# Patient Record
Sex: FEMALE | Race: WHITE | NOT HISPANIC OR LATINO | Employment: OTHER | URBAN - METROPOLITAN AREA
[De-identification: names, ages, dates, MRNs, and addresses within clinical notes are randomized per-mention and may not be internally consistent; named-entity substitution may affect disease eponyms.]

---

## 2018-06-13 ENCOUNTER — OFFICE VISIT CONVERTED (OUTPATIENT)
Dept: CARDIOLOGY | Facility: CLINIC | Age: 78
End: 2018-06-13
Attending: INTERNAL MEDICINE

## 2018-12-06 ENCOUNTER — OFFICE VISIT CONVERTED (OUTPATIENT)
Dept: CARDIOLOGY | Facility: CLINIC | Age: 78
End: 2018-12-06
Attending: INTERNAL MEDICINE

## 2018-12-06 ENCOUNTER — CONVERSION ENCOUNTER (OUTPATIENT)
Dept: CARDIOLOGY | Facility: CLINIC | Age: 78
End: 2018-12-06

## 2019-01-07 ENCOUNTER — OFFICE VISIT CONVERTED (OUTPATIENT)
Dept: PODIATRY | Facility: CLINIC | Age: 79
End: 2019-01-07
Attending: PODIATRIST

## 2019-01-07 ENCOUNTER — CONVERSION ENCOUNTER (OUTPATIENT)
Dept: PODIATRY | Facility: CLINIC | Age: 79
End: 2019-01-07

## 2019-02-12 ENCOUNTER — HOSPITAL ENCOUNTER (OUTPATIENT)
Dept: LAB | Facility: HOSPITAL | Age: 79
Discharge: HOME | End: 2019-02-12
Attending: INTERNAL MEDICINE

## 2019-02-12 LAB
25(OH)D3 SERPL-MCNC: 36.8 NG/ML (ref 30–100)
ALBUMIN SERPL-MCNC: 4.1 G/DL (ref 3.5–5)
ANION GAP SERPL CALC-SCNC: 18 MMOL/L (ref 8–19)
ANION GAP SERPL CALC-SCNC: 20 MMOL/L (ref 8–19)
APPEARANCE UR: CLEAR
BASOPHILS # BLD AUTO: 0.06 10*3/UL (ref 0–0.2)
BASOPHILS NFR BLD AUTO: 0.85 % (ref 0–3)
BILIRUB UR QL: NEGATIVE
BUN SERPL-MCNC: 47 MG/DL (ref 5–25)
BUN SERPL-MCNC: 47 MG/DL (ref 5–25)
BUN/CREAT SERPL: 20 {RATIO} (ref 6–20)
BUN/CREAT SERPL: 21 {RATIO} (ref 6–20)
CALCIUM SERPL-MCNC: 8.8 MG/DL (ref 8.7–10.4)
CALCIUM SERPL-MCNC: 8.8 MG/DL (ref 8.7–10.4)
CHLORIDE SERPL-SCNC: 102 MMOL/L (ref 99–111)
CHLORIDE SERPL-SCNC: 103 MMOL/L (ref 99–111)
COLOR UR: YELLOW
CONV BACTERIA: ABNORMAL
CONV CO2: 22 MMOL/L (ref 22–32)
CONV CO2: 22 MMOL/L (ref 22–32)
CONV COLLECTION SOURCE (UA): ABNORMAL
CONV CREATININE URINE, RANDOM: 99 MG/DL (ref 10–300)
CONV HYALINE CASTS IN URINE MICRO: ABNORMAL /[LPF]
CONV UROBILINOGEN IN URINE BY AUTOMATED TEST STRIP: 0.2 {EHRLICHU}/DL (ref 0.1–1)
CREAT UR-MCNC: 2.28 MG/DL (ref 0.5–0.9)
CREAT UR-MCNC: 2.36 MG/DL (ref 0.5–0.9)
EOSINOPHIL # BLD AUTO: 0.35 10*3/UL (ref 0–0.7)
EOSINOPHIL # BLD AUTO: 4.65 % (ref 0–7)
ERYTHROCYTE [DISTWIDTH] IN BLOOD BY AUTOMATED COUNT: 12.6 % (ref 11.5–14.5)
GFR SERPLBLD BASED ON 1.73 SQ M-ARVRAT: 19 ML/MIN/{1.73_M2}
GFR SERPLBLD BASED ON 1.73 SQ M-ARVRAT: 20 ML/MIN/{1.73_M2}
GLUCOSE SERPL-MCNC: 197 MG/DL (ref 65–99)
GLUCOSE SERPL-MCNC: 203 MG/DL (ref 65–99)
GLUCOSE UR QL: NEGATIVE MG/DL
HBA1C MFR BLD: 13.6 G/DL (ref 12–16)
HCT VFR BLD AUTO: 39.3 % (ref 37–47)
HGB UR QL STRIP: NEGATIVE
KETONES UR QL STRIP: NEGATIVE MG/DL
LEUKOCYTE ESTERASE UR QL STRIP: ABNORMAL
LYMPHOCYTES # BLD AUTO: 2.67 10*3/UL (ref 1–5)
MCH RBC QN AUTO: 31.8 PG (ref 27–31)
MCHC RBC AUTO-ENTMCNC: 34.6 G/DL (ref 33–37)
MCV RBC AUTO: 91.8 FL (ref 81–99)
MONOCYTES # BLD AUTO: 0.56 10*3/UL (ref 0.2–1.2)
MONOCYTES NFR BLD AUTO: 7.44 % (ref 3–10)
NEUTROPHILS # BLD AUTO: 3.91 10*3/UL (ref 2–8)
NEUTROPHILS NFR BLD AUTO: 51.7 % (ref 30–85)
NITRITE UR QL STRIP: POSITIVE
NRBC BLD AUTO-RTO: 0 % (ref 0–0.01)
OSMOLALITY SERPL CALC.SUM OF ELEC: 304 MOSM/KG (ref 273–304)
PH UR STRIP.AUTO: 5 [PH] (ref 5–8)
PHOSPHATE SERPL-MCNC: 4 MG/DL (ref 2.4–4.5)
PLATELET # BLD AUTO: 199 10*3/UL (ref 130–400)
PMV BLD AUTO: 7.5 FL (ref 7.4–10.4)
POTASSIUM SERPL-SCNC: 4.6 MMOL/L (ref 3.5–5.3)
POTASSIUM SERPL-SCNC: 4.6 MMOL/L (ref 3.5–5.3)
PROT UR QL: NEGATIVE MG/DL
PROT UR-MCNC: 10.5 MG/DL
PTH-INTACT SERPL-MCNC: 181.3 PG/ML (ref 11.1–79.5)
RBC # BLD AUTO: 4.28 10*6/UL (ref 4.2–5.4)
RBC #/AREA URNS HPF: ABNORMAL /[HPF]
SODIUM SERPL-SCNC: 138 MMOL/L (ref 135–147)
SODIUM SERPL-SCNC: 139 MMOL/L (ref 135–147)
SP GR UR: 1.02 (ref 1–1.03)
VARIANT LYMPHS NFR BLD MANUAL: 35.4 % (ref 20–45)
WBC # BLD AUTO: 7.56 10*3/UL (ref 4.8–10.8)
WBC #/AREA URNS HPF: ABNORMAL /[HPF]

## 2019-03-11 ENCOUNTER — HOSPITAL ENCOUNTER (OUTPATIENT)
Dept: OTHER | Facility: HOSPITAL | Age: 79
Discharge: HOME | End: 2019-03-11
Attending: INTERNAL MEDICINE

## 2019-03-11 LAB
ANION GAP SERPL CALC-SCNC: 16 MMOL/L (ref 8–19)
BUN SERPL-MCNC: 48 MG/DL (ref 5–25)
BUN/CREAT SERPL: 25 {RATIO} (ref 6–20)
CALCIUM SERPL-MCNC: 9.3 MG/DL (ref 8.7–10.4)
CHLORIDE SERPL-SCNC: 109 MMOL/L (ref 99–111)
CHOLEST SERPL-MCNC: 155 MG/DL (ref 107–200)
CHOLEST/HDLC SERPL: 4.6 {RATIO} (ref 3–6)
CONV CO2: 23 MMOL/L (ref 22–32)
CREAT UR-MCNC: 1.91 MG/DL (ref 0.5–0.9)
GFR SERPLBLD BASED ON 1.73 SQ M-ARVRAT: 25 ML/MIN/{1.73_M2}
GLUCOSE SERPL-MCNC: 118 MG/DL (ref 65–99)
HDLC SERPL-MCNC: 34 MG/DL (ref 40–60)
LDLC SERPL CALC-MCNC: 76 MG/DL (ref 70–100)
OSMOLALITY SERPL CALC.SUM OF ELEC: 310 MOSM/KG (ref 273–304)
POTASSIUM SERPL-SCNC: 4.7 MMOL/L (ref 3.5–5.3)
SODIUM SERPL-SCNC: 143 MMOL/L (ref 135–147)
TRIGL SERPL-MCNC: 225 MG/DL (ref 40–150)
VLDLC SERPL-MCNC: 45 MG/DL (ref 5–37)

## 2019-05-21 ENCOUNTER — HOSPITAL ENCOUNTER (OUTPATIENT)
Dept: LAB | Facility: HOSPITAL | Age: 79
Discharge: HOME | End: 2019-05-21
Attending: INTERNAL MEDICINE

## 2019-05-21 LAB
ALBUMIN SERPL-MCNC: 4 G/DL (ref 3.5–5)
ANION GAP SERPL CALC-SCNC: 20 MMOL/L (ref 8–19)
APPEARANCE UR: CLEAR
BASOPHILS # BLD AUTO: 0.09 10*3/UL (ref 0–0.2)
BASOPHILS NFR BLD AUTO: 1.1 % (ref 0–3)
BILIRUB UR QL: NEGATIVE
BUN SERPL-MCNC: 59 MG/DL (ref 5–25)
BUN/CREAT SERPL: 25 {RATIO} (ref 6–20)
CALCIUM SERPL-MCNC: 8.9 MG/DL (ref 8.7–10.4)
CHLORIDE SERPL-SCNC: 105 MMOL/L (ref 99–111)
COLOR UR: YELLOW
CONV ABS IMM GRAN: 0.03 10*3/UL (ref 0–0.2)
CONV BACTERIA: ABNORMAL
CONV CO2: 22 MMOL/L (ref 22–32)
CONV COLLECTION SOURCE (UA): ABNORMAL
CONV CREATININE URINE, RANDOM: 184.8 MG/DL (ref 10–300)
CONV HYALINE CASTS IN URINE MICRO: ABNORMAL /[LPF]
CONV IMMATURE GRAN: 0.4 % (ref 0–1.8)
CONV PROTEIN TO CREATININE RATIO (RANDOM URINE): 0.13 {RATIO} (ref 0–0.1)
CONV UROBILINOGEN IN URINE BY AUTOMATED TEST STRIP: 0.2 {EHRLICHU}/DL (ref 0.1–1)
CREAT UR-MCNC: 2.37 MG/DL (ref 0.5–0.9)
DEPRECATED RDW RBC AUTO: 47.8 FL (ref 36.4–46.3)
EOSINOPHIL # BLD AUTO: 0.5 10*3/UL (ref 0–0.7)
EOSINOPHIL # BLD AUTO: 5.9 % (ref 0–7)
ERYTHROCYTE [DISTWIDTH] IN BLOOD BY AUTOMATED COUNT: 13.4 % (ref 11.7–14.4)
GFR SERPLBLD BASED ON 1.73 SQ M-ARVRAT: 19 ML/MIN/{1.73_M2}
GLUCOSE SERPL-MCNC: 137 MG/DL (ref 65–99)
GLUCOSE UR QL: NEGATIVE MG/DL
HBA1C MFR BLD: 12.2 G/DL (ref 12–16)
HCT VFR BLD AUTO: 37.7 % (ref 37–47)
HGB UR QL STRIP: NEGATIVE
KETONES UR QL STRIP: NEGATIVE MG/DL
LEUKOCYTE ESTERASE UR QL STRIP: ABNORMAL
LYMPHOCYTES # BLD AUTO: 2.81 10*3/UL (ref 1–5)
MCH RBC QN AUTO: 31.1 PG (ref 27–31)
MCHC RBC AUTO-ENTMCNC: 32.4 G/DL (ref 33–37)
MCV RBC AUTO: 96.2 FL (ref 81–99)
MONOCYTES # BLD AUTO: 0.77 10*3/UL (ref 0.2–1.2)
MONOCYTES NFR BLD AUTO: 9.1 % (ref 3–10)
NEUTROPHILS # BLD AUTO: 4.3 10*3/UL (ref 2–8)
NEUTROPHILS NFR BLD AUTO: 50.4 % (ref 30–85)
NITRITE UR QL STRIP: NEGATIVE
NRBC CBCN: 0 % (ref 0–0.7)
PH UR STRIP.AUTO: 5 [PH] (ref 5–8)
PHOSPHATE SERPL-MCNC: 4.4 MG/DL (ref 2.4–4.5)
PLATELET # BLD AUTO: 198 10*3/UL (ref 130–400)
PMV BLD AUTO: 10.6 FL (ref 9.4–12.3)
POTASSIUM SERPL-SCNC: 4.9 MMOL/L (ref 3.5–5.3)
PROT UR QL: NEGATIVE MG/DL
PROT UR-MCNC: 24.5 MG/DL
PTH-INTACT SERPL-MCNC: 163.5 PG/ML (ref 11.1–79.5)
RBC # BLD AUTO: 3.92 10*6/UL (ref 4.2–5.4)
RBC #/AREA URNS HPF: ABNORMAL /[HPF]
SODIUM SERPL-SCNC: 142 MMOL/L (ref 135–147)
SP GR UR: 1.02 (ref 1–1.03)
VARIANT LYMPHS NFR BLD MANUAL: 33.1 % (ref 20–45)
WBC # BLD AUTO: 8.5 10*3/UL (ref 4.8–10.8)
WBC #/AREA URNS HPF: ABNORMAL /[HPF]

## 2019-06-07 ENCOUNTER — HOSPITAL ENCOUNTER (OUTPATIENT)
Dept: OTHER | Facility: HOSPITAL | Age: 79
Discharge: HOME | End: 2019-06-07
Attending: INTERNAL MEDICINE

## 2019-06-07 LAB
ALBUMIN SERPL-MCNC: 3.9 G/DL (ref 3.5–5)
ALBUMIN/GLOB SERPL: 1.1 {RATIO} (ref 1.4–2.6)
ALP SERPL-CCNC: 53 U/L (ref 43–160)
ALT SERPL-CCNC: 13 U/L (ref 10–40)
ANION GAP SERPL CALC-SCNC: 19 MMOL/L (ref 8–19)
AST SERPL-CCNC: 16 U/L (ref 15–50)
BILIRUB SERPL-MCNC: 0.32 MG/DL (ref 0.2–1.3)
BUN SERPL-MCNC: 51 MG/DL (ref 5–25)
BUN/CREAT SERPL: 22 {RATIO} (ref 6–20)
CALCIUM SERPL-MCNC: 8.8 MG/DL (ref 8.7–10.4)
CHLORIDE SERPL-SCNC: 107 MMOL/L (ref 99–111)
CHOLEST SERPL-MCNC: 134 MG/DL (ref 107–200)
CHOLEST/HDLC SERPL: 4.1 {RATIO} (ref 3–6)
CONV CO2: 23 MMOL/L (ref 22–32)
CONV TOTAL PROTEIN: 7.3 G/DL (ref 6.3–8.2)
CREAT UR-MCNC: 2.28 MG/DL (ref 0.5–0.9)
GFR SERPLBLD BASED ON 1.73 SQ M-ARVRAT: 20 ML/MIN/{1.73_M2}
GLOBULIN UR ELPH-MCNC: 3.4 G/DL (ref 2–3.5)
GLUCOSE SERPL-MCNC: 111 MG/DL (ref 65–99)
HDLC SERPL-MCNC: 33 MG/DL (ref 40–60)
LDLC SERPL CALC-MCNC: 51 MG/DL (ref 70–100)
OSMOLALITY SERPL CALC.SUM OF ELEC: 312 MOSM/KG (ref 273–304)
POTASSIUM SERPL-SCNC: 4.9 MMOL/L (ref 3.5–5.3)
SODIUM SERPL-SCNC: 144 MMOL/L (ref 135–147)
TRIGL SERPL-MCNC: 250 MG/DL (ref 40–150)
VLDLC SERPL-MCNC: 50 MG/DL (ref 5–37)

## 2019-06-12 ENCOUNTER — OFFICE VISIT CONVERTED (OUTPATIENT)
Dept: CARDIOLOGY | Facility: CLINIC | Age: 79
End: 2019-06-12
Attending: INTERNAL MEDICINE

## 2019-06-12 ENCOUNTER — CONVERSION ENCOUNTER (OUTPATIENT)
Dept: CARDIOLOGY | Facility: CLINIC | Age: 79
End: 2019-06-12

## 2019-08-09 ENCOUNTER — HOSPITAL ENCOUNTER (OUTPATIENT)
Dept: LAB | Facility: HOSPITAL | Age: 79
Discharge: HOME | End: 2019-08-09
Attending: INTERNAL MEDICINE

## 2019-08-09 LAB
ALBUMIN SERPL-MCNC: 3.9 G/DL (ref 3.5–5)
ANION GAP SERPL CALC-SCNC: 18 MMOL/L (ref 8–19)
APPEARANCE UR: ABNORMAL
BASOPHILS # BLD AUTO: 0.08 10*3/UL (ref 0–0.2)
BASOPHILS NFR BLD AUTO: 1 % (ref 0–3)
BILIRUB UR QL: NEGATIVE
BUN SERPL-MCNC: 71 MG/DL (ref 5–25)
BUN/CREAT SERPL: 34 {RATIO} (ref 6–20)
CALCIUM SERPL-MCNC: 9.3 MG/DL (ref 8.7–10.4)
CHLORIDE SERPL-SCNC: 106 MMOL/L (ref 99–111)
COLOR UR: YELLOW
CONV ABS IMM GRAN: 0.03 10*3/UL (ref 0–0.2)
CONV BACTERIA: ABNORMAL
CONV CO2: 21 MMOL/L (ref 22–32)
CONV COLLECTION SOURCE (UA): ABNORMAL
CONV CREATININE URINE, RANDOM: 131.1 MG/DL (ref 10–300)
CONV HYALINE CASTS IN URINE MICRO: ABNORMAL /[LPF]
CONV IMMATURE GRAN: 0.4 % (ref 0–1.8)
CONV PROTEIN TO CREATININE RATIO (RANDOM URINE): 0.13 {RATIO} (ref 0–0.1)
CONV UROBILINOGEN IN URINE BY AUTOMATED TEST STRIP: 1 {EHRLICHU}/DL (ref 0.1–1)
CREAT UR-MCNC: 2.08 MG/DL (ref 0.5–0.9)
DEPRECATED RDW RBC AUTO: 44.1 FL (ref 36.4–46.3)
EOSINOPHIL # BLD AUTO: 0.51 10*3/UL (ref 0–0.7)
EOSINOPHIL # BLD AUTO: 6.3 % (ref 0–7)
ERYTHROCYTE [DISTWIDTH] IN BLOOD BY AUTOMATED COUNT: 12.5 % (ref 11.7–14.4)
GFR SERPLBLD BASED ON 1.73 SQ M-ARVRAT: 22 ML/MIN/{1.73_M2}
GLUCOSE SERPL-MCNC: 150 MG/DL (ref 65–99)
GLUCOSE UR QL: NEGATIVE MG/DL
HCT VFR BLD AUTO: 37.6 % (ref 37–47)
HGB BLD-MCNC: 12.3 G/DL (ref 12–16)
HGB UR QL STRIP: NEGATIVE
KETONES UR QL STRIP: NEGATIVE MG/DL
LEUKOCYTE ESTERASE UR QL STRIP: ABNORMAL
LYMPHOCYTES # BLD AUTO: 2.88 10*3/UL (ref 1–5)
LYMPHOCYTES NFR BLD AUTO: 35.5 % (ref 20–45)
MCH RBC QN AUTO: 31.3 PG (ref 27–31)
MCHC RBC AUTO-ENTMCNC: 32.7 G/DL (ref 33–37)
MCV RBC AUTO: 95.7 FL (ref 81–99)
MONOCYTES # BLD AUTO: 0.68 10*3/UL (ref 0.2–1.2)
MONOCYTES NFR BLD AUTO: 8.4 % (ref 3–10)
NEUTROPHILS # BLD AUTO: 3.93 10*3/UL (ref 2–8)
NEUTROPHILS NFR BLD AUTO: 48.4 % (ref 30–85)
NITRITE UR QL STRIP: NEGATIVE
NRBC CBCN: 0 % (ref 0–0.7)
PH UR STRIP.AUTO: 5 [PH] (ref 5–8)
PHOSPHATE SERPL-MCNC: 4.5 MG/DL (ref 2.4–4.5)
PLATELET # BLD AUTO: 186 10*3/UL (ref 130–400)
PMV BLD AUTO: 10.5 FL (ref 9.4–12.3)
POTASSIUM SERPL-SCNC: 4.9 MMOL/L (ref 3.5–5.3)
PROT UR QL: NEGATIVE MG/DL
PROT UR-MCNC: 16.6 MG/DL
RBC # BLD AUTO: 3.93 10*6/UL (ref 4.2–5.4)
RBC #/AREA URNS HPF: ABNORMAL /[HPF]
SODIUM SERPL-SCNC: 140 MMOL/L (ref 135–147)
SP GR UR: 1.02 (ref 1–1.03)
WBC # BLD AUTO: 8.11 10*3/UL (ref 4.8–10.8)
WBC #/AREA URNS HPF: ABNORMAL /[HPF]

## 2019-09-16 ENCOUNTER — HOSPITAL ENCOUNTER (OUTPATIENT)
Dept: GENERAL RADIOLOGY | Facility: HOSPITAL | Age: 79
Discharge: HOME | End: 2019-09-16
Attending: FAMILY MEDICINE

## 2019-11-15 ENCOUNTER — HOSPITAL ENCOUNTER (OUTPATIENT)
Dept: LAB | Facility: HOSPITAL | Age: 79
Discharge: HOME | End: 2019-11-15
Attending: INTERNAL MEDICINE

## 2019-11-15 ENCOUNTER — HOSPITAL ENCOUNTER (OUTPATIENT)
Dept: MAMMOGRAPHY | Facility: HOSPITAL | Age: 79
Discharge: HOME | End: 2019-11-15
Attending: NURSE PRACTITIONER

## 2019-11-15 LAB
25(OH)D3 SERPL-MCNC: 63.6 NG/ML (ref 30–100)
ALBUMIN SERPL-MCNC: 4.3 G/DL (ref 3.5–5)
ANION GAP SERPL CALC-SCNC: 19 MMOL/L (ref 8–19)
APPEARANCE UR: ABNORMAL
BASOPHILS # BLD AUTO: 0.09 10*3/UL (ref 0–0.2)
BASOPHILS NFR BLD AUTO: 0.9 % (ref 0–3)
BILIRUB UR QL: NEGATIVE
BUN SERPL-MCNC: 57 MG/DL (ref 5–25)
BUN/CREAT SERPL: 23 {RATIO} (ref 6–20)
CALCIUM SERPL-MCNC: 9.2 MG/DL (ref 8.7–10.4)
CHLORIDE SERPL-SCNC: 101 MMOL/L (ref 99–111)
COLOR UR: YELLOW
CONV ABS IMM GRAN: 0.04 10*3/UL (ref 0–0.2)
CONV BACTERIA: ABNORMAL
CONV CO2: 24 MMOL/L (ref 22–32)
CONV COLLECTION SOURCE (UA): ABNORMAL
CONV CREATININE URINE, RANDOM: 173.2 MG/DL (ref 10–300)
CONV HYALINE CASTS IN URINE MICRO: ABNORMAL /[LPF]
CONV IMMATURE GRAN: 0.4 % (ref 0–1.8)
CONV PROTEIN TO CREATININE RATIO (RANDOM URINE): 0.08 {RATIO} (ref 0–0.1)
CONV UROBILINOGEN IN URINE BY AUTOMATED TEST STRIP: 0.2 {EHRLICHU}/DL (ref 0.1–1)
CREAT UR-MCNC: 2.51 MG/DL (ref 0.5–0.9)
DEPRECATED RDW RBC AUTO: 50.4 FL (ref 36.4–46.3)
EOSINOPHIL # BLD AUTO: 0.62 10*3/UL (ref 0–0.7)
EOSINOPHIL # BLD AUTO: 6.3 % (ref 0–7)
ERYTHROCYTE [DISTWIDTH] IN BLOOD BY AUTOMATED COUNT: 14.1 % (ref 11.7–14.4)
GFR SERPLBLD BASED ON 1.73 SQ M-ARVRAT: 18 ML/MIN/{1.73_M2}
GLUCOSE SERPL-MCNC: 155 MG/DL (ref 65–99)
GLUCOSE UR QL: NEGATIVE MG/DL
HCT VFR BLD AUTO: 38.8 % (ref 37–47)
HGB BLD-MCNC: 11.9 G/DL (ref 12–16)
HGB UR QL STRIP: NEGATIVE
KETONES UR QL STRIP: NEGATIVE MG/DL
LEUKOCYTE ESTERASE UR QL STRIP: ABNORMAL
LYMPHOCYTES # BLD AUTO: 3.19 10*3/UL (ref 1–5)
LYMPHOCYTES NFR BLD AUTO: 32.7 % (ref 20–45)
MCH RBC QN AUTO: 29.9 PG (ref 27–31)
MCHC RBC AUTO-ENTMCNC: 30.7 G/DL (ref 33–37)
MCV RBC AUTO: 97.5 FL (ref 81–99)
MONOCYTES # BLD AUTO: 0.86 10*3/UL (ref 0.2–1.2)
MONOCYTES NFR BLD AUTO: 8.8 % (ref 3–10)
NEUTROPHILS # BLD AUTO: 4.97 10*3/UL (ref 2–8)
NEUTROPHILS NFR BLD AUTO: 50.9 % (ref 30–85)
NITRITE UR QL STRIP: NEGATIVE
NRBC CBCN: 0 % (ref 0–0.7)
PH UR STRIP.AUTO: 5 [PH] (ref 5–8)
PHOSPHATE SERPL-MCNC: 4.4 MG/DL (ref 2.4–4.5)
PLATELET # BLD AUTO: 212 10*3/UL (ref 130–400)
PMV BLD AUTO: 10.5 FL (ref 9.4–12.3)
POTASSIUM SERPL-SCNC: 4.6 MMOL/L (ref 3.5–5.3)
PROT UR QL: NEGATIVE MG/DL
PROT UR-MCNC: 13.5 MG/DL
PTH-INTACT SERPL-MCNC: 92.1 PG/ML (ref 11.1–79.5)
RBC # BLD AUTO: 3.98 10*6/UL (ref 4.2–5.4)
RBC #/AREA URNS HPF: ABNORMAL /[HPF]
SODIUM SERPL-SCNC: 139 MMOL/L (ref 135–147)
SP GR UR: 1.02 (ref 1–1.03)
WBC # BLD AUTO: 9.77 10*3/UL (ref 4.8–10.8)
WBC #/AREA URNS HPF: ABNORMAL /[HPF]

## 2019-12-02 ENCOUNTER — HOSPITAL ENCOUNTER (OUTPATIENT)
Dept: LAB | Facility: HOSPITAL | Age: 79
Discharge: HOME | End: 2019-12-02
Attending: NURSE PRACTITIONER

## 2019-12-02 LAB
ALBUMIN SERPL-MCNC: 4.2 G/DL (ref 3.5–5)
ALBUMIN/GLOB SERPL: 1.2 {RATIO} (ref 1.4–2.6)
ALP SERPL-CCNC: 49 U/L (ref 43–160)
ALT SERPL-CCNC: 12 U/L (ref 10–40)
ANION GAP SERPL CALC-SCNC: 19 MMOL/L (ref 8–19)
AST SERPL-CCNC: 16 U/L (ref 15–50)
BASOPHILS # BLD AUTO: 0.09 10*3/UL (ref 0–0.2)
BASOPHILS NFR BLD AUTO: 1 % (ref 0–3)
BILIRUB SERPL-MCNC: 0.39 MG/DL (ref 0.2–1.3)
BUN SERPL-MCNC: 50 MG/DL (ref 5–25)
BUN/CREAT SERPL: 24 {RATIO} (ref 6–20)
CALCIUM SERPL-MCNC: 9.4 MG/DL (ref 8.7–10.4)
CHLORIDE SERPL-SCNC: 102 MMOL/L (ref 99–111)
CHOLEST SERPL-MCNC: 153 MG/DL (ref 107–200)
CHOLEST/HDLC SERPL: 4.6 {RATIO} (ref 3–6)
CONV ABS IMM GRAN: 0.03 10*3/UL (ref 0–0.2)
CONV CO2: 23 MMOL/L (ref 22–32)
CONV IMMATURE GRAN: 0.3 % (ref 0–1.8)
CONV TOTAL PROTEIN: 7.7 G/DL (ref 6.3–8.2)
CREAT UR-MCNC: 2.12 MG/DL (ref 0.5–0.9)
DEPRECATED RDW RBC AUTO: 46.3 FL (ref 36.4–46.3)
EOSINOPHIL # BLD AUTO: 0.56 10*3/UL (ref 0–0.7)
EOSINOPHIL # BLD AUTO: 6 % (ref 0–7)
ERYTHROCYTE [DISTWIDTH] IN BLOOD BY AUTOMATED COUNT: 13.4 % (ref 11.7–14.4)
GFR SERPLBLD BASED ON 1.73 SQ M-ARVRAT: 22 ML/MIN/{1.73_M2}
GLOBULIN UR ELPH-MCNC: 3.5 G/DL (ref 2–3.5)
GLUCOSE SERPL-MCNC: 160 MG/DL (ref 65–99)
HCT VFR BLD AUTO: 38.8 % (ref 37–47)
HDLC SERPL-MCNC: 33 MG/DL (ref 40–60)
HGB BLD-MCNC: 12.4 G/DL (ref 12–16)
LDLC SERPL CALC-MCNC: 73 MG/DL (ref 70–100)
LYMPHOCYTES # BLD AUTO: 2.94 10*3/UL (ref 1–5)
LYMPHOCYTES NFR BLD AUTO: 31.7 % (ref 20–45)
MCH RBC QN AUTO: 30.2 PG (ref 27–31)
MCHC RBC AUTO-ENTMCNC: 32 G/DL (ref 33–37)
MCV RBC AUTO: 94.6 FL (ref 81–99)
MONOCYTES # BLD AUTO: 0.71 10*3/UL (ref 0.2–1.2)
MONOCYTES NFR BLD AUTO: 7.7 % (ref 3–10)
NEUTROPHILS # BLD AUTO: 4.95 10*3/UL (ref 2–8)
NEUTROPHILS NFR BLD AUTO: 53.3 % (ref 30–85)
NRBC CBCN: 0 % (ref 0–0.7)
OSMOLALITY SERPL CALC.SUM OF ELEC: 305 MOSM/KG (ref 273–304)
PLATELET # BLD AUTO: 220 10*3/UL (ref 130–400)
PMV BLD AUTO: 10.5 FL (ref 9.4–12.3)
POTASSIUM SERPL-SCNC: 4.7 MMOL/L (ref 3.5–5.3)
RBC # BLD AUTO: 4.1 10*6/UL (ref 4.2–5.4)
SODIUM SERPL-SCNC: 139 MMOL/L (ref 135–147)
TRIGL SERPL-MCNC: 234 MG/DL (ref 40–150)
VLDLC SERPL-MCNC: 47 MG/DL (ref 5–37)
WBC # BLD AUTO: 9.28 10*3/UL (ref 4.8–10.8)

## 2019-12-18 ENCOUNTER — CONVERSION ENCOUNTER (OUTPATIENT)
Dept: CARDIOLOGY | Facility: CLINIC | Age: 79
End: 2019-12-18
Attending: INTERNAL MEDICINE

## 2019-12-18 ENCOUNTER — OFFICE VISIT CONVERTED (OUTPATIENT)
Dept: CARDIOLOGY | Facility: CLINIC | Age: 79
End: 2019-12-18
Attending: INTERNAL MEDICINE

## 2020-04-23 ENCOUNTER — HOSPITAL ENCOUNTER (OUTPATIENT)
Dept: LAB | Facility: HOSPITAL | Age: 80
Discharge: HOME | End: 2020-04-23
Attending: NURSE PRACTITIONER

## 2020-04-23 LAB
25(OH)D3 SERPL-MCNC: 52.1 NG/ML (ref 30–100)
ALBUMIN SERPL-MCNC: 4 G/DL (ref 3.5–5)
ANION GAP SERPL CALC-SCNC: 18 MMOL/L (ref 8–19)
APPEARANCE UR: ABNORMAL
BASOPHILS # BLD AUTO: 0.08 10*3/UL (ref 0–0.2)
BASOPHILS NFR BLD AUTO: 0.9 % (ref 0–3)
BILIRUB UR QL: NEGATIVE
BUN SERPL-MCNC: 57 MG/DL (ref 5–25)
BUN/CREAT SERPL: 22 {RATIO} (ref 6–20)
CALCIUM SERPL-MCNC: 8.6 MG/DL (ref 8.7–10.4)
CHLORIDE SERPL-SCNC: 102 MMOL/L (ref 99–111)
COLOR UR: YELLOW
CONV ABS IMM GRAN: 0.05 10*3/UL (ref 0–0.2)
CONV BACTERIA: NEGATIVE
CONV CO2: 22 MMOL/L (ref 22–32)
CONV COLLECTION SOURCE (UA): ABNORMAL
CONV CREATININE URINE, RANDOM: 157.2 MG/DL (ref 10–300)
CONV HYALINE CASTS IN URINE MICRO: ABNORMAL /[LPF]
CONV IMMATURE GRAN: 0.5 % (ref 0–1.8)
CONV PROTEIN TO CREATININE RATIO (RANDOM URINE): 0.12 {RATIO} (ref 0–0.1)
CONV UROBILINOGEN IN URINE BY AUTOMATED TEST STRIP: 0.2 {EHRLICHU}/DL (ref 0.1–1)
CREAT UR-MCNC: 2.54 MG/DL (ref 0.5–0.9)
DEPRECATED RDW RBC AUTO: 44.3 FL (ref 36.4–46.3)
EOSINOPHIL # BLD AUTO: 0.41 10*3/UL (ref 0–0.7)
EOSINOPHIL # BLD AUTO: 4.5 % (ref 0–7)
ERYTHROCYTE [DISTWIDTH] IN BLOOD BY AUTOMATED COUNT: 12.7 % (ref 11.7–14.4)
GFR SERPLBLD BASED ON 1.73 SQ M-ARVRAT: 17 ML/MIN/{1.73_M2}
GLUCOSE SERPL-MCNC: 128 MG/DL (ref 65–99)
GLUCOSE UR QL: NEGATIVE MG/DL
HCT VFR BLD AUTO: 36.4 % (ref 37–47)
HGB BLD-MCNC: 11.7 G/DL (ref 12–16)
HGB UR QL STRIP: NEGATIVE
KETONES UR QL STRIP: NEGATIVE MG/DL
LEUKOCYTE ESTERASE UR QL STRIP: ABNORMAL
LYMPHOCYTES # BLD AUTO: 3.46 10*3/UL (ref 1–5)
LYMPHOCYTES NFR BLD AUTO: 37.9 % (ref 20–45)
MCH RBC QN AUTO: 30.7 PG (ref 27–31)
MCHC RBC AUTO-ENTMCNC: 32.1 G/DL (ref 33–37)
MCV RBC AUTO: 95.5 FL (ref 81–99)
MONOCYTES # BLD AUTO: 0.77 10*3/UL (ref 0.2–1.2)
MONOCYTES NFR BLD AUTO: 8.4 % (ref 3–10)
NEUTROPHILS # BLD AUTO: 4.35 10*3/UL (ref 2–8)
NEUTROPHILS NFR BLD AUTO: 47.8 % (ref 30–85)
NITRITE UR QL STRIP: NEGATIVE
NRBC CBCN: 0 % (ref 0–0.7)
PH UR STRIP.AUTO: 5 [PH] (ref 5–8)
PHOSPHATE SERPL-MCNC: 4.3 MG/DL (ref 2.4–4.5)
PLATELET # BLD AUTO: 204 10*3/UL (ref 130–400)
PMV BLD AUTO: 10.6 FL (ref 9.4–12.3)
POTASSIUM SERPL-SCNC: 4.4 MMOL/L (ref 3.5–5.3)
PROT UR QL: ABNORMAL MG/DL
PROT UR-MCNC: 18.5 MG/DL
PTH-INTACT SERPL-MCNC: 110.5 PG/ML (ref 11.1–79.5)
RBC # BLD AUTO: 3.81 10*6/UL (ref 4.2–5.4)
RBC #/AREA URNS HPF: ABNORMAL /[HPF]
SODIUM SERPL-SCNC: 138 MMOL/L (ref 135–147)
SP GR UR: 1.02 (ref 1–1.03)
WBC # BLD AUTO: 9.12 10*3/UL (ref 4.8–10.8)
WBC #/AREA URNS HPF: ABNORMAL /[HPF]

## 2020-06-08 ENCOUNTER — HOSPITAL ENCOUNTER (OUTPATIENT)
Dept: CARDIOLOGY | Facility: HOSPITAL | Age: 80
Discharge: HOME | End: 2020-06-08
Attending: INTERNAL MEDICINE

## 2020-06-10 ENCOUNTER — HOSPITAL ENCOUNTER (OUTPATIENT)
Dept: CARDIOLOGY | Facility: HOSPITAL | Age: 80
Discharge: HOME | End: 2020-06-10

## 2020-07-24 ENCOUNTER — HOSPITAL ENCOUNTER (OUTPATIENT)
Dept: LAB | Facility: HOSPITAL | Age: 80
Discharge: HOME | End: 2020-07-24
Attending: INTERNAL MEDICINE

## 2020-07-24 LAB
ALBUMIN SERPL-MCNC: 3.9 G/DL (ref 3.5–5)
ANION GAP SERPL CALC-SCNC: 19 MMOL/L (ref 8–19)
APPEARANCE UR: CLEAR
BASOPHILS # BLD AUTO: 0.08 10*3/UL (ref 0–0.2)
BASOPHILS NFR BLD AUTO: 0.9 % (ref 0–3)
BILIRUB UR QL: NEGATIVE
BUN SERPL-MCNC: 43 MG/DL (ref 5–25)
BUN/CREAT SERPL: 18 {RATIO} (ref 6–20)
CALCIUM SERPL-MCNC: 8.5 MG/DL (ref 8.7–10.4)
CHLORIDE SERPL-SCNC: 102 MMOL/L (ref 99–111)
COLOR UR: YELLOW
CONV ABS IMM GRAN: 0.04 10*3/UL (ref 0–0.2)
CONV BACTERIA: NEGATIVE
CONV CO2: 22 MMOL/L (ref 22–32)
CONV COLLECTION SOURCE (UA): ABNORMAL
CONV CREATININE URINE, RANDOM: 123.2 MG/DL (ref 10–300)
CONV HYALINE CASTS IN URINE MICRO: ABNORMAL /[LPF]
CONV IMMATURE GRAN: 0.5 % (ref 0–1.8)
CONV PROTEIN TO CREATININE RATIO (RANDOM URINE): 0.08 {RATIO} (ref 0–0.1)
CONV UROBILINOGEN IN URINE BY AUTOMATED TEST STRIP: 0.2 {EHRLICHU}/DL (ref 0.1–1)
CREAT UR-MCNC: 2.35 MG/DL (ref 0.5–0.9)
DEPRECATED RDW RBC AUTO: 44.7 FL (ref 36.4–46.3)
EOSINOPHIL # BLD AUTO: 0.46 10*3/UL (ref 0–0.7)
EOSINOPHIL # BLD AUTO: 5.4 % (ref 0–7)
ERYTHROCYTE [DISTWIDTH] IN BLOOD BY AUTOMATED COUNT: 12.7 % (ref 11.7–14.4)
GFR SERPLBLD BASED ON 1.73 SQ M-ARVRAT: 19 ML/MIN/{1.73_M2}
GLUCOSE SERPL-MCNC: 109 MG/DL (ref 65–99)
GLUCOSE UR QL: NEGATIVE MG/DL
HCT VFR BLD AUTO: 36.5 % (ref 37–47)
HGB BLD-MCNC: 11.7 G/DL (ref 12–16)
HGB UR QL STRIP: NEGATIVE
KETONES UR QL STRIP: NEGATIVE MG/DL
LEUKOCYTE ESTERASE UR QL STRIP: ABNORMAL
LYMPHOCYTES # BLD AUTO: 2.91 10*3/UL (ref 1–5)
LYMPHOCYTES NFR BLD AUTO: 34 % (ref 20–45)
MCH RBC QN AUTO: 30.8 PG (ref 27–31)
MCHC RBC AUTO-ENTMCNC: 32.1 G/DL (ref 33–37)
MCV RBC AUTO: 96.1 FL (ref 81–99)
MONOCYTES # BLD AUTO: 0.8 10*3/UL (ref 0.2–1.2)
MONOCYTES NFR BLD AUTO: 9.4 % (ref 3–10)
NEUTROPHILS # BLD AUTO: 4.26 10*3/UL (ref 2–8)
NEUTROPHILS NFR BLD AUTO: 49.8 % (ref 30–85)
NITRITE UR QL STRIP: NEGATIVE
NRBC CBCN: 0 % (ref 0–0.7)
PH UR STRIP.AUTO: 5 [PH] (ref 5–8)
PHOSPHATE SERPL-MCNC: 3.8 MG/DL (ref 2.4–4.5)
PLATELET # BLD AUTO: 212 10*3/UL (ref 130–400)
PMV BLD AUTO: 10.2 FL (ref 9.4–12.3)
POTASSIUM SERPL-SCNC: 5.6 MMOL/L (ref 3.5–5.3)
PROT UR QL: NEGATIVE MG/DL
PROT UR-MCNC: 10.3 MG/DL
RBC # BLD AUTO: 3.8 10*6/UL (ref 4.2–5.4)
RBC #/AREA URNS HPF: ABNORMAL /[HPF]
SODIUM SERPL-SCNC: 137 MMOL/L (ref 135–147)
SP GR UR: 1.02 (ref 1–1.03)
WBC # BLD AUTO: 8.55 10*3/UL (ref 4.8–10.8)
WBC #/AREA URNS HPF: ABNORMAL /[HPF]

## 2020-08-13 ENCOUNTER — HOSPITAL ENCOUNTER (OUTPATIENT)
Dept: LAB | Facility: HOSPITAL | Age: 80
Discharge: HOME | End: 2020-08-13
Attending: INTERNAL MEDICINE

## 2020-08-13 LAB
ANION GAP SERPL CALC-SCNC: 21 MMOL/L (ref 8–19)
BUN SERPL-MCNC: 59 MG/DL (ref 5–25)
BUN/CREAT SERPL: 23 {RATIO} (ref 6–20)
CALCIUM SERPL-MCNC: 10 MG/DL (ref 8.7–10.4)
CHLORIDE SERPL-SCNC: 98 MMOL/L (ref 99–111)
CONV CO2: 22 MMOL/L (ref 22–32)
CREAT UR-MCNC: 2.58 MG/DL (ref 0.5–0.9)
GFR SERPLBLD BASED ON 1.73 SQ M-ARVRAT: 17 ML/MIN/{1.73_M2}
GLUCOSE SERPL-MCNC: 165 MG/DL (ref 65–99)
OSMOLALITY SERPL CALC.SUM OF ELEC: 302 MOSM/KG (ref 273–304)
POTASSIUM SERPL-SCNC: 4.8 MMOL/L (ref 3.5–5.3)
SODIUM SERPL-SCNC: 136 MMOL/L (ref 135–147)

## 2020-09-29 ENCOUNTER — HOSPITAL ENCOUNTER (OUTPATIENT)
Dept: GENERAL RADIOLOGY | Facility: HOSPITAL | Age: 80
Discharge: HOME | End: 2020-09-29
Attending: FAMILY MEDICINE

## 2020-09-29 ENCOUNTER — HOSPITAL ENCOUNTER (OUTPATIENT)
Dept: LAB | Facility: HOSPITAL | Age: 80
Discharge: HOME | End: 2020-09-29
Attending: INTERNAL MEDICINE

## 2020-09-29 LAB
ALBUMIN SERPL-MCNC: 3.9 G/DL (ref 3.5–5)
ALBUMIN/GLOB SERPL: 1.2 {RATIO} (ref 1.4–2.6)
ALP SERPL-CCNC: 55 U/L (ref 43–160)
ALT SERPL-CCNC: 8 U/L (ref 10–40)
ANION GAP SERPL CALC-SCNC: 19 MMOL/L (ref 8–19)
AST SERPL-CCNC: 13 U/L (ref 15–50)
BILIRUB SERPL-MCNC: 0.25 MG/DL (ref 0.2–1.3)
BUN SERPL-MCNC: 55 MG/DL (ref 5–25)
BUN/CREAT SERPL: 21 {RATIO} (ref 6–20)
CALCIUM SERPL-MCNC: 8.7 MG/DL (ref 8.7–10.4)
CHLORIDE SERPL-SCNC: 104 MMOL/L (ref 99–111)
CHOLEST SERPL-MCNC: 157 MG/DL (ref 107–200)
CHOLEST/HDLC SERPL: 4.1 {RATIO} (ref 3–6)
CONV CO2: 19 MMOL/L (ref 22–32)
CONV TOTAL PROTEIN: 7.2 G/DL (ref 6.3–8.2)
CREAT UR-MCNC: 2.63 MG/DL (ref 0.5–0.9)
GFR SERPLBLD BASED ON 1.73 SQ M-ARVRAT: 17 ML/MIN/{1.73_M2}
GLOBULIN UR ELPH-MCNC: 3.3 G/DL (ref 2–3.5)
GLUCOSE SERPL-MCNC: 121 MG/DL (ref 65–99)
HDLC SERPL-MCNC: 38 MG/DL (ref 40–60)
LDLC SERPL CALC-MCNC: 84 MG/DL (ref 70–100)
OSMOLALITY SERPL CALC.SUM OF ELEC: 302 MOSM/KG (ref 273–304)
POTASSIUM SERPL-SCNC: 4.3 MMOL/L (ref 3.5–5.3)
SODIUM SERPL-SCNC: 138 MMOL/L (ref 135–147)
TRIGL SERPL-MCNC: 175 MG/DL (ref 40–150)
VLDLC SERPL-MCNC: 35 MG/DL (ref 5–37)

## 2020-10-21 ENCOUNTER — HOSPITAL ENCOUNTER (OUTPATIENT)
Dept: LAB | Facility: HOSPITAL | Age: 80
Discharge: HOME | End: 2020-10-21
Attending: INTERNAL MEDICINE

## 2020-10-21 LAB
ALBUMIN SERPL-MCNC: 4 G/DL (ref 3.5–5)
ANION GAP SERPL CALC-SCNC: 18 MMOL/L (ref 8–19)
BASOPHILS # BLD AUTO: 0.11 10*3/UL (ref 0–0.2)
BASOPHILS NFR BLD AUTO: 1.1 % (ref 0–3)
BUN SERPL-MCNC: 57 MG/DL (ref 5–25)
BUN/CREAT SERPL: 23 {RATIO} (ref 6–20)
CALCIUM SERPL-MCNC: 9.3 MG/DL (ref 8.7–10.4)
CHLORIDE SERPL-SCNC: 102 MMOL/L (ref 99–111)
CONV ABS IMM GRAN: 0.05 10*3/UL (ref 0–0.2)
CONV CO2: 23 MMOL/L (ref 22–32)
CONV IMMATURE GRAN: 0.5 % (ref 0–1.8)
CREAT UR-MCNC: 2.51 MG/DL (ref 0.5–0.9)
DEPRECATED RDW RBC AUTO: 46.4 FL (ref 36.4–46.3)
EOSINOPHIL # BLD AUTO: 0.85 10*3/UL (ref 0–0.7)
EOSINOPHIL # BLD AUTO: 8.5 % (ref 0–7)
ERYTHROCYTE [DISTWIDTH] IN BLOOD BY AUTOMATED COUNT: 13.2 % (ref 11.7–14.4)
GFR SERPLBLD BASED ON 1.73 SQ M-ARVRAT: 18 ML/MIN/{1.73_M2}
GLUCOSE SERPL-MCNC: 168 MG/DL (ref 65–99)
HCT VFR BLD AUTO: 36.8 % (ref 37–47)
HGB BLD-MCNC: 11.7 G/DL (ref 12–16)
LYMPHOCYTES # BLD AUTO: 3.25 10*3/UL (ref 1–5)
LYMPHOCYTES NFR BLD AUTO: 32.7 % (ref 20–45)
MCH RBC QN AUTO: 30.3 PG (ref 27–31)
MCHC RBC AUTO-ENTMCNC: 31.8 G/DL (ref 33–37)
MCV RBC AUTO: 95.3 FL (ref 81–99)
MONOCYTES # BLD AUTO: 0.88 10*3/UL (ref 0.2–1.2)
MONOCYTES NFR BLD AUTO: 8.8 % (ref 3–10)
NEUTROPHILS # BLD AUTO: 4.81 10*3/UL (ref 2–8)
NEUTROPHILS NFR BLD AUTO: 48.4 % (ref 30–85)
NRBC CBCN: 0 % (ref 0–0.7)
OSMOLALITY SERPL CALC.SUM OF ELEC: 306 MOSM/KG (ref 273–304)
PHOSPHATE SERPL-MCNC: 4 MG/DL (ref 2.4–4.5)
PLATELET # BLD AUTO: 232 10*3/UL (ref 130–400)
PMV BLD AUTO: 10.7 FL (ref 9.4–12.3)
POTASSIUM SERPL-SCNC: 4.8 MMOL/L (ref 3.5–5.3)
RBC # BLD AUTO: 3.86 10*6/UL (ref 4.2–5.4)
SODIUM SERPL-SCNC: 138 MMOL/L (ref 135–147)
WBC # BLD AUTO: 9.95 10*3/UL (ref 4.8–10.8)

## 2020-10-22 LAB
25(OH)D3 SERPL-MCNC: 61.9 NG/ML (ref 30–100)
PTH-INTACT SERPL-MCNC: 93.3 PG/ML (ref 11.1–79.5)

## 2021-01-13 ENCOUNTER — HOSPITAL ENCOUNTER (OUTPATIENT)
Dept: LAB | Facility: HOSPITAL | Age: 81
Discharge: HOME | End: 2021-01-13
Attending: INTERNAL MEDICINE

## 2021-01-13 LAB
ALBUMIN SERPL-MCNC: 3.8 G/DL (ref 3.5–5)
ALBUMIN SERPL-MCNC: 4 G/DL (ref 3.5–5)
ALBUMIN/GLOB SERPL: 1.1 {RATIO} (ref 1.4–2.6)
ALP SERPL-CCNC: 55 U/L (ref 43–160)
ALT SERPL-CCNC: 11 U/L (ref 10–40)
ANION GAP SERPL CALC-SCNC: 18 MMOL/L (ref 8–19)
ANION GAP SERPL CALC-SCNC: 20 MMOL/L (ref 8–19)
APPEARANCE UR: ABNORMAL
AST SERPL-CCNC: 16 U/L (ref 15–50)
BASOPHILS # BLD AUTO: 0.08 10*3/UL (ref 0–0.2)
BASOPHILS NFR BLD AUTO: 0.7 % (ref 0–3)
BILIRUB SERPL-MCNC: 0.32 MG/DL (ref 0.2–1.3)
BILIRUB UR QL: NEGATIVE
BUN SERPL-MCNC: 57 MG/DL (ref 5–25)
BUN SERPL-MCNC: 58 MG/DL (ref 5–25)
BUN/CREAT SERPL: 24 {RATIO} (ref 6–20)
BUN/CREAT SERPL: 24 {RATIO} (ref 6–20)
CALCIUM SERPL-MCNC: 8.9 MG/DL (ref 8.7–10.4)
CALCIUM SERPL-MCNC: 8.9 MG/DL (ref 8.7–10.4)
CASTS URNS QL MICRO: ABNORMAL /[LPF]
CHLORIDE SERPL-SCNC: 101 MMOL/L (ref 99–111)
CHLORIDE SERPL-SCNC: 102 MMOL/L (ref 99–111)
CHOLEST SERPL-MCNC: 152 MG/DL (ref 107–200)
CHOLEST/HDLC SERPL: 4.1 {RATIO} (ref 3–6)
COLOR UR: YELLOW
CONV ABS IMM GRAN: 0.03 10*3/UL (ref 0–0.2)
CONV BACTERIA: ABNORMAL
CONV CO2: 23 MMOL/L (ref 22–32)
CONV CO2: 23 MMOL/L (ref 22–32)
CONV COLLECTION SOURCE (UA): ABNORMAL
CONV CREATININE URINE, RANDOM: 173.1 MG/DL (ref 10–300)
CONV HYALINE CASTS IN URINE MICRO: ABNORMAL /[LPF]
CONV IMMATURE GRAN: 0.3 % (ref 0–1.8)
CONV PROTEIN TO CREATININE RATIO (RANDOM URINE): 0.22 {RATIO} (ref 0–0.1)
CONV TOTAL PROTEIN: 7.2 G/DL (ref 6.3–8.2)
CONV UROBILINOGEN IN URINE BY AUTOMATED TEST STRIP: 0.2 {EHRLICHU}/DL (ref 0.1–1)
CREAT UR-MCNC: 2.41 MG/DL (ref 0.5–0.9)
CREAT UR-MCNC: 2.46 MG/DL (ref 0.5–0.9)
DEPRECATED RDW RBC AUTO: 44.2 FL (ref 36.4–46.3)
EOSINOPHIL # BLD AUTO: 0.7 10*3/UL (ref 0–0.7)
EOSINOPHIL # BLD AUTO: 6.4 % (ref 0–7)
ERYTHROCYTE [DISTWIDTH] IN BLOOD BY AUTOMATED COUNT: 12.9 % (ref 11.7–14.4)
GFR SERPLBLD BASED ON 1.73 SQ M-ARVRAT: 18 ML/MIN/{1.73_M2}
GFR SERPLBLD BASED ON 1.73 SQ M-ARVRAT: 19 ML/MIN/{1.73_M2}
GLOBULIN UR ELPH-MCNC: 3.4 G/DL (ref 2–3.5)
GLUCOSE SERPL-MCNC: 105 MG/DL (ref 65–99)
GLUCOSE SERPL-MCNC: 107 MG/DL (ref 65–99)
GLUCOSE UR QL: NEGATIVE MG/DL
HCT VFR BLD AUTO: 38 % (ref 37–47)
HDLC SERPL-MCNC: 37 MG/DL (ref 40–60)
HGB BLD-MCNC: 12.1 G/DL (ref 12–16)
HGB UR QL STRIP: ABNORMAL
KETONES UR QL STRIP: NEGATIVE MG/DL
LDLC SERPL CALC-MCNC: 72 MG/DL (ref 70–100)
LEUKOCYTE ESTERASE UR QL STRIP: ABNORMAL
LYMPHOCYTES # BLD AUTO: 4.04 10*3/UL (ref 1–5)
LYMPHOCYTES NFR BLD AUTO: 36.9 % (ref 20–45)
MCH RBC QN AUTO: 30 PG (ref 27–31)
MCHC RBC AUTO-ENTMCNC: 31.8 G/DL (ref 33–37)
MCV RBC AUTO: 94.1 FL (ref 81–99)
MONOCYTES # BLD AUTO: 0.77 10*3/UL (ref 0.2–1.2)
MONOCYTES NFR BLD AUTO: 7 % (ref 3–10)
NEUTROPHILS # BLD AUTO: 5.33 10*3/UL (ref 2–8)
NEUTROPHILS NFR BLD AUTO: 48.7 % (ref 30–85)
NITRITE UR QL STRIP: POSITIVE
NRBC CBCN: 0 % (ref 0–0.7)
OSMOLALITY SERPL CALC.SUM OF ELEC: 305 MOSM/KG (ref 273–304)
PH UR STRIP.AUTO: 5 [PH] (ref 5–8)
PHOSPHATE SERPL-MCNC: 3.6 MG/DL (ref 2.4–4.5)
PLATELET # BLD AUTO: 235 10*3/UL (ref 130–400)
PMV BLD AUTO: 10.3 FL (ref 9.4–12.3)
POTASSIUM SERPL-SCNC: 4.4 MMOL/L (ref 3.5–5.3)
POTASSIUM SERPL-SCNC: 4.4 MMOL/L (ref 3.5–5.3)
PROT UR QL: 30 MG/DL
PROT UR-MCNC: 38.7 MG/DL
RBC # BLD AUTO: 4.04 10*6/UL (ref 4.2–5.4)
RBC #/AREA URNS HPF: ABNORMAL /[HPF]
SODIUM SERPL-SCNC: 139 MMOL/L (ref 135–147)
SODIUM SERPL-SCNC: 140 MMOL/L (ref 135–147)
SP GR UR: 1.02 (ref 1–1.03)
SQUAMOUS SPT QL MICRO: ABNORMAL /[HPF]
TRIGL SERPL-MCNC: 213 MG/DL (ref 40–150)
VLDLC SERPL-MCNC: 43 MG/DL (ref 5–37)
WBC # BLD AUTO: 10.95 10*3/UL (ref 4.8–10.8)
WBC #/AREA URNS HPF: ABNORMAL /[HPF]

## 2021-01-26 ENCOUNTER — HOSPITAL ENCOUNTER (OUTPATIENT)
Dept: OTHER | Facility: HOSPITAL | Age: 81
Discharge: HOME | End: 2021-01-26
Attending: INTERNAL MEDICINE

## 2021-02-11 ENCOUNTER — TELEPHONE CONVERTED (OUTPATIENT)
Dept: CARDIOLOGY | Facility: CLINIC | Age: 81
End: 2021-02-11
Attending: NURSE PRACTITIONER

## 2021-02-19 ENCOUNTER — HOSPITAL ENCOUNTER (OUTPATIENT)
Dept: VACCINE CLINIC | Facility: HOSPITAL | Age: 81
Discharge: HOME | End: 2021-02-19
Attending: INTERNAL MEDICINE

## 2021-02-26 ENCOUNTER — CONVERSION ENCOUNTER (OUTPATIENT)
Dept: CARDIOLOGY | Facility: CLINIC | Age: 81
End: 2021-02-26
Attending: INTERNAL MEDICINE

## 2021-03-22 ENCOUNTER — OFFICE VISIT CONVERTED (OUTPATIENT)
Dept: CARDIOLOGY | Facility: CLINIC | Age: 81
End: 2021-03-22
Attending: INTERNAL MEDICINE

## 2021-03-23 ENCOUNTER — HOSPITAL ENCOUNTER (OUTPATIENT)
Dept: GENERAL RADIOLOGY | Facility: HOSPITAL | Age: 81
Discharge: HOME | End: 2021-03-23
Attending: INTERNAL MEDICINE

## 2021-05-10 NOTE — PROCEDURES
"   Procedure Note      Patient Name: Mercedes Felipe   Patient ID: [id number]   Sex: Female   YOB: 1940    Primary Care Provider: Rito Barahona MD   Referring Provider: Rito Barahona MD    Visit Date: February 26, 2021    Provider: Tramaine Ramsey MD   [facility] [facility]   Location Address: [address] [place] [address], [address]   [place]  [id number]   Location Phone: [phone]          FINAL REPORT   TRANSTHORACIC ECHOCARDIOGRAM REPORT    Diagnosis: Shortness of breath   Height: 5'7\" Weight: 201 B/P: 130/74 BSA: 2.1   Tech: PiczoTW   MEASUREMENTS:  RVID (Diastole) : RVID. (NORMAL: 0.7 to 2.4 cm max)   LVID (Systole): 2.8 cm (Diastole): 4.0 cm . (NORMAL: 3.7 - 5.4 cm)   Posterior Wall Thickness (Diastole): 1.3 cm. (NORMAL: 0.8 - 1.1 cm)   Septal Thickness (Diastole): 1.3 cm. (NORMAL: 0.7 - 1.2 cm)   LAID (Systole): 3.0 cm. (NORMAL: 1.9 - 3.8 cm)   Aortic Root Diameter (Diastole): 3.5 cm. (NORMAL: 2.0 - 3.7 cm)   COMMENTS:  The patient underwent 2-D, M-Mode, and Doppler examination, including pulse-wave, continuous-wave, and color-flow analysis; the study is technically adequate.   FINDINGS:  MITRAL VALVE: Mild fibrocalcific changes noted.   AORTIC VALVE: Mild fibrocalcific changes noted of a trileaflet aortic valve with adequate opening of the aortic valve leaflets.   TRICUSPID VALVE: Normal.   PULMONIC VALVE: Normal with mild pulmonic regurgitation present with estimated pulmonary artery diastolic pressure of 13-18 mmHg.   LEFT ATRIUM: Normal; no masses seen. LA volume is 26 mL/m2.   AORTIC ROOT: Normal in size and motion.   LEFT VENTRICLE: The left ventricular chamber size is normal. The left ventricular wall thickness is increased with mild left ventricular hypertrophy present. The overall left ventricular systolic function is normal with an estimated EF of 65%. No significant regional wall motion abnormalities are identified.   RIGHT ATRIUM: Normal.   RIGHT VENTRICLE: Normal size and function. "   PERICARDIUM: No effusion.   INFERIOR VENA CAVA: Diameter is 2.6 cm with less than 50% reduction with inspiration.   DOPPLER: Pulse-wave, continuous wave, and color-flow Doppler evaluation was performed. E/A ratio is 0.7. DT= 218 msec. IVRT is 77 msec. E/E' is 19.   Faxed: 03/03/2021      CONCLUSION:  1.   Left ventricular chamber size is normal. The left ventricular wall thickness is increased with mild left        ventricular hypertrophy present. The overall left ventricular systolic function is normal with an estimated EF        of 65%. No significant regional wall motion abnormalities are identified.   2.  Left ventricular diastolic dysfunction.  3.  Pulmonic regurgitation with estimated pulmonary artery diastolic pressure of 13-18  mmHg.          Tramaine Ramsey MD, FACC  PM/pap                   Electronically Signed by: Tramaine Ramsey MD -Author on March 12, 2021 10:03:51 PM

## 2021-05-14 VITALS
DIASTOLIC BLOOD PRESSURE: 48 MMHG | WEIGHT: 200 LBS | HEIGHT: 67 IN | SYSTOLIC BLOOD PRESSURE: 122 MMHG | HEART RATE: 83 BPM | BODY MASS INDEX: 31.39 KG/M2

## 2021-05-14 NOTE — PROGRESS NOTES
Progress Note      Patient Name: Mercedes Felipe   Patient ID: [id number]   Sex: Female   YOB: 1940    Primary Care Provider: Rito Barahona MD   Referring Provider: Rito Barahona MD    Visit Date: February 11, 2021    Provider: CAS Clayton   Location: [facility]   Location Address: [address] [place] [address], [facility]   [place]  [id number]   Location Phone: [phone]          Chief Complaint     Chest pain.  Shortness of breath.       History Of Present Illness  TELEHEALTH TELEPHONE VISIT  Mercedes Felipe is a 80 year old /White female who states about 3 or 4 weeks ago she started having chest pain again. It is described as being in the midsternal area and going through to her back with a sharp chest pain. It could last for hours. She noticed it with fried foods and then with spicy foods and it usually relieved with Tylenol and Tums. She states she has noticed it some with walking but not as bad, and she admits she has been changing her diet the last few weeks, so she feels like it is better without the fat and spice in her diet. She complains of increasing shortness of breath whenever she does any activities. She denies any palpitations, swelling, dizziness, syncope, PND, or orthopnea. She admits she does not walk much, because she gets short of breath with walking. Her blood pressure is 111/70 with a heart rate of 81 this morning; weight was 200. She is having issues with constipation. Evaluation via telehealth telephone visit. Verbal consent obtained before beginning visit.   Provider spent 20 minutes with the patient during the telehealth visit.   The following staff were present during this visit: Provider only.      PAST MEDICAL HISTORY:  Chronic kidney disease; Coronary artery disease with 100% occluded right coronary artery with collaterals demonstrated on cardiac catheterization in 2007; Diabetes mellitus; Hyperlipidemia; Hypertension; Hypothyroidism.      CURRENT  "MEDICATIONS:  Atorvastatin 80 mg q. h.s.; Trulicity daily; vitamin D3 5,000 units daily; Zyrtec 10 mg daily; losartan 50 mg daily; carvedilol 12.5 mg b.i.d.; Synthroid 0.0125 mg daily; Lasix 40 mg daily and Lasix 20 mg three times a week; sublingual nitroglycerin 0.4 mg p.r.n.; Colace p.r.n.; Lantus daily; folic acid 400 mcg; vitamin B12 1,000 mcg; Trelegy; ProAir.      ALLERGIES:  PENICILLIN; SULFA (SULFONAMIDES); sulfamethoxazole.       Vitals     Per patient, at-home vitals:  Blood pressure 111/70.  Heart rate 81.  Weight 200.  Height 5'7\".       Physical Examination  · Labs  o Labs  o : Blood sugar 105, BUN 58, creatinine 2.46. She has an appointment with Dr. Cotto in the next week. Total cholesterol 152, triglycerides 213, HDL 37, LDL 72 which is a little bit better but not quite to goal.           Assessment     1.  Chest pain.  2.  Shortness of breath.  3.  Coronary artery disease with occluded right coronary artery with collaterals.  4.  Chronic kidney disease, stage 4, stable.  5.  Hypertension, controlled.  6.  Hyperlipidemia, needs tighter control.  7.  GI and constipation issues, possibly related to atorvastatin.       Plan     1.  We will give her NitroPatch 0.2 mg/hour to help with her chest pain.    2.  Stop atorvastatin.   3.  Start rosuvastatin 40 mg once a day.  4.  She will call us in 2 weeks to let us know how she feels with the chest discomfort, and at that time, we will        decide about a stress test.    5.  We will do an echocardiogram to evaluate her shortness of breath.  6.  Check a Lipid and CMP in 3 months.  7.  She will continue her losartan, carvedilol, and Lasix for hypertension and coronary artery disease.  8.  Follow up in 6 weeks or earlier if needed.        CAS Arreola  JF:madina             Electronically Signed by: Gayle Ruth-, Other -Author on February 12, 2021 08:43:40 PM  Electronically Co-signed by: CAS Clayton -Reviewer on February " 17, 2021 01:15:13 PM

## 2021-05-14 NOTE — PROGRESS NOTES
Progress Note      Patient Name: Mercedes Felipe   Patient ID: [id number]   Sex: Female   YOB: 1940    Primary Care Provider: Rito Barahona MD   Referring Provider: Rito Barahona MD    Visit Date: March 22, 2021    Provider: Tramaine Ramsey MD   Location: [facility]   Location Address: [address] [place] [address], [address]   [place]  [id number]   Location Phone: [phone]          Chief Complaint     Followup of shortness of breath, angina pectoris, and chronic kidney disease.       History Of Present Illness  REFERRING PROVIDER: Rito Barahona MD   Mercedes Felipe is a 80 year old /White female with coronary artery disease, hypertension, chronic lung disease, and chronic kidney disease who states that she is feeling better since being put on the NitroPatch and the changes in her medications. She denies any chest pain. Her shortness of breath is stable. Intermittently, she will get an epigastric and lower chest discomfort with spaghetti or fatty goods. She has learned to avoid them. She had a bad spells two days ago after eating spaghetti. She denies dizziness or syncope.   PAST MEDICAL HISTORY: Chronic kidney disease; Coronary artery disease with 100% occluded right coronary artery with collaterals demonstrated on cardiac catheterization in 2007; Diabetes mellitus; Hyperlipidemia; Hypertension; Hypothyroidism.   PSYCHOSOCIAL HISTORY: Previously smoked, but quit. Rarely consumes alcohol.   CURRENT MEDICATIONS: Rosuvastatin 40 mg daily; NitroPatch 0.2 mg/hour daily discontinue q. h.s.; Trulicity daily; vitamin D3 5,000 units daily; Zyrtec 10 mg daily; losartan 50 mg daily; carvedilol 12.5 mg b.i.d.; Synthroid 0.0125 mg daily; Lasix 40 mg daily and Lasix 20 mg three times a week; sublingual nitroglycerin 0.4 mg p.r.n.; Colace p.r.n.; Lantus daily; folic acid 400 mcg; vitamin B12 1,000 mcg; Trelegy; ProAir.      ALLERGIES: PENICILLIN; SULFA (SULFONAMIDES); sulfamethoxazole.       Review of  "Systems  · Cardiovascular  o Admits  o : swelling (feet, ankles, hands), shortness of breath while walking or lying flat, chest pain or angina pectoris   o Denies  o : palpitations (fast, fluttering, or skipping beats)  · Respiratory  o Admits  o : chronic or frequent cough      Vitals  Date Time BP Position Site L\R Cuff Size HR RR TEMP (F) WT  HT  BMI kg/m2 BSA m2 O2 Sat FR L/min FiO2 HC       03/22/2021 01:55 /48 Sitting    83 - R   200lbs 0oz 5'  7\" 31.32 2.07       03/22/2021 01:55 /45 Sitting    81 - R                   Physical Examination  · Constitutional  o Appearance  o : Awake, alert, in no acute distress.  · Respiratory  o Respiratory  o : Increased AP diameter with diminished breath sounds. Prolonged expiration. Negative rales or rhonchi.   · Cardiovascular  o Heart  o : PMI not well felt. Heart sounds are distant. S1, S2 normal. No S3. No S4. Negative systolic/diastolic murmur.   o Peripheral Vascular System  o :   § Extremities  § : Trace edema. Good femoral and pedal pulses.   · Gastrointestinal  o Abdominal Examination  o : Soft. No masses or tenderness felt. No hepatosplenomegaly. Abdominal aorta is not palpable.  · EKG  o EKG  o : Performed in the office today.  o Indications  o : Coronary artery disease.   o Results  o : Sinus rhythm. Borderline low voltage, extremity leads. Mean heart rate of 77 beats per minute.  o Comparison  o : No significant change compared to previous EKG.           Assessment     1.  Coronary artery disease with stable angina pectoris.  2.  Epigastric and lower abdominal discomfort, most likely GI related.    3.  Hypertension, controlled.  4.  Hyperlipidemia.  5.  Chronic kidney disease, stage 4, stable.       Plan     1.  We will obtain an ultrasound of her abdomen to check for causes of her abdominal discomfort, including        possibly gallbladder disease.    2.  She will try to exercise as much as possible and eat better to lose weight.  3.  Follow up in " 6-7 months.        Tramaine Ramsey MD, [facility]  PM:vm             Electronically Signed by: Gayle Ruth-, Other -Author on April 1, 2021 02:16:55 PM  Electronically Co-signed by: Tramaine Ramsey MD -Reviewer on April 3, 2021 01:55:12 PM

## 2021-05-15 VITALS
HEIGHT: 67 IN | BODY MASS INDEX: 31.55 KG/M2 | DIASTOLIC BLOOD PRESSURE: 74 MMHG | SYSTOLIC BLOOD PRESSURE: 130 MMHG | HEART RATE: 94 BPM | WEIGHT: 201 LBS

## 2021-05-15 VITALS
DIASTOLIC BLOOD PRESSURE: 76 MMHG | SYSTOLIC BLOOD PRESSURE: 112 MMHG | HEIGHT: 67 IN | BODY MASS INDEX: 31.86 KG/M2 | WEIGHT: 203 LBS | HEART RATE: 84 BPM

## 2021-05-16 VITALS
WEIGHT: 207 LBS | HEART RATE: 87 BPM | HEIGHT: 67 IN | OXYGEN SATURATION: 89 % | BODY MASS INDEX: 32.49 KG/M2 | DIASTOLIC BLOOD PRESSURE: 52 MMHG | SYSTOLIC BLOOD PRESSURE: 127 MMHG

## 2021-05-16 VITALS
WEIGHT: 199 LBS | HEART RATE: 72 BPM | DIASTOLIC BLOOD PRESSURE: 76 MMHG | BODY MASS INDEX: 31.23 KG/M2 | HEIGHT: 67 IN | SYSTOLIC BLOOD PRESSURE: 120 MMHG

## 2021-05-16 VITALS
SYSTOLIC BLOOD PRESSURE: 124 MMHG | BODY MASS INDEX: 32.02 KG/M2 | HEART RATE: 80 BPM | HEIGHT: 67 IN | DIASTOLIC BLOOD PRESSURE: 74 MMHG | WEIGHT: 204 LBS

## 2021-05-26 ENCOUNTER — HOSPITAL ENCOUNTER (OUTPATIENT)
Dept: LAB | Facility: HOSPITAL | Age: 81
Discharge: HOME | End: 2021-05-26
Attending: INTERNAL MEDICINE

## 2021-05-26 LAB
25(OH)D3 SERPL-MCNC: 51.9 NG/ML (ref 30–100)
ALBUMIN SERPL-MCNC: 3.8 G/DL (ref 3.5–5)
ALBUMIN SERPL-MCNC: 4.1 G/DL (ref 3.5–5)
ALBUMIN/GLOB SERPL: 1.1 {RATIO} (ref 1.4–2.6)
ALP SERPL-CCNC: 52 U/L (ref 43–160)
ALT SERPL-CCNC: 9 U/L (ref 10–40)
ANION GAP SERPL CALC-SCNC: 21 MMOL/L (ref 8–19)
ANION GAP SERPL CALC-SCNC: 22 MMOL/L (ref 8–19)
APPEARANCE UR: ABNORMAL
AST SERPL-CCNC: 11 U/L (ref 15–50)
BASOPHILS # BLD AUTO: 0.08 10*3/UL (ref 0–0.2)
BASOPHILS NFR BLD AUTO: 0.7 % (ref 0–3)
BILIRUB SERPL-MCNC: 0.25 MG/DL (ref 0.2–1.3)
BILIRUB UR QL: NEGATIVE
BUN SERPL-MCNC: 80 MG/DL (ref 5–25)
BUN SERPL-MCNC: 81 MG/DL (ref 5–25)
BUN/CREAT SERPL: 17 {RATIO} (ref 6–20)
BUN/CREAT SERPL: 18 {RATIO} (ref 6–20)
CALCIUM SERPL-MCNC: 8.5 MG/DL (ref 8.7–10.4)
CALCIUM SERPL-MCNC: 8.7 MG/DL (ref 8.7–10.4)
CASTS URNS QL MICRO: ABNORMAL /[LPF]
CHLORIDE SERPL-SCNC: 104 MMOL/L (ref 99–111)
CHLORIDE SERPL-SCNC: 105 MMOL/L (ref 99–111)
CHOLEST SERPL-MCNC: 126 MG/DL (ref 107–200)
CHOLEST/HDLC SERPL: 3.6 {RATIO} (ref 3–6)
COLOR UR: YELLOW
CONV ABS IMM GRAN: 0.05 10*3/UL (ref 0–0.2)
CONV BACTERIA: ABNORMAL
CONV CO2: 17 MMOL/L (ref 22–32)
CONV CO2: 18 MMOL/L (ref 22–32)
CONV COLLECTION SOURCE (UA): ABNORMAL
CONV CREATININE URINE, RANDOM: 131.1 MG/DL (ref 10–300)
CONV HYALINE CASTS IN URINE MICRO: ABNORMAL /[LPF]
CONV IMMATURE GRAN: 0.4 % (ref 0–1.8)
CONV PROTEIN TO CREATININE RATIO (RANDOM URINE): 1.1 {RATIO} (ref 0–0.1)
CONV TOTAL PROTEIN: 7.9 G/DL (ref 6.3–8.2)
CONV UROBILINOGEN IN URINE BY AUTOMATED TEST STRIP: 0.2 {EHRLICHU}/DL (ref 0.1–1)
CREAT UR-MCNC: 4.56 MG/DL (ref 0.5–0.9)
CREAT UR-MCNC: 4.86 MG/DL (ref 0.5–0.9)
DEPRECATED RDW RBC AUTO: 46 FL (ref 36.4–46.3)
EOSINOPHIL # BLD AUTO: 0.83 10*3/UL (ref 0–0.7)
EOSINOPHIL # BLD AUTO: 7 % (ref 0–7)
ERYTHROCYTE [DISTWIDTH] IN BLOOD BY AUTOMATED COUNT: 13.5 % (ref 11.7–14.4)
GFR SERPLBLD BASED ON 1.73 SQ M-ARVRAT: 8 ML/MIN/{1.73_M2}
GFR SERPLBLD BASED ON 1.73 SQ M-ARVRAT: 9 ML/MIN/{1.73_M2}
GLOBULIN UR ELPH-MCNC: 3.8 G/DL (ref 2–3.5)
GLUCOSE SERPL-MCNC: 104 MG/DL (ref 65–99)
GLUCOSE SERPL-MCNC: 99 MG/DL (ref 65–99)
GLUCOSE UR QL: NEGATIVE MG/DL
HCT VFR BLD AUTO: 33.5 % (ref 37–47)
HDLC SERPL-MCNC: 35 MG/DL (ref 40–60)
HGB BLD-MCNC: 10.8 G/DL (ref 12–16)
HGB UR QL STRIP: ABNORMAL
KETONES UR QL STRIP: NEGATIVE MG/DL
LDLC SERPL CALC-MCNC: 56 MG/DL (ref 70–100)
LEUKOCYTE ESTERASE UR QL STRIP: ABNORMAL
LYMPHOCYTES # BLD AUTO: 3.21 10*3/UL (ref 1–5)
LYMPHOCYTES NFR BLD AUTO: 27.2 % (ref 20–45)
MCH RBC QN AUTO: 30.1 PG (ref 27–31)
MCHC RBC AUTO-ENTMCNC: 32.2 G/DL (ref 33–37)
MCV RBC AUTO: 93.3 FL (ref 81–99)
MONOCYTES # BLD AUTO: 0.87 10*3/UL (ref 0.2–1.2)
MONOCYTES NFR BLD AUTO: 7.4 % (ref 3–10)
NEUTROPHILS # BLD AUTO: 6.75 10*3/UL (ref 2–8)
NEUTROPHILS NFR BLD AUTO: 57.3 % (ref 30–85)
NITRITE UR QL STRIP: NEGATIVE
NRBC CBCN: 0 % (ref 0–0.7)
OSMOLALITY SERPL CALC.SUM OF ELEC: 313 MOSM/KG (ref 273–304)
PH UR STRIP.AUTO: 5 [PH] (ref 5–8)
PHOSPHATE SERPL-MCNC: 7.1 MG/DL (ref 2.4–4.5)
PLATELET # BLD AUTO: 225 10*3/UL (ref 130–400)
PMV BLD AUTO: 10.7 FL (ref 9.4–12.3)
POTASSIUM SERPL-SCNC: 4.5 MMOL/L (ref 3.5–5.3)
POTASSIUM SERPL-SCNC: 4.6 MMOL/L (ref 3.5–5.3)
PROT UR QL: 100 MG/DL
PROT UR-MCNC: 144.1 MG/DL
PTH-INTACT SERPL-MCNC: 149.1 PG/ML (ref 11.1–79.5)
RBC # BLD AUTO: 3.59 10*6/UL (ref 4.2–5.4)
RBC #/AREA URNS HPF: ABNORMAL /[HPF]
SODIUM SERPL-SCNC: 138 MMOL/L (ref 135–147)
SODIUM SERPL-SCNC: 139 MMOL/L (ref 135–147)
SP GR UR: 1.02 (ref 1–1.03)
SQUAMOUS SPT QL MICRO: ABNORMAL /[HPF]
TRIGL SERPL-MCNC: 175 MG/DL (ref 40–150)
VLDLC SERPL-MCNC: 35 MG/DL (ref 5–37)
WBC # BLD AUTO: 11.79 10*3/UL (ref 4.8–10.8)
WBC #/AREA URNS HPF: ABNORMAL /[HPF]

## 2021-06-21 ENCOUNTER — TRANSCRIBE ORDERS (OUTPATIENT)
Dept: LAB | Facility: HOSPITAL | Age: 81
End: 2021-06-21

## 2021-06-21 ENCOUNTER — LAB (OUTPATIENT)
Dept: LAB | Facility: HOSPITAL | Age: 81
End: 2021-06-21

## 2021-06-21 DIAGNOSIS — D64.9 ANEMIA, UNSPECIFIED TYPE: ICD-10-CM

## 2021-06-21 DIAGNOSIS — N18.4 CHRONIC KIDNEY DISEASE, STAGE IV (SEVERE) (HCC): ICD-10-CM

## 2021-06-21 DIAGNOSIS — N18.4 CHRONIC KIDNEY DISEASE, STAGE IV (SEVERE) (HCC): Primary | ICD-10-CM

## 2021-06-21 LAB
ALBUMIN SERPL-MCNC: 4 G/DL (ref 3.5–5.2)
ANION GAP SERPL CALCULATED.3IONS-SCNC: 10.9 MMOL/L (ref 5–15)
BASOPHILS # BLD AUTO: 0.07 10*3/MM3 (ref 0–0.2)
BASOPHILS NFR BLD AUTO: 0.6 % (ref 0–1.5)
BUN SERPL-MCNC: 61 MG/DL (ref 8–23)
BUN/CREAT SERPL: 21.9 (ref 7–25)
CALCIUM SPEC-SCNC: 9.6 MG/DL (ref 8.6–10.5)
CHLORIDE SERPL-SCNC: 101 MMOL/L (ref 98–107)
CO2 SERPL-SCNC: 22.1 MMOL/L (ref 22–29)
CREAT SERPL-MCNC: 2.78 MG/DL (ref 0.57–1)
DEPRECATED RDW RBC AUTO: 46.5 FL (ref 37–54)
EOSINOPHIL # BLD AUTO: 0.63 10*3/MM3 (ref 0–0.4)
EOSINOPHIL NFR BLD AUTO: 5.2 % (ref 0.3–6.2)
ERYTHROCYTE [DISTWIDTH] IN BLOOD BY AUTOMATED COUNT: 13.1 % (ref 12.3–15.4)
GFR SERPL CREATININE-BSD FRML MDRD: 16 ML/MIN/1.73
GLUCOSE SERPL-MCNC: 249 MG/DL (ref 65–99)
HCT VFR BLD AUTO: 33.6 % (ref 34–46.6)
HGB BLD-MCNC: 11.1 G/DL (ref 12–15.9)
IMM GRANULOCYTES # BLD AUTO: 0.06 10*3/MM3 (ref 0–0.05)
IMM GRANULOCYTES NFR BLD AUTO: 0.5 % (ref 0–0.5)
IRON 24H UR-MRATE: 75 MCG/DL (ref 37–145)
IRON SATN MFR SERPL: 22 % (ref 20–50)
LYMPHOCYTES # BLD AUTO: 2.66 10*3/MM3 (ref 0.7–3.1)
LYMPHOCYTES NFR BLD AUTO: 21.9 % (ref 19.6–45.3)
MCH RBC QN AUTO: 31.4 PG (ref 26.6–33)
MCHC RBC AUTO-ENTMCNC: 33 G/DL (ref 31.5–35.7)
MCV RBC AUTO: 94.9 FL (ref 79–97)
MONOCYTES # BLD AUTO: 0.8 10*3/MM3 (ref 0.1–0.9)
MONOCYTES NFR BLD AUTO: 6.6 % (ref 5–12)
NEUTROPHILS NFR BLD AUTO: 65.2 % (ref 42.7–76)
NEUTROPHILS NFR BLD AUTO: 7.95 10*3/MM3 (ref 1.7–7)
NRBC BLD AUTO-RTO: 0 /100 WBC (ref 0–0.2)
PHOSPHATE SERPL-MCNC: 4.6 MG/DL (ref 2.5–4.5)
PLATELET # BLD AUTO: 261 10*3/MM3 (ref 140–450)
PMV BLD AUTO: 10.4 FL (ref 6–12)
POTASSIUM SERPL-SCNC: 5.8 MMOL/L (ref 3.5–5.2)
RBC # BLD AUTO: 3.54 10*6/MM3 (ref 3.77–5.28)
SODIUM SERPL-SCNC: 134 MMOL/L (ref 136–145)
TIBC SERPL-MCNC: 337 MCG/DL (ref 298–536)
TRANSFERRIN SERPL-MCNC: 226 MG/DL (ref 200–360)
WBC # BLD AUTO: 12.17 10*3/MM3 (ref 3.4–10.8)

## 2021-06-21 PROCEDURE — 83540 ASSAY OF IRON: CPT

## 2021-06-21 PROCEDURE — 85025 COMPLETE CBC W/AUTO DIFF WBC: CPT

## 2021-06-21 PROCEDURE — 80069 RENAL FUNCTION PANEL: CPT

## 2021-06-21 PROCEDURE — 36415 COLL VENOUS BLD VENIPUNCTURE: CPT

## 2021-06-21 PROCEDURE — 82728 ASSAY OF FERRITIN: CPT

## 2021-06-21 PROCEDURE — 84466 ASSAY OF TRANSFERRIN: CPT

## 2021-06-22 LAB — FERRITIN SERPL-MCNC: 229 NG/ML (ref 13–150)

## 2021-07-26 ENCOUNTER — TRANSCRIBE ORDERS (OUTPATIENT)
Dept: LAB | Facility: HOSPITAL | Age: 81
End: 2021-07-26

## 2021-07-26 ENCOUNTER — LAB (OUTPATIENT)
Dept: LAB | Facility: HOSPITAL | Age: 81
End: 2021-07-26

## 2021-07-26 DIAGNOSIS — E83.42 HYPOMAGNESEMIA: ICD-10-CM

## 2021-07-26 DIAGNOSIS — N18.4 CHRONIC KIDNEY DISEASE, STAGE IV (SEVERE) (HCC): ICD-10-CM

## 2021-07-26 DIAGNOSIS — N18.4 CHRONIC KIDNEY DISEASE, STAGE IV (SEVERE) (HCC): Primary | ICD-10-CM

## 2021-07-26 LAB
25(OH)D3 SERPL-MCNC: 56.1 NG/ML
ALBUMIN SERPL-MCNC: 4 G/DL (ref 3.5–5.2)
ANION GAP SERPL CALCULATED.3IONS-SCNC: 17.6 MMOL/L (ref 5–15)
BACTERIA UR QL AUTO: ABNORMAL /HPF
BASOPHILS # BLD AUTO: 0.1 10*3/MM3 (ref 0–0.2)
BASOPHILS NFR BLD AUTO: 0.8 % (ref 0–1.5)
BILIRUB UR QL STRIP: NEGATIVE
BUN SERPL-MCNC: 33 MG/DL (ref 8–23)
BUN/CREAT SERPL: 13.9 (ref 7–25)
CALCIUM SPEC-SCNC: 8.8 MG/DL (ref 8.6–10.5)
CHLORIDE SERPL-SCNC: 96 MMOL/L (ref 98–107)
CLARITY UR: ABNORMAL
CO2 SERPL-SCNC: 21.4 MMOL/L (ref 22–29)
COLLECT DURATION TIME UR: 24 HRS
COLOR UR: YELLOW
CREAT CL 24H UR+SERPL-VRATE: 21.2 ML/MIN (ref 88–128)
CREAT CL 24H UR+SERPL-VRATE: 30.6 L/24 HR (ref 126.7–184.3)
CREAT SERPL-MCNC: 2.38 MG/DL (ref 0.57–1)
CREAT SERPL-MCNC: 2.38 MG/DL (ref 0.57–1)
CREAT UR-MCNC: 238.3 MG/DL
CREAT UR-MCNC: 50 MG/DL
CREATINE 24H UR-MRATE: 0.85 G/24 HR (ref 0.7–1.6)
DEPRECATED RDW RBC AUTO: 41.5 FL (ref 37–54)
EOSINOPHIL # BLD AUTO: 0.4 10*3/MM3 (ref 0–0.4)
EOSINOPHIL NFR BLD AUTO: 3 % (ref 0.3–6.2)
ERYTHROCYTE [DISTWIDTH] IN BLOOD BY AUTOMATED COUNT: 12.7 % (ref 12.3–15.4)
GFR SERPL CREATININE-BSD FRML MDRD: 20 ML/MIN/1.73
GFR SERPL CREATININE-BSD FRML MDRD: 20 ML/MIN/1.73
GLUCOSE SERPL-MCNC: 222 MG/DL (ref 65–99)
GLUCOSE UR STRIP-MCNC: NEGATIVE MG/DL
GRAN CASTS URNS QL MICRO: ABNORMAL /LPF
HCT VFR BLD AUTO: 31.8 % (ref 34–46.6)
HGB BLD-MCNC: 10.6 G/DL (ref 12–15.9)
HGB UR QL STRIP.AUTO: NEGATIVE
HYALINE CASTS UR QL AUTO: ABNORMAL /LPF
IMM GRANULOCYTES # BLD AUTO: 0.08 10*3/MM3 (ref 0–0.05)
IMM GRANULOCYTES NFR BLD AUTO: 0.6 % (ref 0–0.5)
KETONES UR QL STRIP: NEGATIVE
LEUKOCYTE ESTERASE UR QL STRIP.AUTO: ABNORMAL
LYMPHOCYTES # BLD AUTO: 3.06 10*3/MM3 (ref 0.7–3.1)
LYMPHOCYTES NFR BLD AUTO: 23 % (ref 19.6–45.3)
MAGNESIUM SERPL-MCNC: 1.9 MG/DL (ref 1.6–2.4)
MCH RBC QN AUTO: 30.2 PG (ref 26.6–33)
MCHC RBC AUTO-ENTMCNC: 33.3 G/DL (ref 31.5–35.7)
MCV RBC AUTO: 90.6 FL (ref 79–97)
MONOCYTES # BLD AUTO: 1.03 10*3/MM3 (ref 0.1–0.9)
MONOCYTES NFR BLD AUTO: 7.8 % (ref 5–12)
NEUTROPHILS NFR BLD AUTO: 64.8 % (ref 42.7–76)
NEUTROPHILS NFR BLD AUTO: 8.61 10*3/MM3 (ref 1.7–7)
NITRITE UR QL STRIP: NEGATIVE
NRBC BLD AUTO-RTO: 0 /100 WBC (ref 0–0.2)
PH UR STRIP.AUTO: 5.5 [PH] (ref 5–8)
PHOSPHATE SERPL-MCNC: 2.8 MG/DL (ref 2.5–4.5)
PLATELET # BLD AUTO: 285 10*3/MM3 (ref 140–450)
PMV BLD AUTO: 9.9 FL (ref 6–12)
POTASSIUM SERPL-SCNC: 4.2 MMOL/L (ref 3.5–5.2)
PROT UR QL STRIP: ABNORMAL
PROT UR-MCNC: 86.7 MG/DL
PROT/CREAT UR: 0.4 MG/G{CREAT}
PTH-INTACT SERPL-MCNC: 112 PG/ML (ref 15–65)
RBC # BLD AUTO: 3.51 10*6/MM3 (ref 3.77–5.28)
RBC # UR: ABNORMAL /HPF
REF LAB TEST METHOD: ABNORMAL
SODIUM SERPL-SCNC: 135 MMOL/L (ref 136–145)
SP GR UR STRIP: 1.02 (ref 1–1.03)
SPECIMEN VOL 24H UR: 1700 ML
SQUAMOUS #/AREA URNS HPF: ABNORMAL /HPF
UROBILINOGEN UR QL STRIP: ABNORMAL
WBC # BLD AUTO: 13.28 10*3/MM3 (ref 3.4–10.8)
WBC UR QL AUTO: ABNORMAL /HPF

## 2021-07-26 PROCEDURE — 82570 ASSAY OF URINE CREATININE: CPT

## 2021-07-26 PROCEDURE — 83970 ASSAY OF PARATHORMONE: CPT

## 2021-07-26 PROCEDURE — 82306 VITAMIN D 25 HYDROXY: CPT

## 2021-07-26 PROCEDURE — 84156 ASSAY OF PROTEIN URINE: CPT

## 2021-07-26 PROCEDURE — 80069 RENAL FUNCTION PANEL: CPT

## 2021-07-26 PROCEDURE — 81001 URINALYSIS AUTO W/SCOPE: CPT

## 2021-07-26 PROCEDURE — 36415 COLL VENOUS BLD VENIPUNCTURE: CPT

## 2021-07-26 PROCEDURE — 82565 ASSAY OF CREATININE: CPT

## 2021-07-26 PROCEDURE — 82575 CREATININE CLEARANCE TEST: CPT

## 2021-07-26 PROCEDURE — 83735 ASSAY OF MAGNESIUM: CPT

## 2021-07-26 PROCEDURE — 85025 COMPLETE CBC W/AUTO DIFF WBC: CPT

## 2021-09-15 ENCOUNTER — LAB (OUTPATIENT)
Dept: LAB | Facility: HOSPITAL | Age: 81
End: 2021-09-15

## 2021-09-15 ENCOUNTER — TRANSCRIBE ORDERS (OUTPATIENT)
Dept: LAB | Facility: HOSPITAL | Age: 81
End: 2021-09-15

## 2021-09-15 DIAGNOSIS — E78.5 HYPERLIPIDEMIA, UNSPECIFIED HYPERLIPIDEMIA TYPE: Primary | ICD-10-CM

## 2021-09-15 DIAGNOSIS — Z79.899 ENCOUNTER FOR LONG-TERM (CURRENT) USE OF OTHER MEDICATIONS: ICD-10-CM

## 2021-09-15 DIAGNOSIS — E78.5 HYPERLIPIDEMIA, UNSPECIFIED HYPERLIPIDEMIA TYPE: ICD-10-CM

## 2021-09-15 LAB
CHOLEST SERPL-MCNC: 132 MG/DL (ref 0–200)
HDLC SERPL-MCNC: 41 MG/DL (ref 40–60)
LDLC SERPL CALC-MCNC: 56 MG/DL (ref 0–100)
LDLC/HDLC SERPL: 1.18 {RATIO}
TRIGL SERPL-MCNC: 213 MG/DL (ref 0–150)
VLDLC SERPL-MCNC: 35 MG/DL (ref 5–40)

## 2021-09-15 PROCEDURE — 36415 COLL VENOUS BLD VENIPUNCTURE: CPT

## 2021-09-15 PROCEDURE — 84443 ASSAY THYROID STIM HORMONE: CPT

## 2021-09-15 PROCEDURE — 84439 ASSAY OF FREE THYROXINE: CPT

## 2021-09-15 PROCEDURE — 80053 COMPREHEN METABOLIC PANEL: CPT

## 2021-09-15 PROCEDURE — 80061 LIPID PANEL: CPT

## 2021-09-16 LAB
ALBUMIN SERPL-MCNC: 4 G/DL (ref 3.5–5.2)
ALBUMIN/GLOB SERPL: 1.1 G/DL
ALP SERPL-CCNC: 62 U/L (ref 39–117)
ALT SERPL W P-5'-P-CCNC: 9 U/L (ref 1–33)
ANION GAP SERPL CALCULATED.3IONS-SCNC: 9.5 MMOL/L (ref 5–15)
AST SERPL-CCNC: 13 U/L (ref 1–32)
BILIRUB SERPL-MCNC: 0.2 MG/DL (ref 0–1.2)
BUN SERPL-MCNC: 47 MG/DL (ref 8–23)
BUN/CREAT SERPL: 18 (ref 7–25)
CALCIUM SPEC-SCNC: 9.1 MG/DL (ref 8.6–10.5)
CHLORIDE SERPL-SCNC: 95 MMOL/L (ref 98–107)
CO2 SERPL-SCNC: 27.5 MMOL/L (ref 22–29)
CREAT SERPL-MCNC: 2.61 MG/DL (ref 0.57–1)
GFR SERPL CREATININE-BSD FRML MDRD: 18 ML/MIN/1.73
GLOBULIN UR ELPH-MCNC: 3.5 GM/DL
GLUCOSE SERPL-MCNC: 173 MG/DL (ref 65–99)
POTASSIUM SERPL-SCNC: 4.3 MMOL/L (ref 3.5–5.2)
PROT SERPL-MCNC: 7.5 G/DL (ref 6–8.5)
SODIUM SERPL-SCNC: 132 MMOL/L (ref 136–145)
T4 FREE SERPL-MCNC: 0.89 NG/DL (ref 0.93–1.7)
TSH SERPL DL<=0.05 MIU/L-ACNC: 2.07 UIU/ML (ref 0.27–4.2)

## 2021-09-21 PROBLEM — E78.00 HIGH CHOLESTEROL: Status: ACTIVE | Noted: 2021-09-21

## 2021-09-21 PROBLEM — E03.8 OTHER SPECIFIED HYPOTHYROIDISM: Chronic | Status: ACTIVE | Noted: 2021-09-21

## 2021-09-21 PROBLEM — N18.4 CKD (CHRONIC KIDNEY DISEASE) STAGE 4, GFR 15-29 ML/MIN: Chronic | Status: ACTIVE | Noted: 2021-09-21

## 2021-09-21 PROBLEM — I25.10 CORONARY ARTERY DISEASE: Status: ACTIVE | Noted: 2021-09-21

## 2021-09-21 PROBLEM — I10 HYPERTENSION, ESSENTIAL: Chronic | Status: ACTIVE | Noted: 2021-09-21

## 2021-09-21 PROBLEM — I10 HYPERTENSION, ESSENTIAL: Status: ACTIVE | Noted: 2021-09-21

## 2021-10-27 ENCOUNTER — LAB (OUTPATIENT)
Dept: LAB | Facility: HOSPITAL | Age: 81
End: 2021-10-27

## 2021-10-27 ENCOUNTER — TRANSCRIBE ORDERS (OUTPATIENT)
Dept: LAB | Facility: HOSPITAL | Age: 81
End: 2021-10-27

## 2021-10-27 DIAGNOSIS — N18.4 CHRONIC KIDNEY DISEASE, STAGE IV (SEVERE) (HCC): Primary | ICD-10-CM

## 2021-10-27 DIAGNOSIS — N18.4 CHRONIC KIDNEY DISEASE, STAGE IV (SEVERE) (HCC): ICD-10-CM

## 2021-10-27 LAB
ALBUMIN SERPL-MCNC: 4.1 G/DL (ref 3.5–5.2)
ANION GAP SERPL CALCULATED.3IONS-SCNC: 12 MMOL/L (ref 5–15)
BASOPHILS # BLD AUTO: 0.09 10*3/MM3 (ref 0–0.2)
BASOPHILS NFR BLD AUTO: 0.9 % (ref 0–1.5)
BUN SERPL-MCNC: 47 MG/DL (ref 8–23)
BUN/CREAT SERPL: 17 (ref 7–25)
CALCIUM SPEC-SCNC: 9.5 MG/DL (ref 8.6–10.5)
CHLORIDE SERPL-SCNC: 98 MMOL/L (ref 98–107)
CO2 SERPL-SCNC: 25 MMOL/L (ref 22–29)
CREAT SERPL-MCNC: 2.76 MG/DL (ref 0.57–1)
DEPRECATED RDW RBC AUTO: 45.8 FL (ref 37–54)
EOSINOPHIL # BLD AUTO: 0.52 10*3/MM3 (ref 0–0.4)
EOSINOPHIL NFR BLD AUTO: 5.3 % (ref 0.3–6.2)
ERYTHROCYTE [DISTWIDTH] IN BLOOD BY AUTOMATED COUNT: 13.9 % (ref 12.3–15.4)
GFR SERPL CREATININE-BSD FRML MDRD: 17 ML/MIN/1.73
GLUCOSE SERPL-MCNC: 179 MG/DL (ref 65–99)
HCT VFR BLD AUTO: 33.7 % (ref 34–46.6)
HGB BLD-MCNC: 10.9 G/DL (ref 12–15.9)
IMM GRANULOCYTES # BLD AUTO: 0.03 10*3/MM3 (ref 0–0.05)
IMM GRANULOCYTES NFR BLD AUTO: 0.3 % (ref 0–0.5)
LYMPHOCYTES # BLD AUTO: 2.64 10*3/MM3 (ref 0.7–3.1)
LYMPHOCYTES NFR BLD AUTO: 27 % (ref 19.6–45.3)
MCH RBC QN AUTO: 29.1 PG (ref 26.6–33)
MCHC RBC AUTO-ENTMCNC: 32.3 G/DL (ref 31.5–35.7)
MCV RBC AUTO: 89.9 FL (ref 79–97)
MONOCYTES # BLD AUTO: 0.73 10*3/MM3 (ref 0.1–0.9)
MONOCYTES NFR BLD AUTO: 7.5 % (ref 5–12)
NEUTROPHILS NFR BLD AUTO: 5.75 10*3/MM3 (ref 1.7–7)
NEUTROPHILS NFR BLD AUTO: 59 % (ref 42.7–76)
NRBC BLD AUTO-RTO: 0 /100 WBC (ref 0–0.2)
PHOSPHATE SERPL-MCNC: 3.9 MG/DL (ref 2.5–4.5)
PLATELET # BLD AUTO: 233 10*3/MM3 (ref 140–450)
PMV BLD AUTO: 10.2 FL (ref 6–12)
POTASSIUM SERPL-SCNC: 4.5 MMOL/L (ref 3.5–5.2)
RBC # BLD AUTO: 3.75 10*6/MM3 (ref 3.77–5.28)
SODIUM SERPL-SCNC: 135 MMOL/L (ref 136–145)
WBC # BLD AUTO: 9.76 10*3/MM3 (ref 3.4–10.8)

## 2021-10-27 PROCEDURE — 85025 COMPLETE CBC W/AUTO DIFF WBC: CPT

## 2021-10-27 PROCEDURE — 80069 RENAL FUNCTION PANEL: CPT

## 2021-10-27 PROCEDURE — 36415 COLL VENOUS BLD VENIPUNCTURE: CPT

## 2021-12-16 ENCOUNTER — TELEPHONE (OUTPATIENT)
Dept: CARDIOLOGY | Facility: CLINIC | Age: 81
End: 2021-12-16

## 2021-12-16 NOTE — TELEPHONE ENCOUNTER
We got a refill request for this pt for Coreg 12.5mg 1 and 1/2 tab BID. Her chart says Coreg 12.5mg 1 tab BID, I cant find anywhere in [facility] or [facility] where this medication got increased or changed. The pt states shes been taking the Coreg this way since she saw us last in March.     How do you want me to refill this prescription, she is out of Coreg?

## 2021-12-21 RX ORDER — CARVEDILOL 12.5 MG/1
18.75 TABLET ORAL 2 TIMES DAILY
Qty: 135 TABLET | Refills: 0 | Status: SHIPPED | OUTPATIENT
Start: 2021-12-21 | End: 2022-02-24 | Stop reason: SDUPTHER

## 2021-12-21 RX ORDER — CARVEDILOL 12.5 MG/1
1.5 TABLET ORAL 2 TIMES DAILY
COMMUNITY
End: 2021-12-21 | Stop reason: SDUPTHER

## 2022-01-28 ENCOUNTER — LAB (OUTPATIENT)
Dept: LAB | Facility: HOSPITAL | Age: 82
End: 2022-01-28

## 2022-01-28 ENCOUNTER — TRANSCRIBE ORDERS (OUTPATIENT)
Dept: LAB | Facility: HOSPITAL | Age: 82
End: 2022-01-28

## 2022-01-28 DIAGNOSIS — N18.4 STAGE 4 CHRONIC KIDNEY DISEASE: Primary | ICD-10-CM

## 2022-01-28 DIAGNOSIS — N18.4 STAGE 4 CHRONIC KIDNEY DISEASE: ICD-10-CM

## 2022-01-28 LAB
25(OH)D3 SERPL-MCNC: 59.1 NG/ML (ref 30–100)
ALBUMIN SERPL-MCNC: 3.9 G/DL (ref 3.5–5.2)
ANION GAP SERPL CALCULATED.3IONS-SCNC: 13 MMOL/L (ref 5–15)
BACTERIA UR QL AUTO: ABNORMAL /HPF
BASOPHILS # BLD AUTO: 0.06 10*3/MM3 (ref 0–0.2)
BASOPHILS NFR BLD AUTO: 0.7 % (ref 0–1.5)
BILIRUB UR QL STRIP: NEGATIVE
BUN SERPL-MCNC: 47 MG/DL (ref 8–23)
BUN/CREAT SERPL: 19.7 (ref 7–25)
CALCIUM SPEC-SCNC: 9.3 MG/DL (ref 8.6–10.5)
CHLORIDE SERPL-SCNC: 103 MMOL/L (ref 98–107)
CLARITY UR: CLEAR
CO2 SERPL-SCNC: 24 MMOL/L (ref 22–29)
COLOR UR: YELLOW
CREAT SERPL-MCNC: 2.39 MG/DL (ref 0.57–1)
CREAT UR-MCNC: 79 MG/DL
DEPRECATED RDW RBC AUTO: 46.1 FL (ref 37–54)
EOSINOPHIL # BLD AUTO: 0.63 10*3/MM3 (ref 0–0.4)
EOSINOPHIL NFR BLD AUTO: 6.8 % (ref 0.3–6.2)
ERYTHROCYTE [DISTWIDTH] IN BLOOD BY AUTOMATED COUNT: 13.6 % (ref 12.3–15.4)
GFR SERPL CREATININE-BSD FRML MDRD: 20 ML/MIN/1.73
GLUCOSE SERPL-MCNC: 243 MG/DL (ref 65–99)
GLUCOSE UR STRIP-MCNC: ABNORMAL MG/DL
HCT VFR BLD AUTO: 32.8 % (ref 34–46.6)
HGB BLD-MCNC: 10.3 G/DL (ref 12–15.9)
HGB UR QL STRIP.AUTO: ABNORMAL
HYALINE CASTS UR QL AUTO: ABNORMAL /LPF
IMM GRANULOCYTES # BLD AUTO: 0.03 10*3/MM3 (ref 0–0.05)
IMM GRANULOCYTES NFR BLD AUTO: 0.3 % (ref 0–0.5)
KETONES UR QL STRIP: NEGATIVE
LEUKOCYTE ESTERASE UR QL STRIP.AUTO: ABNORMAL
LYMPHOCYTES # BLD AUTO: 3.12 10*3/MM3 (ref 0.7–3.1)
LYMPHOCYTES NFR BLD AUTO: 33.8 % (ref 19.6–45.3)
MCH RBC QN AUTO: 28.9 PG (ref 26.6–33)
MCHC RBC AUTO-ENTMCNC: 31.4 G/DL (ref 31.5–35.7)
MCV RBC AUTO: 92.1 FL (ref 79–97)
MONOCYTES # BLD AUTO: 0.67 10*3/MM3 (ref 0.1–0.9)
MONOCYTES NFR BLD AUTO: 7.3 % (ref 5–12)
NEUTROPHILS NFR BLD AUTO: 4.71 10*3/MM3 (ref 1.7–7)
NEUTROPHILS NFR BLD AUTO: 51.1 % (ref 42.7–76)
NITRITE UR QL STRIP: POSITIVE
NRBC BLD AUTO-RTO: 0 /100 WBC (ref 0–0.2)
PH UR STRIP.AUTO: 6.5 [PH] (ref 5–8)
PHOSPHATE SERPL-MCNC: 4.4 MG/DL (ref 2.5–4.5)
PLATELET # BLD AUTO: 202 10*3/MM3 (ref 140–450)
PMV BLD AUTO: 10.7 FL (ref 6–12)
POTASSIUM SERPL-SCNC: 4.5 MMOL/L (ref 3.5–5.2)
PROT ?TM UR-MCNC: 70.7 MG/DL
PROT UR QL STRIP: ABNORMAL
PROT/CREAT UR: 0.9 MG/G{CREAT}
PTH-INTACT SERPL-MCNC: 155 PG/ML (ref 15–65)
RBC # BLD AUTO: 3.56 10*6/MM3 (ref 3.77–5.28)
RBC # UR STRIP: ABNORMAL /HPF
REF LAB TEST METHOD: ABNORMAL
SODIUM SERPL-SCNC: 140 MMOL/L (ref 136–145)
SP GR UR STRIP: 1.02 (ref 1–1.03)
SQUAMOUS #/AREA URNS HPF: ABNORMAL /HPF
UROBILINOGEN UR QL STRIP: ABNORMAL
WBC # UR STRIP: ABNORMAL /HPF
WBC NRBC COR # BLD: 9.22 10*3/MM3 (ref 3.4–10.8)

## 2022-01-28 PROCEDURE — 83970 ASSAY OF PARATHORMONE: CPT

## 2022-01-28 PROCEDURE — 82306 VITAMIN D 25 HYDROXY: CPT

## 2022-01-28 PROCEDURE — 85025 COMPLETE CBC W/AUTO DIFF WBC: CPT

## 2022-01-28 PROCEDURE — 84156 ASSAY OF PROTEIN URINE: CPT

## 2022-01-28 PROCEDURE — 81001 URINALYSIS AUTO W/SCOPE: CPT

## 2022-01-28 PROCEDURE — 36415 COLL VENOUS BLD VENIPUNCTURE: CPT

## 2022-01-28 PROCEDURE — 82570 ASSAY OF URINE CREATININE: CPT

## 2022-01-28 PROCEDURE — 80069 RENAL FUNCTION PANEL: CPT

## 2022-02-10 RX ORDER — ROSUVASTATIN CALCIUM 40 MG/1
TABLET, COATED ORAL
Qty: 30 TABLET | Refills: 0 | Status: SHIPPED | OUTPATIENT
Start: 2022-02-10 | End: 2022-05-16

## 2022-02-24 ENCOUNTER — OFFICE VISIT (OUTPATIENT)
Dept: CARDIOLOGY | Facility: CLINIC | Age: 82
End: 2022-02-24

## 2022-02-24 ENCOUNTER — TELEPHONE (OUTPATIENT)
Dept: CARDIOLOGY | Facility: CLINIC | Age: 82
End: 2022-02-24

## 2022-02-24 VITALS
BODY MASS INDEX: 31.08 KG/M2 | HEART RATE: 82 BPM | WEIGHT: 198 LBS | SYSTOLIC BLOOD PRESSURE: 141 MMHG | DIASTOLIC BLOOD PRESSURE: 62 MMHG | HEIGHT: 67 IN

## 2022-02-24 DIAGNOSIS — I10 HYPERTENSION, ESSENTIAL: Chronic | ICD-10-CM

## 2022-02-24 DIAGNOSIS — I25.10 CORONARY ARTERY DISEASE INVOLVING NATIVE HEART WITHOUT ANGINA PECTORIS, UNSPECIFIED VESSEL OR LESION TYPE: Primary | Chronic | ICD-10-CM

## 2022-02-24 DIAGNOSIS — N18.4 CKD (CHRONIC KIDNEY DISEASE) STAGE 4, GFR 15-29 ML/MIN: Chronic | ICD-10-CM

## 2022-02-24 DIAGNOSIS — E78.00 HIGH CHOLESTEROL: ICD-10-CM

## 2022-02-24 PROCEDURE — 99214 OFFICE O/P EST MOD 30 MIN: CPT | Performed by: INTERNAL MEDICINE

## 2022-02-24 PROCEDURE — 93000 ELECTROCARDIOGRAM COMPLETE: CPT | Performed by: INTERNAL MEDICINE

## 2022-02-24 RX ORDER — FUROSEMIDE 40 MG/1
40 TABLET ORAL DAILY
COMMUNITY
End: 2022-05-27

## 2022-02-24 RX ORDER — CARVEDILOL 25 MG/1
25 TABLET ORAL 2 TIMES DAILY
Qty: 180 TABLET | Refills: 3 | Status: SHIPPED | OUTPATIENT
Start: 2022-02-24

## 2022-02-24 RX ORDER — ALBUTEROL SULFATE 90 UG/1
2 AEROSOL, METERED RESPIRATORY (INHALATION) EVERY 4 HOURS PRN
COMMUNITY

## 2022-02-24 RX ORDER — ASPIRIN 81 MG/1
81 TABLET ORAL DAILY
COMMUNITY

## 2022-02-24 RX ORDER — CALCITRIOL 0.25 UG/1
0.25 CAPSULE, LIQUID FILLED ORAL DAILY
COMMUNITY

## 2022-02-24 RX ORDER — CETIRIZINE HYDROCHLORIDE 10 MG/1
10 TABLET ORAL DAILY
COMMUNITY

## 2022-02-24 RX ORDER — LIOTHYRONINE SODIUM 5 UG/1
5 TABLET ORAL
COMMUNITY

## 2022-02-24 RX ORDER — INSULIN GLARGINE 100 [IU]/ML
INJECTION, SOLUTION SUBCUTANEOUS DAILY
COMMUNITY

## 2022-02-24 RX ORDER — FEBUXOSTAT 40 MG/1
40 TABLET, FILM COATED ORAL DAILY
COMMUNITY

## 2022-02-24 RX ORDER — DULAGLUTIDE 3 MG/.5ML
INJECTION, SOLUTION SUBCUTANEOUS
COMMUNITY

## 2022-02-24 RX ORDER — KRILL/OM-3/DHA/EPA/PHOSPHO/AST 500MG-86MG
CAPSULE ORAL
COMMUNITY

## 2022-02-24 RX ORDER — NITROGLYCERIN 40 MG/1
1 PATCH TRANSDERMAL DAILY
COMMUNITY
End: 2022-02-24 | Stop reason: SDDI

## 2022-02-24 RX ORDER — LEVOTHYROXINE SODIUM 0.05 MG/1
50 TABLET ORAL DAILY
COMMUNITY

## 2022-02-24 RX ORDER — ACETAMINOPHEN 500 MG
500 TABLET ORAL EVERY 6 HOURS PRN
COMMUNITY

## 2022-02-24 RX ORDER — FOLIC ACID 1 MG/1
1 TABLET ORAL DAILY
COMMUNITY

## 2022-02-24 RX ORDER — NITROGLYCERIN 0.4 MG/1
0.4 TABLET SUBLINGUAL
COMMUNITY

## 2022-02-24 RX ORDER — GLIMEPIRIDE 2 MG/1
2 TABLET ORAL
COMMUNITY

## 2022-02-24 NOTE — ASSESSMENT & PLAN NOTE
Her coronary disease is stable.  She has not had any chest in many months and she stopped using her nitro patches by herself.  She will let us know if she has recurrent angina.

## 2022-02-24 NOTE — PROGRESS NOTES
Office Visit    Chief Complaint  Coronary Artery Disease, Hypertension, and Hyperlipidemia    Subjective            Mercedes Felipe presents to [facility]  Marisa is a 81 years old female with chronic lung disease hypertension diabetes hyperlipidemia coronary artery disease was doing well from a cardiac standpoint.  She has not had any chest pain in the last several months and she stopped using her nitro patches.  Her shortness of breath secondary to her chronic lung disease has got a little worse and she ends up using oxygen almost all the time.  She denies palpitations dizziness syncope      Past Medical History:   Diagnosis Date   • Ankle pain    • Arthritis    • Asthma    • CAD (coronary artery disease)    • CKD (chronic kidney disease) stage 4, GFR 15-29 ml/min ([facility]) 9/21/2021   • COPD (chronic obstructive pulmonary disease) ([facility])    • Coronary artery disease 1/1/2007    Coronary artery disease with 100% occluded right coronary artery with collaterals demonstrated on cardiac catheterization in 2007;   • Diabetes ([facility]) 01/07/2019   • Essential hypertension    • Fracture    • Gout    • High cholesterol    • History of emphysema    • Hypertension, essential 9/21/2021   • Hyperthyroidism    • Myocardial infarction ([facility])    • Numbness in feet    • Other specified hypothyroidism 9/21/2021   • Rectal bleeding 02/20/2014   • Type 1 diabetes mellitus ([facility])        Allergies   Allergen Reactions   • Penicillins Unknown - Low Severity   • Sulfa Antibiotics Unknown - Low Severity        Past Surgical History:   Procedure Laterality Date   • BACK SURGERY     • BREAST SURGERY     • DILATATION AND CURETTAGE     • TOTAL THYROIDECTOMY     • TUBAL ABDOMINAL LIGATION          Social History     Tobacco Use   • Smoking status: Former Smoker   • Smokeless tobacco: Never Used   Substance Use Topics   • Alcohol use: Never   • Drug use: Not on file       Family History   Problem Relation Age of Onset   •  Heart disease Mother    • Heart disease Father    • Diabetes Sister    • Diabetes Brother    • Stroke Other    • Other Other         Spine Problems    • Heart attack Other    • Breast cancer Daughter         Prior to Admission medications    Medication Sig Start Date End Date Taking? Authorizing Provider   acetaminophen (TYLENOL) 500 MG tablet Take 500 mg by mouth Every 6 (Six) Hours As Needed for Mild Pain .   Yes Tiana Huerta MD   albuterol sulfate  (90 Base) MCG/ACT inhaler Inhale 2 puffs Every 4 (Four) Hours As Needed for Wheezing.   Yes Tiana Huerta MD   aspirin 81 MG EC tablet Take 81 mg by mouth Daily.   Yes ProviderTiana MD   calcitriol (ROCALTROL) 0.25 MCG capsule Take 0.25 mcg by mouth Daily.   Yes Tiana Huerta MD   carvedilol (COREG) 12.5 MG tablet Take 1.5 tablets by mouth 2 (Two) Times a Day. 12/21/21  Yes Tramaine Ramsey MD   Casanthranol-Docusate Sodium (LAXATIVE PLUS STOOL SOFTENER PO) Take  by mouth.   Yes ProviderTiana MD   cetirizine (zyrTEC) 10 MG tablet Take 10 mg by mouth Daily.   Yes Provider, MD Tiana   Dulaglutide (Trulicity) 3 MG/0.5ML solution pen-injector Inject  under the skin into the appropriate area as directed.   Yes Tiana Huerta MD   febuxostat (ULORIC) 40 MG tablet Take 40 mg by mouth Daily.   Yes ProviderTiana MD   Fluticasone-Umeclidin-Vilant (TRELEGY) 100-62.5-25 MCG/INH inhaler Inhale 1 puff Daily.   Yes ProviderTiana MD   folic acid (FOLVITE) 1 MG tablet Take 1 mg by mouth Daily.   Yes ProviderTiana MD   furosemide (LASIX) 40 MG tablet Take 40 mg by mouth Daily.   Yes Tiana Huerta MD   glimepiride (AMARYL) 2 MG tablet Take 2 mg by mouth Every Morning Before Breakfast.   Yes Tiana Huerta MD   insulin glargine (Lantus) 100 UNIT/ML injection Inject  under the skin into the appropriate area as directed Daily.   Yes Tiana Huerta MD   Krill Oil 500 MG capsule Take  by  "mouth.   Yes Tiana Huerta MD   levothyroxine (SYNTHROID, LEVOTHROID) 50 MCG tablet Take 50 mcg by mouth Daily.   Yes Tiana Huerta MD   liothyronine (CYTOMEL) 5 MCG tablet 5 mcg. Take 2 tablet in the AM and 1 tablet q pm   Yes Tiana Huerta MD   magnesium oxide (MAGOX) 400 (241.3 Mg) MG tablet tablet Take 400 mg by mouth Daily.   Yes Tiana Huerta MD   nitroglycerin (NITRODUR) 0.2 MG/HR patch Place 1 patch on the skin as directed by provider Daily.   Yes Tiana Huerta MD   nitroglycerin (NITROSTAT) 0.4 MG SL tablet Place 0.4 mg under the tongue Every 5 (Five) Minutes As Needed for Chest Pain. Take no more than 3 doses in 15 minutes.   Yes Tiana Huerta MD   rosuvastatin (CRESTOR) 40 MG tablet TAKE ONE TABLET BY MOUTH EVERY DAY 2/10/22  Yes Jillian Holloway June, APRN   vitamin D3 (Vitamin D) 125 MCG (5000 UT) capsule capsule Take 5,000 Units by mouth Daily.   Yes Tiana Huerta MD        Review of Systems   Constitutional: Negative for fatigue.   Respiratory: Positive for shortness of breath. Negative for cough.    Cardiovascular: Negative for chest pain, palpitations and leg swelling.   Neurological: Negative for dizziness.        Objective     /62   Pulse 82   Ht 170.2 cm (67\")   Wt 89.8 kg (198 lb)   BMI 31.01 kg/m²       Physical Exam  Constitutional:       General: She is awake.      Appearance: Normal appearance.   Neck:      Thyroid: No thyromegaly.      Vascular: No carotid bruit or JVD.   Cardiovascular:      Rate and Rhythm: Normal rate and regular rhythm.      Chest Wall: PMI is not displaced.      Pulses: Normal pulses.      Heart sounds: Normal heart sounds, S1 normal and S2 normal. No murmur heard.  No friction rub. No gallop. No S3 or S4 sounds.    Pulmonary:      Effort: Pulmonary effort is normal.      Breath sounds: Normal breath sounds and air entry. No wheezing, rhonchi or rales.   Abdominal:      General: Bowel sounds are normal.      " Palpations: Abdomen is soft. There is no mass.      Tenderness: There is no abdominal tenderness.   Musculoskeletal:      Cervical back: Neck supple.      Right lower leg: No edema.      Left lower leg: No edema.   Neurological:      Mental Status: She is alert and oriented to person, place, and time.   Psychiatric:         Mood and Affect: Mood normal.         Behavior: Behavior is cooperative.           Result Review :                    ECG 12 Lead    Date/Time: 2/24/2022 1:28 PM  Performed by: Tramaine Ramsey MD  Authorized by: Tramaine Ramsey MD   Comments: Sinus rhythm occasional APC.  No previous EKG available for comparison           No results found for: PROBNP, BNP  CMP    CMP 9/15/21 10/27/21 1/28/22   Glucose 173 (A) 179 (A) 243 (A)   BUN 47 (A) 47 (A) 47 (A)   Creatinine 2.61 (A) 2.76 (A) 2.39 (A)   eGFR Non  Am 18 (A) 17 (A) 20 (A)   Sodium 132 (A) 135 (A) 140   Potassium 4.3 4.5 4.5   Chloride 95 (A) 98 103   Calcium 9.1 9.5 9.3   Albumin 4.00 4.10 3.90   Total Bilirubin 0.2     Alkaline Phosphatase 62     AST (SGOT) 13     ALT (SGPT) 9     (A) Abnormal value            CBC w/diff    CBC w/Diff 7/26/21 10/27/21 1/28/22   WBC 13.28 (A) 9.76 9.22   RBC 3.51 (A) 3.75 (A) 3.56 (A)   Hemoglobin 10.6 (A) 10.9 (A) 10.3 (A)   Hematocrit 31.8 (A) 33.7 (A) 32.8 (A)   MCV 90.6 89.9 92.1   MCH 30.2 29.1 28.9   MCHC 33.3 32.3 31.4 (A)   RDW 12.7 13.9 13.6   Platelets 285 233 202   Neutrophil Rel % 64.8 59.0 51.1   Immature Granulocyte Rel % 0.6 (A) 0.3 0.3   Lymphocyte Rel % 23.0 27.0 33.8   Monocyte Rel % 7.8 7.5 7.3   Eosinophil Rel % 3.0 5.3 6.8 (A)   Basophil Rel % 0.8 0.9 0.7   (A) Abnormal value             Lipid Panel    Lipid Panel 5/26/21 9/15/21   Total Cholesterol  132   Total Cholesterol 126    Triglycerides 175 (A) 213 (A)   HDL Cholesterol 35 (A) 41   VLDL Cholesterol 35 35   LDL Cholesterol  56 (A) 56   LDL/HDL Ratio  1.18   (A) Abnormal value       Comments are available for some flowsheets  but are not being displayed.            Lab Results   Component Value Date    TSH 2.070 09/15/2021      Lab Results   Component Value Date    FREET4 0.89 (L) 09/15/2021      No results found for: DDIMERQUANT  Magnesium   Date Value Ref Range Status   07/26/2021 1.9 1.6 - 2.4 mg/dL Final      No results found for: DIGOXIN      No recent lipid profile available.  Patient will get labs done in the next few days.         Assessment and Plan        Diagnoses and all orders for this visit:    1. Coronary artery disease involving native heart without angina pectoris, unspecified vessel or lesion type (Primary)  Assessment & Plan:  Her coronary disease is stable.  She has not had any chest in many months and she stopped using her nitro patches by herself.  She will let us know if she has recurrent angina.    Orders:  -     Lipid Panel; Future  -     Comprehensive Metabolic Panel; Future  -     Magnesium; Future  -     Comprehensive Metabolic Panel; Future  -     Lipid Panel; Future  -     Magnesium; Future    2. Hypertension, essential  Assessment & Plan:  Her blood pressure runs a little bit high 135 to 145mmHg on the systolic side.  From a secondary prevention standpoint I would like it a little bit lower so I am going to increase her carvedilol to 25 mg twice daily and have her do a blood pressure log starting 10 days from now    Orders:  -     Lipid Panel; Future  -     Comprehensive Metabolic Panel; Future  -     Magnesium; Future  -     Comprehensive Metabolic Panel; Future  -     Lipid Panel; Future  -     Magnesium; Future    3. High cholesterol  Assessment & Plan:  She will get a lipid profile in the next few days and will decide on the adequacy of lipid-lowering therapy.  Her goal is to keep her LDL way below 70    Orders:  -     Lipid Panel; Future  -     Comprehensive Metabolic Panel; Future  -     Magnesium; Future  -     Comprehensive Metabolic Panel; Future  -     Lipid Panel; Future  -     Magnesium;  Future    4. CKD (chronic kidney disease) stage 4, GFR 15-29 ml/min (HCC)  -     Lipid Panel; Future  -     Comprehensive Metabolic Panel; Future  -     Magnesium; Future  -     Comprehensive Metabolic Panel; Future  -     Lipid Panel; Future  -     Magnesium; Future    Other orders  -     carvedilol (COREG) 25 MG tablet; Take 1 tablet by mouth 2 (Two) Times a Day.  Dispense: 180 tablet; Refill: 3  -     ECG 12 Lead          Follow Up     Return in about 6 months (around 8/24/2022) for next ov with June.    Patient was given instructions and counseling regarding her condition or for health maintenance advice. Please see specific information pulled into the AVS if appropriate.     Tramaine Ramsey MD  02/24/22 11:42 EST

## 2022-02-24 NOTE — ASSESSMENT & PLAN NOTE
Her blood pressure runs a little bit high 135 to 145mmHg on the systolic side.  From a secondary prevention standpoint I would like it a little bit lower so I am going to increase her carvedilol to 25 mg twice daily and have her do a blood pressure log starting 10 days from now

## 2022-02-24 NOTE — ASSESSMENT & PLAN NOTE
She will get a lipid profile in the next few days and will decide on the adequacy of lipid-lowering therapy.  Her goal is to keep her LDL way below 70

## 2022-03-08 ENCOUNTER — TRANSCRIBE ORDERS (OUTPATIENT)
Dept: LAB | Facility: HOSPITAL | Age: 82
End: 2022-03-08

## 2022-03-08 ENCOUNTER — LAB (OUTPATIENT)
Dept: LAB | Facility: HOSPITAL | Age: 82
End: 2022-03-08

## 2022-03-08 DIAGNOSIS — D64.9 ANEMIA, UNSPECIFIED TYPE: ICD-10-CM

## 2022-03-08 DIAGNOSIS — N18.4 CKD (CHRONIC KIDNEY DISEASE) STAGE 4, GFR 15-29 ML/MIN: Chronic | ICD-10-CM

## 2022-03-08 DIAGNOSIS — N18.4 CHRONIC KIDNEY DISEASE, STAGE IV (SEVERE): Primary | ICD-10-CM

## 2022-03-08 DIAGNOSIS — I25.10 CORONARY ARTERY DISEASE INVOLVING NATIVE HEART WITHOUT ANGINA PECTORIS, UNSPECIFIED VESSEL OR LESION TYPE: Chronic | ICD-10-CM

## 2022-03-08 DIAGNOSIS — I10 HYPERTENSION, ESSENTIAL: Chronic | ICD-10-CM

## 2022-03-08 DIAGNOSIS — N18.4 CHRONIC KIDNEY DISEASE, STAGE IV (SEVERE): ICD-10-CM

## 2022-03-08 DIAGNOSIS — E78.00 HIGH CHOLESTEROL: ICD-10-CM

## 2022-03-08 LAB
ALBUMIN SERPL-MCNC: 3.9 G/DL (ref 3.5–5.2)
ALBUMIN/GLOB SERPL: 1.1 G/DL
ALP SERPL-CCNC: 61 U/L (ref 39–117)
ALT SERPL W P-5'-P-CCNC: 12 U/L (ref 1–33)
ANION GAP SERPL CALCULATED.3IONS-SCNC: 14.1 MMOL/L (ref 5–15)
AST SERPL-CCNC: 14 U/L (ref 1–32)
BASOPHILS # BLD AUTO: 0.1 10*3/MM3 (ref 0–0.2)
BASOPHILS NFR BLD AUTO: 1.1 % (ref 0–1.5)
BILIRUB SERPL-MCNC: 0.3 MG/DL (ref 0–1.2)
BUN SERPL-MCNC: 36 MG/DL (ref 8–23)
BUN/CREAT SERPL: 11.8 (ref 7–25)
CALCIUM SPEC-SCNC: 9.1 MG/DL (ref 8.6–10.5)
CHLORIDE SERPL-SCNC: 101 MMOL/L (ref 98–107)
CHOLEST SERPL-MCNC: 124 MG/DL (ref 0–200)
CO2 SERPL-SCNC: 21.9 MMOL/L (ref 22–29)
CREAT SERPL-MCNC: 3.04 MG/DL (ref 0.57–1)
DEPRECATED RDW RBC AUTO: 45.3 FL (ref 37–54)
EGFRCR SERPLBLD CKD-EPI 2021: 15.1 ML/MIN/1.73
EOSINOPHIL # BLD AUTO: 0.57 10*3/MM3 (ref 0–0.4)
EOSINOPHIL NFR BLD AUTO: 6.1 % (ref 0.3–6.2)
ERYTHROCYTE [DISTWIDTH] IN BLOOD BY AUTOMATED COUNT: 13.2 % (ref 12.3–15.4)
GLOBULIN UR ELPH-MCNC: 3.4 GM/DL
GLUCOSE SERPL-MCNC: 201 MG/DL (ref 65–99)
HCT VFR BLD AUTO: 33.1 % (ref 34–46.6)
HDLC SERPL-MCNC: 43 MG/DL (ref 40–60)
HGB BLD-MCNC: 10.7 G/DL (ref 12–15.9)
IMM GRANULOCYTES # BLD AUTO: 0.03 10*3/MM3 (ref 0–0.05)
IMM GRANULOCYTES NFR BLD AUTO: 0.3 % (ref 0–0.5)
IRON 24H UR-MRATE: 68 MCG/DL (ref 37–145)
IRON SATN MFR SERPL: 20 % (ref 20–50)
LDLC SERPL CALC-MCNC: 53 MG/DL (ref 0–100)
LDLC/HDLC SERPL: 1.12 {RATIO}
LYMPHOCYTES # BLD AUTO: 2.42 10*3/MM3 (ref 0.7–3.1)
LYMPHOCYTES NFR BLD AUTO: 26 % (ref 19.6–45.3)
MAGNESIUM SERPL-MCNC: 2 MG/DL (ref 1.6–2.4)
MCH RBC QN AUTO: 30.1 PG (ref 26.6–33)
MCHC RBC AUTO-ENTMCNC: 32.3 G/DL (ref 31.5–35.7)
MCV RBC AUTO: 93.2 FL (ref 79–97)
MONOCYTES # BLD AUTO: 0.84 10*3/MM3 (ref 0.1–0.9)
MONOCYTES NFR BLD AUTO: 9 % (ref 5–12)
NEUTROPHILS NFR BLD AUTO: 5.34 10*3/MM3 (ref 1.7–7)
NEUTROPHILS NFR BLD AUTO: 57.5 % (ref 42.7–76)
NRBC BLD AUTO-RTO: 0 /100 WBC (ref 0–0.2)
PHOSPHATE SERPL-MCNC: 4.5 MG/DL (ref 2.5–4.5)
PLATELET # BLD AUTO: 192 10*3/MM3 (ref 140–450)
PMV BLD AUTO: 10.5 FL (ref 6–12)
POTASSIUM SERPL-SCNC: 4.7 MMOL/L (ref 3.5–5.2)
PROT SERPL-MCNC: 7.3 G/DL (ref 6–8.5)
RBC # BLD AUTO: 3.55 10*6/MM3 (ref 3.77–5.28)
SODIUM SERPL-SCNC: 137 MMOL/L (ref 136–145)
TIBC SERPL-MCNC: 343 MCG/DL (ref 298–536)
TRANSFERRIN SERPL-MCNC: 230 MG/DL (ref 200–360)
TRIGL SERPL-MCNC: 164 MG/DL (ref 0–150)
VLDLC SERPL-MCNC: 28 MG/DL (ref 5–40)
WBC NRBC COR # BLD: 9.3 10*3/MM3 (ref 3.4–10.8)

## 2022-03-08 PROCEDURE — 83540 ASSAY OF IRON: CPT

## 2022-03-08 PROCEDURE — 80061 LIPID PANEL: CPT

## 2022-03-08 PROCEDURE — 36415 COLL VENOUS BLD VENIPUNCTURE: CPT

## 2022-03-08 PROCEDURE — 85025 COMPLETE CBC W/AUTO DIFF WBC: CPT

## 2022-03-08 PROCEDURE — 84466 ASSAY OF TRANSFERRIN: CPT

## 2022-03-08 PROCEDURE — 80053 COMPREHEN METABOLIC PANEL: CPT

## 2022-03-08 PROCEDURE — 84100 ASSAY OF PHOSPHORUS: CPT

## 2022-03-08 PROCEDURE — 83735 ASSAY OF MAGNESIUM: CPT

## 2022-03-10 ENCOUNTER — TELEPHONE (OUTPATIENT)
Dept: CARDIOLOGY | Facility: CLINIC | Age: 82
End: 2022-03-10

## 2022-03-10 NOTE — TELEPHONE ENCOUNTER
----- Message from CAS Ronquillo sent at 3/10/2022  8:08 AM EST -----  Kidney function is worse.  Strongly encouraged to keep appointment with nephrology on the 15th.  Blood sugar is high for fasting.  Needs to discuss diabetes control with PCP.  Cholesterols are reviewed.  LDL is at goal and triglycerides improving.  Continue current cholesterol meds

## 2022-04-08 ENCOUNTER — TRANSCRIBE ORDERS (OUTPATIENT)
Dept: LAB | Facility: HOSPITAL | Age: 82
End: 2022-04-08

## 2022-04-08 ENCOUNTER — LAB (OUTPATIENT)
Dept: LAB | Facility: HOSPITAL | Age: 82
End: 2022-04-08

## 2022-04-08 DIAGNOSIS — I10 HYPERTENSION, UNSPECIFIED TYPE: ICD-10-CM

## 2022-04-08 DIAGNOSIS — N18.30 STAGE 3 CHRONIC KIDNEY DISEASE, UNSPECIFIED WHETHER STAGE 3A OR 3B CKD: ICD-10-CM

## 2022-04-08 DIAGNOSIS — N18.30 STAGE 3 CHRONIC KIDNEY DISEASE, UNSPECIFIED WHETHER STAGE 3A OR 3B CKD: Primary | ICD-10-CM

## 2022-04-08 LAB
ALBUMIN SERPL-MCNC: 4 G/DL (ref 3.5–5.2)
ALBUMIN/GLOB SERPL: 1.1 G/DL
ALP SERPL-CCNC: 64 U/L (ref 39–117)
ALT SERPL W P-5'-P-CCNC: 9 U/L (ref 1–33)
ANION GAP SERPL CALCULATED.3IONS-SCNC: 12.2 MMOL/L (ref 5–15)
AST SERPL-CCNC: 13 U/L (ref 1–32)
BACTERIA UR QL AUTO: ABNORMAL /HPF
BASOPHILS # BLD AUTO: 0.07 10*3/MM3 (ref 0–0.2)
BASOPHILS NFR BLD AUTO: 0.7 % (ref 0–1.5)
BILIRUB SERPL-MCNC: 0.3 MG/DL (ref 0–1.2)
BILIRUB UR QL STRIP: NEGATIVE
BUN SERPL-MCNC: 38 MG/DL (ref 8–23)
BUN/CREAT SERPL: 16 (ref 7–25)
CALCIUM SPEC-SCNC: 9.6 MG/DL (ref 8.6–10.5)
CHLORIDE SERPL-SCNC: 100 MMOL/L (ref 98–107)
CLARITY UR: ABNORMAL
CO2 SERPL-SCNC: 24.8 MMOL/L (ref 22–29)
COLOR UR: YELLOW
CREAT SERPL-MCNC: 2.37 MG/DL (ref 0.57–1)
CREAT UR-MCNC: 118.6 MG/DL
DEPRECATED RDW RBC AUTO: 43.5 FL (ref 37–54)
EGFRCR SERPLBLD CKD-EPI 2021: 20.4 ML/MIN/1.73
EOSINOPHIL # BLD AUTO: 0.54 10*3/MM3 (ref 0–0.4)
EOSINOPHIL NFR BLD AUTO: 5.5 % (ref 0.3–6.2)
ERYTHROCYTE [DISTWIDTH] IN BLOOD BY AUTOMATED COUNT: 13.1 % (ref 12.3–15.4)
GLOBULIN UR ELPH-MCNC: 3.8 GM/DL
GLUCOSE SERPL-MCNC: 219 MG/DL (ref 65–99)
GLUCOSE UR STRIP-MCNC: NEGATIVE MG/DL
GRAN CASTS URNS QL MICRO: ABNORMAL /LPF
HCT VFR BLD AUTO: 31.9 % (ref 34–46.6)
HGB BLD-MCNC: 10.5 G/DL (ref 12–15.9)
HGB UR QL STRIP.AUTO: ABNORMAL
HYALINE CASTS UR QL AUTO: ABNORMAL /LPF
IMM GRANULOCYTES # BLD AUTO: 0.05 10*3/MM3 (ref 0–0.05)
IMM GRANULOCYTES NFR BLD AUTO: 0.5 % (ref 0–0.5)
KETONES UR QL STRIP: NEGATIVE
LEUKOCYTE ESTERASE UR QL STRIP.AUTO: ABNORMAL
LYMPHOCYTES # BLD AUTO: 2.26 10*3/MM3 (ref 0.7–3.1)
LYMPHOCYTES NFR BLD AUTO: 23.2 % (ref 19.6–45.3)
MCH RBC QN AUTO: 30.1 PG (ref 26.6–33)
MCHC RBC AUTO-ENTMCNC: 32.9 G/DL (ref 31.5–35.7)
MCV RBC AUTO: 91.4 FL (ref 79–97)
MONOCYTES # BLD AUTO: 0.89 10*3/MM3 (ref 0.1–0.9)
MONOCYTES NFR BLD AUTO: 9.1 % (ref 5–12)
NEUTROPHILS NFR BLD AUTO: 5.95 10*3/MM3 (ref 1.7–7)
NEUTROPHILS NFR BLD AUTO: 61 % (ref 42.7–76)
NITRITE UR QL STRIP: NEGATIVE
NRBC BLD AUTO-RTO: 0 /100 WBC (ref 0–0.2)
PH UR STRIP.AUTO: 5.5 [PH] (ref 5–8)
PLATELET # BLD AUTO: 262 10*3/MM3 (ref 140–450)
PMV BLD AUTO: 9.9 FL (ref 6–12)
POTASSIUM SERPL-SCNC: 4.6 MMOL/L (ref 3.5–5.2)
PROT ?TM UR-MCNC: 116.4 MG/DL
PROT SERPL-MCNC: 7.8 G/DL (ref 6–8.5)
PROT UR QL STRIP: ABNORMAL
PROT/CREAT UR: 0.98 MG/G{CREAT}
RBC # BLD AUTO: 3.49 10*6/MM3 (ref 3.77–5.28)
RBC # UR STRIP: ABNORMAL /HPF
REF LAB TEST METHOD: ABNORMAL
SODIUM SERPL-SCNC: 137 MMOL/L (ref 136–145)
SP GR UR STRIP: 1.02 (ref 1–1.03)
SQUAMOUS #/AREA URNS HPF: ABNORMAL /HPF
UROBILINOGEN UR QL STRIP: ABNORMAL
WBC # UR STRIP: ABNORMAL /HPF
WBC NRBC COR # BLD: 9.76 10*3/MM3 (ref 3.4–10.8)

## 2022-04-08 PROCEDURE — 80053 COMPREHEN METABOLIC PANEL: CPT

## 2022-04-08 PROCEDURE — 85025 COMPLETE CBC W/AUTO DIFF WBC: CPT

## 2022-04-08 PROCEDURE — 82043 UR ALBUMIN QUANTITATIVE: CPT

## 2022-04-08 PROCEDURE — 36415 COLL VENOUS BLD VENIPUNCTURE: CPT

## 2022-04-08 PROCEDURE — 82570 ASSAY OF URINE CREATININE: CPT

## 2022-04-08 PROCEDURE — 81001 URINALYSIS AUTO W/SCOPE: CPT

## 2022-04-08 PROCEDURE — 84156 ASSAY OF PROTEIN URINE: CPT

## 2022-04-09 LAB
ALBUMIN UR-MCNC: 14.4 MG/DL
CREAT UR-MCNC: 114.5 MG/DL
MICROALBUMIN/CREAT UR: 125.8 MG/G

## 2022-05-09 ENCOUNTER — TRANSCRIBE ORDERS (OUTPATIENT)
Dept: LAB | Facility: HOSPITAL | Age: 82
End: 2022-05-09

## 2022-05-09 ENCOUNTER — LAB (OUTPATIENT)
Dept: LAB | Facility: HOSPITAL | Age: 82
End: 2022-05-09

## 2022-05-09 DIAGNOSIS — N18.4 CHRONIC KIDNEY DISEASE, STAGE IV (SEVERE): Primary | ICD-10-CM

## 2022-05-09 DIAGNOSIS — N18.4 CHRONIC KIDNEY DISEASE, STAGE IV (SEVERE): ICD-10-CM

## 2022-05-09 LAB
25(OH)D3 SERPL-MCNC: 68.2 NG/ML (ref 30–100)
ALBUMIN SERPL-MCNC: 4.2 G/DL (ref 3.5–5.2)
ANION GAP SERPL CALCULATED.3IONS-SCNC: 11.5 MMOL/L (ref 5–15)
BACTERIA UR QL AUTO: ABNORMAL /HPF
BASOPHILS # BLD AUTO: 0.09 10*3/MM3 (ref 0–0.2)
BASOPHILS NFR BLD AUTO: 0.9 % (ref 0–1.5)
BILIRUB UR QL STRIP: NEGATIVE
BUN SERPL-MCNC: 52 MG/DL (ref 8–23)
BUN/CREAT SERPL: 15.8 (ref 7–25)
CALCIUM SPEC-SCNC: 10.9 MG/DL (ref 8.6–10.5)
CHLORIDE SERPL-SCNC: 97 MMOL/L (ref 98–107)
CLARITY UR: CLEAR
CO2 SERPL-SCNC: 27.5 MMOL/L (ref 22–29)
COLOR UR: YELLOW
CREAT SERPL-MCNC: 3.29 MG/DL (ref 0.57–1)
CREAT UR-MCNC: 122.3 MG/DL
DEPRECATED RDW RBC AUTO: 46.1 FL (ref 37–54)
EGFRCR SERPLBLD CKD-EPI 2021: 13.8 ML/MIN/1.73
EOSINOPHIL # BLD AUTO: 0.6 10*3/MM3 (ref 0–0.4)
EOSINOPHIL NFR BLD AUTO: 6 % (ref 0.3–6.2)
ERYTHROCYTE [DISTWIDTH] IN BLOOD BY AUTOMATED COUNT: 13.7 % (ref 12.3–15.4)
GLUCOSE SERPL-MCNC: 124 MG/DL (ref 65–99)
GLUCOSE UR STRIP-MCNC: NEGATIVE MG/DL
HCT VFR BLD AUTO: 32.1 % (ref 34–46.6)
HGB BLD-MCNC: 10.4 G/DL (ref 12–15.9)
HGB UR QL STRIP.AUTO: ABNORMAL
HYALINE CASTS UR QL AUTO: ABNORMAL /LPF
IMM GRANULOCYTES # BLD AUTO: 0.06 10*3/MM3 (ref 0–0.05)
IMM GRANULOCYTES NFR BLD AUTO: 0.6 % (ref 0–0.5)
KETONES UR QL STRIP: NEGATIVE
LEUKOCYTE ESTERASE UR QL STRIP.AUTO: ABNORMAL
LYMPHOCYTES # BLD AUTO: 2.38 10*3/MM3 (ref 0.7–3.1)
LYMPHOCYTES NFR BLD AUTO: 23.7 % (ref 19.6–45.3)
MCH RBC QN AUTO: 29.7 PG (ref 26.6–33)
MCHC RBC AUTO-ENTMCNC: 32.4 G/DL (ref 31.5–35.7)
MCV RBC AUTO: 91.7 FL (ref 79–97)
MONOCYTES # BLD AUTO: 0.86 10*3/MM3 (ref 0.1–0.9)
MONOCYTES NFR BLD AUTO: 8.5 % (ref 5–12)
NEUTROPHILS NFR BLD AUTO: 6.07 10*3/MM3 (ref 1.7–7)
NEUTROPHILS NFR BLD AUTO: 60.3 % (ref 42.7–76)
NITRITE UR QL STRIP: NEGATIVE
NRBC BLD AUTO-RTO: 0 /100 WBC (ref 0–0.2)
PH UR STRIP.AUTO: 6.5 [PH] (ref 5–8)
PHOSPHATE SERPL-MCNC: 4.4 MG/DL (ref 2.5–4.5)
PLATELET # BLD AUTO: 258 10*3/MM3 (ref 140–450)
PMV BLD AUTO: 9.8 FL (ref 6–12)
POTASSIUM SERPL-SCNC: 5.1 MMOL/L (ref 3.5–5.2)
PROT ?TM UR-MCNC: 115 MG/DL
PROT UR QL STRIP: ABNORMAL
PROT/CREAT UR: 0.94 MG/G{CREAT}
RBC # BLD AUTO: 3.5 10*6/MM3 (ref 3.77–5.28)
RBC # UR STRIP: ABNORMAL /HPF
REF LAB TEST METHOD: ABNORMAL
SODIUM SERPL-SCNC: 136 MMOL/L (ref 136–145)
SP GR UR STRIP: 1.02 (ref 1–1.03)
SQUAMOUS #/AREA URNS HPF: ABNORMAL /HPF
UROBILINOGEN UR QL STRIP: ABNORMAL
WBC # UR STRIP: ABNORMAL /HPF
WBC NRBC COR # BLD: 10.06 10*3/MM3 (ref 3.4–10.8)

## 2022-05-09 PROCEDURE — 85025 COMPLETE CBC W/AUTO DIFF WBC: CPT

## 2022-05-09 PROCEDURE — 82570 ASSAY OF URINE CREATININE: CPT

## 2022-05-09 PROCEDURE — 84466 ASSAY OF TRANSFERRIN: CPT

## 2022-05-09 PROCEDURE — 80069 RENAL FUNCTION PANEL: CPT

## 2022-05-09 PROCEDURE — 83540 ASSAY OF IRON: CPT

## 2022-05-09 PROCEDURE — 83970 ASSAY OF PARATHORMONE: CPT

## 2022-05-09 PROCEDURE — 82306 VITAMIN D 25 HYDROXY: CPT

## 2022-05-09 PROCEDURE — 36415 COLL VENOUS BLD VENIPUNCTURE: CPT

## 2022-05-09 PROCEDURE — 81001 URINALYSIS AUTO W/SCOPE: CPT

## 2022-05-09 PROCEDURE — 84156 ASSAY OF PROTEIN URINE: CPT

## 2022-05-10 LAB
IRON 24H UR-MRATE: 69 MCG/DL (ref 37–145)
IRON SATN MFR SERPL: 19 % (ref 20–50)
PTH-INTACT SERPL-MCNC: 32.1 PG/ML (ref 15–65)
TIBC SERPL-MCNC: 365 MCG/DL (ref 298–536)
TRANSFERRIN SERPL-MCNC: 245 MG/DL (ref 200–360)

## 2022-05-16 RX ORDER — ROSUVASTATIN CALCIUM 40 MG/1
TABLET, COATED ORAL
Qty: 90 TABLET | Refills: 3 | Status: SHIPPED | OUTPATIENT
Start: 2022-05-16

## 2022-05-27 RX ORDER — FUROSEMIDE 40 MG/1
TABLET ORAL
Qty: 102 TABLET | Refills: 3 | Status: SHIPPED | OUTPATIENT
Start: 2022-05-27 | End: 2022-07-26 | Stop reason: DRUGHIGH

## 2022-06-02 ENCOUNTER — TRANSCRIBE ORDERS (OUTPATIENT)
Dept: ADMINISTRATIVE | Facility: HOSPITAL | Age: 82
End: 2022-06-02

## 2022-06-02 ENCOUNTER — LAB (OUTPATIENT)
Dept: LAB | Facility: HOSPITAL | Age: 82
End: 2022-06-02

## 2022-06-02 DIAGNOSIS — N18.4 CHRONIC KIDNEY DISEASE, STAGE IV (SEVERE): Primary | ICD-10-CM

## 2022-06-02 DIAGNOSIS — N18.4 CHRONIC KIDNEY DISEASE, STAGE IV (SEVERE): ICD-10-CM

## 2022-06-02 LAB
ALBUMIN SERPL-MCNC: 3.9 G/DL (ref 3.5–5.2)
ANION GAP SERPL CALCULATED.3IONS-SCNC: 13.8 MMOL/L (ref 5–15)
BASOPHILS # BLD AUTO: 0.07 10*3/MM3 (ref 0–0.2)
BASOPHILS NFR BLD AUTO: 0.7 % (ref 0–1.5)
BUN SERPL-MCNC: 53 MG/DL (ref 8–23)
BUN/CREAT SERPL: 20.3 (ref 7–25)
CALCIUM SPEC-SCNC: 9 MG/DL (ref 8.6–10.5)
CHLORIDE SERPL-SCNC: 102 MMOL/L (ref 98–107)
CO2 SERPL-SCNC: 22.2 MMOL/L (ref 22–29)
CREAT SERPL-MCNC: 2.61 MG/DL (ref 0.57–1)
DEPRECATED RDW RBC AUTO: 43.5 FL (ref 37–54)
EGFRCR SERPLBLD CKD-EPI 2021: 18.2 ML/MIN/1.73
EOSINOPHIL # BLD AUTO: 0.46 10*3/MM3 (ref 0–0.4)
EOSINOPHIL NFR BLD AUTO: 4.7 % (ref 0.3–6.2)
ERYTHROCYTE [DISTWIDTH] IN BLOOD BY AUTOMATED COUNT: 13.5 % (ref 12.3–15.4)
GLUCOSE SERPL-MCNC: 129 MG/DL (ref 65–99)
HCT VFR BLD AUTO: 29.4 % (ref 34–46.6)
HGB BLD-MCNC: 9.7 G/DL (ref 12–15.9)
IMM GRANULOCYTES # BLD AUTO: 0.04 10*3/MM3 (ref 0–0.05)
IMM GRANULOCYTES NFR BLD AUTO: 0.4 % (ref 0–0.5)
LYMPHOCYTES # BLD AUTO: 2.49 10*3/MM3 (ref 0.7–3.1)
LYMPHOCYTES NFR BLD AUTO: 25.4 % (ref 19.6–45.3)
MCH RBC QN AUTO: 29 PG (ref 26.6–33)
MCHC RBC AUTO-ENTMCNC: 33 G/DL (ref 31.5–35.7)
MCV RBC AUTO: 88 FL (ref 79–97)
MONOCYTES # BLD AUTO: 0.94 10*3/MM3 (ref 0.1–0.9)
MONOCYTES NFR BLD AUTO: 9.6 % (ref 5–12)
NEUTROPHILS NFR BLD AUTO: 5.81 10*3/MM3 (ref 1.7–7)
NEUTROPHILS NFR BLD AUTO: 59.2 % (ref 42.7–76)
NRBC BLD AUTO-RTO: 0 /100 WBC (ref 0–0.2)
PHOSPHATE SERPL-MCNC: 3.5 MG/DL (ref 2.5–4.5)
PLATELET # BLD AUTO: 216 10*3/MM3 (ref 140–450)
PMV BLD AUTO: 10 FL (ref 6–12)
POTASSIUM SERPL-SCNC: 4 MMOL/L (ref 3.5–5.2)
PTH-INTACT SERPL-MCNC: 124 PG/ML (ref 15–65)
RBC # BLD AUTO: 3.34 10*6/MM3 (ref 3.77–5.28)
SODIUM SERPL-SCNC: 138 MMOL/L (ref 136–145)
WBC NRBC COR # BLD: 9.81 10*3/MM3 (ref 3.4–10.8)

## 2022-06-02 PROCEDURE — 83970 ASSAY OF PARATHORMONE: CPT

## 2022-06-02 PROCEDURE — 36415 COLL VENOUS BLD VENIPUNCTURE: CPT

## 2022-06-02 PROCEDURE — 85025 COMPLETE CBC W/AUTO DIFF WBC: CPT

## 2022-06-02 PROCEDURE — 80069 RENAL FUNCTION PANEL: CPT

## 2022-07-26 ENCOUNTER — OFFICE VISIT (OUTPATIENT)
Dept: CARDIOLOGY | Facility: CLINIC | Age: 82
End: 2022-07-26

## 2022-07-26 VITALS
HEIGHT: 67 IN | BODY MASS INDEX: 30.45 KG/M2 | SYSTOLIC BLOOD PRESSURE: 147 MMHG | WEIGHT: 194 LBS | HEART RATE: 81 BPM | DIASTOLIC BLOOD PRESSURE: 76 MMHG

## 2022-07-26 DIAGNOSIS — I10 HYPERTENSION, ESSENTIAL: Chronic | ICD-10-CM

## 2022-07-26 DIAGNOSIS — I25.10 CORONARY ARTERY DISEASE INVOLVING NATIVE HEART WITHOUT ANGINA PECTORIS, UNSPECIFIED VESSEL OR LESION TYPE: Primary | Chronic | ICD-10-CM

## 2022-07-26 DIAGNOSIS — N18.4 CKD (CHRONIC KIDNEY DISEASE) STAGE 4, GFR 15-29 ML/MIN: Chronic | ICD-10-CM

## 2022-07-26 DIAGNOSIS — E78.2 MIXED HYPERLIPIDEMIA: ICD-10-CM

## 2022-07-26 PROCEDURE — 99214 OFFICE O/P EST MOD 30 MIN: CPT | Performed by: NURSE PRACTITIONER

## 2022-07-26 RX ORDER — FUROSEMIDE 40 MG/1
40 TABLET ORAL DAILY
COMMUNITY

## 2022-07-26 NOTE — ASSESSMENT & PLAN NOTE
Has some whitecoat syndrome with good readings at home.  Continue carvedilol 25 mg twice daily and furosemide 40 mg.  Not on ACE or ARB due to kidney function.

## 2022-07-26 NOTE — PROGRESS NOTES
Chief Complaint  Coronary Artery Disease and Hypertension    Subjective        Mercedes Felipe presents to [facility] MEDICAL [facility] CARDIOLOGY  She is an 82-year-old female comes in to evaluate her coronary artery disease, hypertension and hyperlipidemia.  Continues to be short of breath its moderate with mild exertion she uses oxygen at 2 L at home most the time.  Did not bring it today because she feels like she was not getting be out long.  She states overall shortness of breath is unchanged.  Denies any chest pains,  palpitations, dizziness, syncope, swelling, PND, or orthopnea.  Cardiac wise she has no further complaints.  She also says she feels  better than she has in a long time.  Home blood pressures range between 98/60 - 156/83 and most in good range     Past History:    Past Medical History:   Diagnosis Date   • Ankle pain    • Arthritis    • Asthma    • CAD (coronary artery disease)    • CKD (chronic kidney disease) stage 4, GFR 15-29 ml/min ([facility]) 9/21/2021   • COPD (chronic obstructive pulmonary disease) ([facility])    • Coronary artery disease 1/1/2007    Coronary artery disease with 100% occluded right coronary artery with collaterals demonstrated on cardiac catheterization in 2007;   • Diabetes ([facility]) 01/07/2019   • Essential hypertension    • Fracture    • Gout    • High cholesterol    • History of emphysema    • Hypertension, essential 9/21/2021   • Hyperthyroidism    • Myocardial infarction ([facility])    • Numbness in feet    • Other specified hypothyroidism 9/21/2021   • Rectal bleeding 02/20/2014   • Type 1 diabetes mellitus ([facility])         Family History: family history includes Breast cancer in her daughter; Diabetes in her brother and sister; Heart attack in an other family member; Heart disease in her father and mother; Other in an other family member; Stroke in an other family member.     Social History: reports that she has quit smoking. She has a 45.00 pack-year smoking history. She has never  used smokeless tobacco. She reports that she does not drink alcohol and does not use drugs.    Allergies: Penicillins and Sulfa antibiotics    Past Surgical History:   Procedure Laterality Date   • BACK SURGERY     • BREAST SURGERY     • DILATATION AND CURETTAGE     • TOTAL THYROIDECTOMY     • TUBAL ABDOMINAL LIGATION            Current Outpatient Medications:   •  acetaminophen (TYLENOL) 500 MG tablet, Take 500 mg by mouth Every 6 (Six) Hours As Needed for Mild Pain ., Disp: , Rfl:   •  albuterol sulfate  (90 Base) MCG/ACT inhaler, Inhale 2 puffs Every 4 (Four) Hours As Needed for Wheezing., Disp: , Rfl:   •  aspirin 81 MG EC tablet, Take 81 mg by mouth Daily., Disp: , Rfl:   •  calcitriol (ROCALTROL) 0.25 MCG capsule, Take 0.25 mcg by mouth Daily., Disp: , Rfl:   •  carvedilol (COREG) 25 MG tablet, Take 1 tablet by mouth 2 (Two) Times a Day., Disp: 180 tablet, Rfl: 3  •  Casanthranol-Docusate Sodium (LAXATIVE PLUS STOOL SOFTENER PO), Take  by mouth., Disp: , Rfl:   •  cetirizine (zyrTEC) 10 MG tablet, Take 10 mg by mouth Daily., Disp: , Rfl:   •  Dulaglutide (Trulicity) 3 MG/0.5ML solution pen-injector, Inject  under the skin into the appropriate area as directed., Disp: , Rfl:   •  febuxostat (ULORIC) 40 MG tablet, Take 40 mg by mouth Daily., Disp: , Rfl:   •  Fluticasone-Umeclidin-Vilant (TRELEGY) 100-62.5-25 MCG/INH inhaler, Inhale 1 puff Daily., Disp: , Rfl:   •  folic acid (FOLVITE) 1 MG tablet, Take 1 mg by mouth Daily., Disp: , Rfl:   •  furosemide (LASIX) 40 MG tablet, Take 40 mg by mouth Daily., Disp: , Rfl:   •  glimepiride (AMARYL) 2 MG tablet, Take 2 mg by mouth Every Morning Before Breakfast., Disp: , Rfl:   •  insulin glargine (Lantus) 100 UNIT/ML injection, Inject  under the skin into the appropriate area as directed Daily., Disp: , Rfl:   •  Krill Oil 500 MG capsule, Take  by mouth., Disp: , Rfl:   •  levothyroxine (SYNTHROID, LEVOTHROID) 50 MCG tablet, Take 50 mcg by mouth Daily., Disp: ,  "Rfl:   •  liothyronine (CYTOMEL) 5 MCG tablet, 5 mcg. Take 2 tablet in the AM and 1 tablet q pm, Disp: , Rfl:   •  magnesium oxide (MAGOX) 400 (241.3 Mg) MG tablet tablet, Take 400 mg by mouth Daily., Disp: , Rfl:   •  nitroglycerin (NITROSTAT) 0.4 MG SL tablet, Place 0.4 mg under the tongue Every 5 (Five) Minutes As Needed for Chest Pain. Take no more than 3 doses in 15 minutes., Disp: , Rfl:   •  rosuvastatin (CRESTOR) 40 MG tablet, TAKE ONE TABLET BY MOUTH EVERY DAY, Disp: 90 tablet, Rfl: 3  •  vitamin D3 125 MCG (5000 UT) capsule capsule, Take 5,000 Units by mouth Daily., Disp: , Rfl:      Medications Discontinued During This Encounter   Medication Reason   • furosemide (LASIX) 40 MG tablet Dose adjustment        Review of Systems   Constitutional: Positive for fatigue.   Respiratory: Positive for cough and shortness of breath.    Cardiovascular: Negative for chest pain, palpitations and leg swelling.   Neurological: Negative for dizziness.   All other systems reviewed and are negative.       Objective     Physical Exam  Constitutional:       General: She is not in acute distress.     Appearance: She is obese.   Neck:      Vascular: No carotid bruit.   Cardiovascular:      Rate and Rhythm: Normal rate and regular rhythm.      Chest Wall: PMI is displaced.      Heart sounds: Heart sounds are distant.   Pulmonary:      Effort: Pulmonary effort is normal.      Breath sounds: Normal breath sounds.      Comments: Increased AP diameter  Musculoskeletal:      Right lower leg: No edema.      Left lower leg: No edema.   Neurological:      Mental Status: She is alert.       /76   Pulse 81   Ht 170.2 cm (67\")   Wt 88 kg (194 lb)   BMI 30.38 kg/m²       Vitals:    07/26/22 1021   BP: 147/76   Pulse: 81       Result Review :         The following data was reviewed by: CAS Ronquillo on 07/26/2022:      No results found for: PROBNP, BNP  CMP    CMP 4/8/22 5/9/22 6/2/22   Glucose 219 (A) 124 (A) 129 (A)   BUN " 38 (A) 52 (A) 53 (A)   Creatinine 2.37 (A) 3.29 (A) 2.61 (A)   Sodium 137 136 138   Potassium 4.6 5.1 4.0   Chloride 100 97 (A) 102   Calcium 9.6 10.9 (A) 9.0   Albumin 4.00 4.20 3.90   Total Bilirubin 0.3     Alkaline Phosphatase 64     AST (SGOT) 13     ALT (SGPT) 9     (A) Abnormal value            CBC w/diff    CBC w/Diff 4/8/22 5/9/22 6/2/22   WBC 9.76 10.06 9.81   RBC 3.49 (A) 3.50 (A) 3.34 (A)   Hemoglobin 10.5 (A) 10.4 (A) 9.7 (A)   Hematocrit 31.9 (A) 32.1 (A) 29.4 (A)   MCV 91.4 91.7 88.0   MCH 30.1 29.7 29.0   MCHC 32.9 32.4 33.0   RDW 13.1 13.7 13.5   Platelets 262 258 216   Neutrophil Rel % 61.0 60.3 59.2   Immature Granulocyte Rel % 0.5 0.6 (A) 0.4   Lymphocyte Rel % 23.2 23.7 25.4   Monocyte Rel % 9.1 8.5 9.6   Eosinophil Rel % 5.5 6.0 4.7   Basophil Rel % 0.7 0.9 0.7   (A) Abnormal value             Lipid Panel    Lipid Panel 9/15/21 3/8/22   Total Cholesterol 132 124   Triglycerides 213 (A) 164 (A)   HDL Cholesterol 41 43   VLDL Cholesterol 35 28   LDL Cholesterol  56 53   LDL/HDL Ratio 1.18 1.12   (A) Abnormal value             Lab Results   Component Value Date    TSH 2.070 09/15/2021      Lab Results   Component Value Date    FREET4 0.89 (L) 09/15/2021      Magnesium   Date Value Ref Range Status   03/08/2022 2.0 1.6 - 2.4 mg/dL Final                      Assessment and Plan    Diagnoses and all orders for this visit:    1. Coronary artery disease involving native heart without angina pectoris, unspecified vessel or lesion type (Primary)  Assessment & Plan:  Without angina.  Continue nitro as needed and aspirin 81 mg.      2. Mixed hyperlipidemia  Assessment & Plan:  Mixed hyperlipidemia with LDL at goal.  Continue rosuvastatin 40 mg.    Orders:  -     Lipid Panel; Future  -     Comprehensive Metabolic Panel; Future    3. CKD (chronic kidney disease) stage 4, GFR 15-29 ml/min ([facility])  Assessment & Plan:  Monitored by nephrology and PCP.  We will continue to monitor also.      4. Hypertension,  essential  Assessment & Plan:  Has some whitecoat syndrome with good readings at home.  Continue carvedilol 25 mg twice daily and furosemide 40 mg.  Not on ACE or ARB due to kidney function.            Follow Up     Return in about 6 months (around 1/26/2023) for with Dr West.    Patient was given instructions and counseling regarding her condition or for health maintenance advice. Please see specific information pulled into the AVS if appropriate.       CAS Arreola  08/01/22 10:27 EDT

## 2022-08-11 ENCOUNTER — TRANSCRIBE ORDERS (OUTPATIENT)
Dept: LAB | Facility: HOSPITAL | Age: 82
End: 2022-08-11

## 2022-08-11 ENCOUNTER — LAB (OUTPATIENT)
Dept: LAB | Facility: HOSPITAL | Age: 82
End: 2022-08-11

## 2022-08-11 DIAGNOSIS — N18.4 CHRONIC KIDNEY DISEASE, STAGE IV (SEVERE): ICD-10-CM

## 2022-08-11 DIAGNOSIS — D63.1 ANEMIA IN STAGE 4 CHRONIC KIDNEY DISEASE: ICD-10-CM

## 2022-08-11 DIAGNOSIS — N18.4 ANEMIA IN STAGE 4 CHRONIC KIDNEY DISEASE: ICD-10-CM

## 2022-08-11 DIAGNOSIS — N18.4 CHRONIC KIDNEY DISEASE, STAGE IV (SEVERE): Primary | ICD-10-CM

## 2022-08-11 DIAGNOSIS — E21.1 SECONDARY HYPERPARATHYROIDISM, NOT ELSEWHERE CLASSIFIED: ICD-10-CM

## 2022-08-11 DIAGNOSIS — E55.9 VITAMIN D DEFICIENCY: ICD-10-CM

## 2022-08-11 LAB
25(OH)D3 SERPL-MCNC: 63.9 NG/ML (ref 30–100)
ALBUMIN SERPL-MCNC: 4 G/DL (ref 3.5–5.2)
ANION GAP SERPL CALCULATED.3IONS-SCNC: 13 MMOL/L (ref 5–15)
BACTERIA UR QL AUTO: ABNORMAL /HPF
BASOPHILS # BLD AUTO: 0.08 10*3/MM3 (ref 0–0.2)
BASOPHILS NFR BLD AUTO: 0.9 % (ref 0–1.5)
BILIRUB UR QL STRIP: NEGATIVE
BUN SERPL-MCNC: 45 MG/DL (ref 8–23)
BUN/CREAT SERPL: 17.1 (ref 7–25)
CALCIUM SPEC-SCNC: 9.4 MG/DL (ref 8.6–10.5)
CHLORIDE SERPL-SCNC: 101 MMOL/L (ref 98–107)
CLARITY UR: CLEAR
CO2 SERPL-SCNC: 25 MMOL/L (ref 22–29)
COLOR UR: YELLOW
CREAT SERPL-MCNC: 2.63 MG/DL (ref 0.57–1)
CREAT UR-MCNC: 114.9 MG/DL
DEPRECATED RDW RBC AUTO: 44.9 FL (ref 37–54)
EGFRCR SERPLBLD CKD-EPI 2021: 17.9 ML/MIN/1.73
EOSINOPHIL # BLD AUTO: 0.53 10*3/MM3 (ref 0–0.4)
EOSINOPHIL NFR BLD AUTO: 6 % (ref 0.3–6.2)
ERYTHROCYTE [DISTWIDTH] IN BLOOD BY AUTOMATED COUNT: 13.7 % (ref 12.3–15.4)
GLUCOSE SERPL-MCNC: 90 MG/DL (ref 65–99)
GLUCOSE UR STRIP-MCNC: NEGATIVE MG/DL
HCT VFR BLD AUTO: 31.1 % (ref 34–46.6)
HGB BLD-MCNC: 10.2 G/DL (ref 12–15.9)
HGB UR QL STRIP.AUTO: ABNORMAL
HYALINE CASTS UR QL AUTO: ABNORMAL /LPF
IMM GRANULOCYTES # BLD AUTO: 0.03 10*3/MM3 (ref 0–0.05)
IMM GRANULOCYTES NFR BLD AUTO: 0.3 % (ref 0–0.5)
IRON 24H UR-MRATE: 52 MCG/DL (ref 37–145)
IRON SATN MFR SERPL: 15 % (ref 20–50)
KETONES UR QL STRIP: NEGATIVE
LEUKOCYTE ESTERASE UR QL STRIP.AUTO: ABNORMAL
LYMPHOCYTES # BLD AUTO: 2.36 10*3/MM3 (ref 0.7–3.1)
LYMPHOCYTES NFR BLD AUTO: 26.7 % (ref 19.6–45.3)
MCH RBC QN AUTO: 29.8 PG (ref 26.6–33)
MCHC RBC AUTO-ENTMCNC: 32.8 G/DL (ref 31.5–35.7)
MCV RBC AUTO: 90.9 FL (ref 79–97)
MONOCYTES # BLD AUTO: 0.75 10*3/MM3 (ref 0.1–0.9)
MONOCYTES NFR BLD AUTO: 8.5 % (ref 5–12)
NEUTROPHILS NFR BLD AUTO: 5.08 10*3/MM3 (ref 1.7–7)
NEUTROPHILS NFR BLD AUTO: 57.6 % (ref 42.7–76)
NITRITE UR QL STRIP: NEGATIVE
NRBC BLD AUTO-RTO: 0 /100 WBC (ref 0–0.2)
PH UR STRIP.AUTO: 6.5 [PH] (ref 5–8)
PHOSPHATE SERPL-MCNC: 4.3 MG/DL (ref 2.5–4.5)
PLATELET # BLD AUTO: 235 10*3/MM3 (ref 140–450)
PMV BLD AUTO: 10 FL (ref 6–12)
POTASSIUM SERPL-SCNC: 4.7 MMOL/L (ref 3.5–5.2)
PROT ?TM UR-MCNC: 124.4 MG/DL
PROT UR QL STRIP: ABNORMAL
PROT/CREAT UR: 1.08 MG/G{CREAT}
PTH-INTACT SERPL-MCNC: 148 PG/ML (ref 15–65)
RBC # BLD AUTO: 3.42 10*6/MM3 (ref 3.77–5.28)
RBC # UR STRIP: ABNORMAL /HPF
REF LAB TEST METHOD: ABNORMAL
SODIUM SERPL-SCNC: 139 MMOL/L (ref 136–145)
SP GR UR STRIP: 1.02 (ref 1–1.03)
SQUAMOUS #/AREA URNS HPF: ABNORMAL /HPF
TIBC SERPL-MCNC: 346 MCG/DL (ref 298–536)
TRANSFERRIN SERPL-MCNC: 232 MG/DL (ref 200–360)
UROBILINOGEN UR QL STRIP: ABNORMAL
WBC # UR STRIP: ABNORMAL /HPF
WBC NRBC COR # BLD: 8.83 10*3/MM3 (ref 3.4–10.8)

## 2022-08-11 PROCEDURE — 82306 VITAMIN D 25 HYDROXY: CPT

## 2022-08-11 PROCEDURE — 80069 RENAL FUNCTION PANEL: CPT

## 2022-08-11 PROCEDURE — 84156 ASSAY OF PROTEIN URINE: CPT

## 2022-08-11 PROCEDURE — 84466 ASSAY OF TRANSFERRIN: CPT

## 2022-08-11 PROCEDURE — 82570 ASSAY OF URINE CREATININE: CPT

## 2022-08-11 PROCEDURE — 85025 COMPLETE CBC W/AUTO DIFF WBC: CPT

## 2022-08-11 PROCEDURE — 36415 COLL VENOUS BLD VENIPUNCTURE: CPT

## 2022-08-11 PROCEDURE — 83540 ASSAY OF IRON: CPT

## 2022-08-11 PROCEDURE — 81001 URINALYSIS AUTO W/SCOPE: CPT

## 2022-08-11 PROCEDURE — 83970 ASSAY OF PARATHORMONE: CPT

## 2022-08-12 ENCOUNTER — TRANSCRIBE ORDERS (OUTPATIENT)
Dept: ADMINISTRATIVE | Facility: HOSPITAL | Age: 82
End: 2022-08-12

## 2022-08-12 DIAGNOSIS — N18.6 ANEMIA IN END-STAGE RENAL DISEASE: Primary | ICD-10-CM

## 2022-08-12 DIAGNOSIS — D63.1 ANEMIA IN END-STAGE RENAL DISEASE: Primary | ICD-10-CM

## 2022-08-15 PROBLEM — N18.4 ANEMIA OF CHRONIC RENAL FAILURE, STAGE 4 (SEVERE): Status: ACTIVE | Noted: 2022-08-15

## 2022-08-15 PROBLEM — D50.8 OTHER IRON DEFICIENCY ANEMIAS: Status: ACTIVE | Noted: 2022-08-15

## 2022-08-15 PROBLEM — D63.1 ANEMIA OF CHRONIC RENAL FAILURE, STAGE 4 (SEVERE): Status: ACTIVE | Noted: 2022-08-15

## 2022-08-17 ENCOUNTER — HOSPITAL ENCOUNTER (OUTPATIENT)
Dept: INFUSION THERAPY | Facility: HOSPITAL | Age: 82
Setting detail: INFUSION SERIES
Discharge: HOME | End: 2022-08-17

## 2022-08-17 VITALS
RESPIRATION RATE: 20 BRPM | SYSTOLIC BLOOD PRESSURE: 107 MMHG | WEIGHT: 188.49 LBS | HEART RATE: 74 BPM | DIASTOLIC BLOOD PRESSURE: 57 MMHG | HEIGHT: 60 IN | BODY MASS INDEX: 37.01 KG/M2 | OXYGEN SATURATION: 100 % | TEMPERATURE: 97.8 F

## 2022-08-17 DIAGNOSIS — N18.4 CKD (CHRONIC KIDNEY DISEASE) STAGE 4, GFR 15-29 ML/MIN: ICD-10-CM

## 2022-08-17 DIAGNOSIS — D50.8 OTHER IRON DEFICIENCY ANEMIAS: ICD-10-CM

## 2022-08-17 DIAGNOSIS — D63.1 ANEMIA OF CHRONIC RENAL FAILURE, STAGE 4 (SEVERE): Primary | ICD-10-CM

## 2022-08-17 DIAGNOSIS — N18.4 ANEMIA OF CHRONIC RENAL FAILURE, STAGE 4 (SEVERE): Primary | ICD-10-CM

## 2022-08-17 PROCEDURE — 25010000002 FERRIC CARBOXYMALTOSE 750 MG/15ML SOLUTION: Performed by: INTERNAL MEDICINE

## 2022-08-17 PROCEDURE — 96374 THER/PROPH/DIAG INJ IV PUSH: CPT

## 2022-08-17 PROCEDURE — 96365 THER/PROPH/DIAG IV INF INIT: CPT

## 2022-08-17 RX ADMIN — FERRIC CARBOXYMALTOSE INJECTION 750 MG: 50 INJECTION, SOLUTION INTRAVENOUS at 13:12

## 2022-08-24 ENCOUNTER — HOSPITAL ENCOUNTER (OUTPATIENT)
Dept: INFUSION THERAPY | Facility: HOSPITAL | Age: 82
Setting detail: INFUSION SERIES
Discharge: HOME | End: 2022-08-24

## 2022-08-24 VITALS
SYSTOLIC BLOOD PRESSURE: 120 MMHG | WEIGHT: 187.83 LBS | OXYGEN SATURATION: 99 % | HEIGHT: 67 IN | BODY MASS INDEX: 29.48 KG/M2 | DIASTOLIC BLOOD PRESSURE: 64 MMHG | HEART RATE: 68 BPM | TEMPERATURE: 98.1 F | RESPIRATION RATE: 18 BRPM

## 2022-08-24 DIAGNOSIS — D63.1 ANEMIA OF CHRONIC RENAL FAILURE, STAGE 4 (SEVERE): Primary | ICD-10-CM

## 2022-08-24 DIAGNOSIS — N18.4 ANEMIA OF CHRONIC RENAL FAILURE, STAGE 4 (SEVERE): Primary | ICD-10-CM

## 2022-08-24 DIAGNOSIS — N18.4 CKD (CHRONIC KIDNEY DISEASE) STAGE 4, GFR 15-29 ML/MIN: ICD-10-CM

## 2022-08-24 DIAGNOSIS — D50.8 OTHER IRON DEFICIENCY ANEMIAS: ICD-10-CM

## 2022-08-24 PROCEDURE — 96374 THER/PROPH/DIAG INJ IV PUSH: CPT

## 2022-08-24 PROCEDURE — 25010000002 FERRIC CARBOXYMALTOSE 750 MG/15ML SOLUTION: Performed by: INTERNAL MEDICINE

## 2022-08-24 RX ADMIN — FERRIC CARBOXYMALTOSE INJECTION 750 MG: 50 INJECTION, SOLUTION INTRAVENOUS at 13:35

## 2022-11-08 ENCOUNTER — TRANSCRIBE ORDERS (OUTPATIENT)
Dept: LAB | Facility: HOSPITAL | Age: 82
End: 2022-11-08

## 2022-11-08 ENCOUNTER — LAB (OUTPATIENT)
Dept: LAB | Facility: HOSPITAL | Age: 82
End: 2022-11-08

## 2022-11-08 DIAGNOSIS — N18.4 CHRONIC KIDNEY DISEASE, STAGE IV (SEVERE): ICD-10-CM

## 2022-11-08 DIAGNOSIS — N18.6 ANEMIA IN END-STAGE RENAL DISEASE: Primary | ICD-10-CM

## 2022-11-08 DIAGNOSIS — D50.8 IRON DEFICIENCY ANEMIA SECONDARY TO INADEQUATE DIETARY IRON INTAKE: ICD-10-CM

## 2022-11-08 DIAGNOSIS — N18.6 ANEMIA IN END-STAGE RENAL DISEASE: ICD-10-CM

## 2022-11-08 DIAGNOSIS — D63.1 ANEMIA IN END-STAGE RENAL DISEASE: Primary | ICD-10-CM

## 2022-11-08 DIAGNOSIS — D63.1 ANEMIA IN END-STAGE RENAL DISEASE: ICD-10-CM

## 2022-11-08 LAB
BACTERIA UR QL AUTO: ABNORMAL /HPF
BILIRUB UR QL STRIP: NEGATIVE
CLARITY UR: CLEAR
COLOR UR: YELLOW
CREAT UR-MCNC: 77 MG/DL
GLUCOSE UR STRIP-MCNC: ABNORMAL MG/DL
HGB UR QL STRIP.AUTO: ABNORMAL
HYALINE CASTS UR QL AUTO: ABNORMAL /LPF
KETONES UR QL STRIP: NEGATIVE
LEUKOCYTE ESTERASE UR QL STRIP.AUTO: ABNORMAL
NITRITE UR QL STRIP: NEGATIVE
PH UR STRIP.AUTO: 6.5 [PH] (ref 5–8)
PROT ?TM UR-MCNC: 55.3 MG/DL
PROT UR QL STRIP: ABNORMAL
PROT/CREAT UR: 0.72 MG/G{CREAT}
RBC # UR STRIP: ABNORMAL /HPF
REF LAB TEST METHOD: ABNORMAL
SP GR UR STRIP: 1.02 (ref 1–1.03)
SQUAMOUS #/AREA URNS HPF: ABNORMAL /HPF
UROBILINOGEN UR QL STRIP: ABNORMAL
WBC # UR STRIP: ABNORMAL /HPF

## 2022-11-08 PROCEDURE — 82570 ASSAY OF URINE CREATININE: CPT

## 2022-11-08 PROCEDURE — 80069 RENAL FUNCTION PANEL: CPT

## 2022-11-08 PROCEDURE — 81001 URINALYSIS AUTO W/SCOPE: CPT

## 2022-11-08 PROCEDURE — 36415 COLL VENOUS BLD VENIPUNCTURE: CPT

## 2022-11-08 PROCEDURE — 84156 ASSAY OF PROTEIN URINE: CPT

## 2022-11-08 PROCEDURE — 85025 COMPLETE CBC W/AUTO DIFF WBC: CPT

## 2022-11-09 LAB
ALBUMIN SERPL-MCNC: 3.9 G/DL (ref 3.5–5.2)
ANION GAP SERPL CALCULATED.3IONS-SCNC: 10 MMOL/L (ref 5–15)
BASOPHILS # BLD AUTO: 0.07 10*3/MM3 (ref 0–0.2)
BASOPHILS NFR BLD AUTO: 0.9 % (ref 0–1.5)
BUN SERPL-MCNC: 42 MG/DL (ref 8–23)
BUN/CREAT SERPL: 16.6 (ref 7–25)
CALCIUM SPEC-SCNC: 9.5 MG/DL (ref 8.6–10.5)
CHLORIDE SERPL-SCNC: 104 MMOL/L (ref 98–107)
CO2 SERPL-SCNC: 23 MMOL/L (ref 22–29)
CREAT SERPL-MCNC: 2.53 MG/DL (ref 0.57–1)
DEPRECATED RDW RBC AUTO: 42.8 FL (ref 37–54)
EGFRCR SERPLBLD CKD-EPI 2021: 18.7 ML/MIN/1.73
EOSINOPHIL # BLD AUTO: 0.5 10*3/MM3 (ref 0–0.4)
EOSINOPHIL NFR BLD AUTO: 6.5 % (ref 0.3–6.2)
ERYTHROCYTE [DISTWIDTH] IN BLOOD BY AUTOMATED COUNT: 12.8 % (ref 12.3–15.4)
GLUCOSE SERPL-MCNC: 210 MG/DL (ref 65–99)
HCT VFR BLD AUTO: 33.9 % (ref 34–46.6)
HGB BLD-MCNC: 11.6 G/DL (ref 12–15.9)
IMM GRANULOCYTES # BLD AUTO: 0.02 10*3/MM3 (ref 0–0.05)
IMM GRANULOCYTES NFR BLD AUTO: 0.3 % (ref 0–0.5)
LYMPHOCYTES # BLD AUTO: 2.28 10*3/MM3 (ref 0.7–3.1)
LYMPHOCYTES NFR BLD AUTO: 29.8 % (ref 19.6–45.3)
MCH RBC QN AUTO: 31.3 PG (ref 26.6–33)
MCHC RBC AUTO-ENTMCNC: 34.2 G/DL (ref 31.5–35.7)
MCV RBC AUTO: 91.4 FL (ref 79–97)
MONOCYTES # BLD AUTO: 0.61 10*3/MM3 (ref 0.1–0.9)
MONOCYTES NFR BLD AUTO: 8 % (ref 5–12)
NEUTROPHILS NFR BLD AUTO: 4.17 10*3/MM3 (ref 1.7–7)
NEUTROPHILS NFR BLD AUTO: 54.5 % (ref 42.7–76)
NRBC BLD AUTO-RTO: 0 /100 WBC (ref 0–0.2)
PHOSPHATE SERPL-MCNC: 3.4 MG/DL (ref 2.5–4.5)
PLATELET # BLD AUTO: 196 10*3/MM3 (ref 140–450)
PMV BLD AUTO: 10.2 FL (ref 6–12)
POTASSIUM SERPL-SCNC: 5.8 MMOL/L (ref 3.5–5.2)
RBC # BLD AUTO: 3.71 10*6/MM3 (ref 3.77–5.28)
SODIUM SERPL-SCNC: 137 MMOL/L (ref 136–145)
WBC NRBC COR # BLD: 7.65 10*3/MM3 (ref 3.4–10.8)

## 2023-01-25 ENCOUNTER — TRANSCRIBE ORDERS (OUTPATIENT)
Dept: LAB | Facility: HOSPITAL | Age: 83
End: 2023-01-25
Payer: MEDICARE

## 2023-01-25 DIAGNOSIS — D63.1 ANEMIA IN END-STAGE RENAL DISEASE: Primary | ICD-10-CM

## 2023-01-25 DIAGNOSIS — N18.6 ANEMIA IN END-STAGE RENAL DISEASE: Primary | ICD-10-CM

## 2023-01-25 DIAGNOSIS — N18.4 CHRONIC KIDNEY DISEASE, STAGE IV (SEVERE): ICD-10-CM

## 2023-01-25 DIAGNOSIS — D50.8 IRON DEFICIENCY ANEMIA SECONDARY TO INADEQUATE DIETARY IRON INTAKE: ICD-10-CM

## 2023-01-27 ENCOUNTER — LAB (OUTPATIENT)
Dept: LAB | Facility: HOSPITAL | Age: 83
End: 2023-01-27
Payer: MEDICARE

## 2023-01-27 DIAGNOSIS — N18.4 CHRONIC KIDNEY DISEASE, STAGE IV (SEVERE): ICD-10-CM

## 2023-01-27 DIAGNOSIS — D63.1 ANEMIA IN END-STAGE RENAL DISEASE: ICD-10-CM

## 2023-01-27 DIAGNOSIS — N18.6 ANEMIA IN END-STAGE RENAL DISEASE: ICD-10-CM

## 2023-01-27 DIAGNOSIS — E78.2 MIXED HYPERLIPIDEMIA: ICD-10-CM

## 2023-01-27 DIAGNOSIS — D50.8 IRON DEFICIENCY ANEMIA SECONDARY TO INADEQUATE DIETARY IRON INTAKE: ICD-10-CM

## 2023-01-27 LAB
ALBUMIN SERPL-MCNC: 4 G/DL (ref 3.5–5.2)
ALBUMIN/GLOB SERPL: 1.3 G/DL
ALP SERPL-CCNC: 73 U/L (ref 39–117)
ALT SERPL W P-5'-P-CCNC: 8 U/L (ref 1–33)
ANION GAP SERPL CALCULATED.3IONS-SCNC: 11 MMOL/L (ref 5–15)
AST SERPL-CCNC: 12 U/L (ref 1–32)
BASOPHILS # BLD AUTO: 0.05 10*3/MM3 (ref 0–0.2)
BASOPHILS NFR BLD AUTO: 0.5 % (ref 0–1.5)
BILIRUB SERPL-MCNC: 0.3 MG/DL (ref 0–1.2)
BILIRUB UR QL STRIP: NEGATIVE
BUN SERPL-MCNC: 52 MG/DL (ref 8–23)
BUN/CREAT SERPL: 21.6 (ref 7–25)
CALCIUM SPEC-SCNC: 9.5 MG/DL (ref 8.6–10.5)
CHLORIDE SERPL-SCNC: 102 MMOL/L (ref 98–107)
CHOLEST SERPL-MCNC: 128 MG/DL (ref 0–200)
CLARITY UR: CLEAR
CO2 SERPL-SCNC: 24 MMOL/L (ref 22–29)
COLOR UR: YELLOW
CREAT SERPL-MCNC: 2.41 MG/DL (ref 0.57–1)
CREAT UR-MCNC: 67.8 MG/DL
DEPRECATED RDW RBC AUTO: 40 FL (ref 37–54)
EGFRCR SERPLBLD CKD-EPI 2021: 19.9 ML/MIN/1.73
EOSINOPHIL # BLD AUTO: 0.43 10*3/MM3 (ref 0–0.4)
EOSINOPHIL NFR BLD AUTO: 4.6 % (ref 0.3–6.2)
ERYTHROCYTE [DISTWIDTH] IN BLOOD BY AUTOMATED COUNT: 12.1 % (ref 12.3–15.4)
GLOBULIN UR ELPH-MCNC: 3 GM/DL
GLUCOSE SERPL-MCNC: 104 MG/DL (ref 65–99)
GLUCOSE UR STRIP-MCNC: NEGATIVE MG/DL
HCT VFR BLD AUTO: 34.9 % (ref 34–46.6)
HDLC SERPL-MCNC: 39 MG/DL (ref 40–60)
HGB BLD-MCNC: 11.6 G/DL (ref 12–15.9)
HGB UR QL STRIP.AUTO: NEGATIVE
IMM GRANULOCYTES # BLD AUTO: 0.03 10*3/MM3 (ref 0–0.05)
IMM GRANULOCYTES NFR BLD AUTO: 0.3 % (ref 0–0.5)
KETONES UR QL STRIP: NEGATIVE
LDLC SERPL CALC-MCNC: 59 MG/DL (ref 0–100)
LDLC/HDLC SERPL: 1.38 {RATIO}
LEUKOCYTE ESTERASE UR QL STRIP.AUTO: ABNORMAL
LYMPHOCYTES # BLD AUTO: 2.92 10*3/MM3 (ref 0.7–3.1)
LYMPHOCYTES NFR BLD AUTO: 31 % (ref 19.6–45.3)
MCH RBC QN AUTO: 30.3 PG (ref 26.6–33)
MCHC RBC AUTO-ENTMCNC: 33.2 G/DL (ref 31.5–35.7)
MCV RBC AUTO: 91.1 FL (ref 79–97)
MONOCYTES # BLD AUTO: 0.81 10*3/MM3 (ref 0.1–0.9)
MONOCYTES NFR BLD AUTO: 8.6 % (ref 5–12)
NEUTROPHILS NFR BLD AUTO: 5.17 10*3/MM3 (ref 1.7–7)
NEUTROPHILS NFR BLD AUTO: 55 % (ref 42.7–76)
NITRITE UR QL STRIP: NEGATIVE
NRBC BLD AUTO-RTO: 0 /100 WBC (ref 0–0.2)
PH UR STRIP.AUTO: 5.5 [PH] (ref 5–8)
PHOSPHATE SERPL-MCNC: 4 MG/DL (ref 2.5–4.5)
PLATELET # BLD AUTO: 240 10*3/MM3 (ref 140–450)
PMV BLD AUTO: 10.1 FL (ref 6–12)
POTASSIUM SERPL-SCNC: 5 MMOL/L (ref 3.5–5.2)
PROT ?TM UR-MCNC: 24.7 MG/DL
PROT SERPL-MCNC: 7 G/DL (ref 6–8.5)
PROT UR QL STRIP: ABNORMAL
PROT/CREAT UR: 0.36 MG/G{CREAT}
RBC # BLD AUTO: 3.83 10*6/MM3 (ref 3.77–5.28)
SODIUM SERPL-SCNC: 137 MMOL/L (ref 136–145)
SP GR UR STRIP: 1.01 (ref 1–1.03)
TRIGL SERPL-MCNC: 176 MG/DL (ref 0–150)
UROBILINOGEN UR QL STRIP: ABNORMAL
VLDLC SERPL-MCNC: 30 MG/DL (ref 5–40)
WBC NRBC COR # BLD: 9.41 10*3/MM3 (ref 3.4–10.8)

## 2023-01-27 PROCEDURE — 36415 COLL VENOUS BLD VENIPUNCTURE: CPT

## 2023-01-27 PROCEDURE — 85025 COMPLETE CBC W/AUTO DIFF WBC: CPT

## 2023-01-27 PROCEDURE — 81001 URINALYSIS AUTO W/SCOPE: CPT

## 2023-01-27 PROCEDURE — 84100 ASSAY OF PHOSPHORUS: CPT

## 2023-01-27 PROCEDURE — 84156 ASSAY OF PROTEIN URINE: CPT

## 2023-01-27 PROCEDURE — 82570 ASSAY OF URINE CREATININE: CPT

## 2023-01-27 PROCEDURE — 80053 COMPREHEN METABOLIC PANEL: CPT

## 2023-01-27 PROCEDURE — 80061 LIPID PANEL: CPT

## 2023-01-28 LAB
BACTERIA UR QL AUTO: ABNORMAL /HPF
HYALINE CASTS UR QL AUTO: ABNORMAL /LPF
RBC # UR STRIP: ABNORMAL /HPF
REF LAB TEST METHOD: ABNORMAL
SQUAMOUS #/AREA URNS HPF: ABNORMAL /HPF
WBC # UR STRIP: ABNORMAL /HPF

## 2023-02-02 ENCOUNTER — TRANSCRIBE ORDERS (OUTPATIENT)
Dept: VASCULAR SURGERY | Facility: HOSPITAL | Age: 83
End: 2023-02-02
Payer: MEDICARE

## 2023-02-02 DIAGNOSIS — N18.4 CHRONIC KIDNEY DISEASE, STAGE IV (SEVERE): Primary | ICD-10-CM

## 2023-02-03 ENCOUNTER — OFFICE VISIT (OUTPATIENT)
Dept: CARDIOLOGY | Facility: CLINIC | Age: 83
End: 2023-02-03
Payer: MEDICARE

## 2023-02-03 VITALS
HEART RATE: 75 BPM | WEIGHT: 186.6 LBS | DIASTOLIC BLOOD PRESSURE: 72 MMHG | BODY MASS INDEX: 29.29 KG/M2 | SYSTOLIC BLOOD PRESSURE: 136 MMHG | HEIGHT: 67 IN

## 2023-02-03 DIAGNOSIS — I25.10 CORONARY ARTERY DISEASE INVOLVING NATIVE HEART WITHOUT ANGINA PECTORIS, UNSPECIFIED VESSEL OR LESION TYPE: Primary | ICD-10-CM

## 2023-02-03 DIAGNOSIS — E78.2 MIXED HYPERLIPIDEMIA: ICD-10-CM

## 2023-02-03 DIAGNOSIS — I10 HYPERTENSION, ESSENTIAL: ICD-10-CM

## 2023-02-03 PROCEDURE — 99214 OFFICE O/P EST MOD 30 MIN: CPT | Performed by: FAMILY MEDICINE

## 2023-02-03 NOTE — PROGRESS NOTES
Chief Complaint  Coronary Artery Disease, Hypertension, Hyperlipidemia, and Follow-up    Subjective        History of Present Illness  Mercedes Felipe presents to [facility] CARDIOLOGY   Ms. Felipe is an 82-year-old female patient coming in today for routine cardiac follow-up.  She was previously under the care of Dr. Ramsey, who was transferred to the heart failure clinic, and will need to establish with another physician in our practice.  She reports to me she is doing well overall and in her normal state of health.  She does chronically have shortness of breath, and uses home oxygen.  She has no complaints of chest pains, palpitations, dizziness or lower extremity swelling.  She reports good compliance with her medications.  Blood pressure in office today is 136/72.        PMH  Past Medical History:   Diagnosis Date   • Ankle pain    • Arthritis    • Asthma    • CAD (coronary artery disease)    • CKD (chronic kidney disease) stage 4, GFR 15-29 ml/min ([facility]) 9/21/2021   • COPD (chronic obstructive pulmonary disease) ([facility])    • Coronary artery disease 1/1/2007    Coronary artery disease with 100% occluded right coronary artery with collaterals demonstrated on cardiac catheterization in 2007;   • Diabetes ([facility]) 01/07/2019   • Essential hypertension    • Fracture    • Gout    • High cholesterol    • History of emphysema    • Hypertension, essential 9/21/2021   • Hyperthyroidism    • Myocardial infarction ([facility])    • Numbness in feet    • Other specified hypothyroidism 9/21/2021   • Rectal bleeding 02/20/2014   • Type 1 diabetes mellitus ([facility])          ALLERGY  Allergies   Allergen Reactions   • Penicillins Unknown - Low Severity   • Sulfa Antibiotics Unknown - Low Severity          SURGICALHX  Past Surgical History:   Procedure Laterality Date   • BACK SURGERY     • BREAST SURGERY     • DILATATION AND CURETTAGE     • TOTAL THYROIDECTOMY     • TUBAL ABDOMINAL LIGATION            SOC  Social History      Socioeconomic History   • Marital status:    Tobacco Use   • Smoking status: Former     Packs/day: 1.50     Years: 30.00     Pack years: 45.00     Types: Cigarettes   • Smokeless tobacco: Never   Vaping Use   • Vaping Use: Never used   Substance and Sexual Activity   • Alcohol use: Never   • Drug use: Never         FAMHX  Family History   Problem Relation Age of Onset   • Heart disease Mother    • Heart disease Father    • Diabetes Sister    • Diabetes Brother    • Stroke Other    • Other Other         Spine Problems    • Heart attack Other    • Breast cancer Daughter           MEDSIGONLY  Current Outpatient Medications on File Prior to Visit   Medication Sig   • albuterol sulfate  (90 Base) MCG/ACT inhaler Inhale 2 puffs Every 4 (Four) Hours As Needed for Wheezing.   • aspirin 81 MG EC tablet Take 81 mg by mouth Daily.   • calcitriol (ROCALTROL) 0.25 MCG capsule Take 0.25 mcg by mouth Daily.   • carvedilol (COREG) 25 MG tablet Take 1 tablet by mouth 2 (Two) Times a Day.   • Casanthranol-Docusate Sodium (LAXATIVE PLUS STOOL SOFTENER PO) Take  by mouth.   • cetirizine (zyrTEC) 10 MG tablet Take 10 mg by mouth Daily.   • Dulaglutide (Trulicity) 3 MG/0.5ML solution pen-injector Inject  under the skin into the appropriate area as directed.   • febuxostat (ULORIC) 40 MG tablet Take 40 mg by mouth Daily.   • Fluticasone-Umeclidin-Vilant (TRELEGY) 100-62.5-25 MCG/INH inhaler Inhale 1 puff Daily.   • folic acid (FOLVITE) 1 MG tablet Take 1 mg by mouth Daily.   • furosemide (LASIX) 40 MG tablet Take 40 mg by mouth Daily.   • glimepiride (AMARYL) 2 MG tablet Take 2 mg by mouth Every Morning Before Breakfast.   • insulin glargine (Lantus) 100 UNIT/ML injection Inject  under the skin into the appropriate area as directed Daily.   • Krill Oil 500 MG capsule Take  by mouth.   • levothyroxine (SYNTHROID, LEVOTHROID) 50 MCG tablet Take 50 mcg by mouth Daily.   • liothyronine (CYTOMEL) 5 MCG tablet 5 mcg. Take 2  "tablet in the AM and 1 tablet q pm   • magnesium oxide (MAGOX) 400 (241.3 Mg) MG tablet tablet Take 400 mg by mouth Daily.   • nitroglycerin (NITROSTAT) 0.4 MG SL tablet Place 0.4 mg under the tongue Every 5 (Five) Minutes As Needed for Chest Pain. Take no more than 3 doses in 15 minutes.   • rosuvastatin (CRESTOR) 40 MG tablet TAKE ONE TABLET BY MOUTH EVERY DAY   • vitamin D3 125 MCG (5000 UT) capsule capsule Take 5,000 Units by mouth Daily.   • acetaminophen (TYLENOL) 500 MG tablet Take 500 mg by mouth Every 6 (Six) Hours As Needed for Mild Pain .     No current facility-administered medications on file prior to visit.       REVIEW OF SYSTEMS  Review of Systems   Constitutional: Positive for fatigue. Negative for activity change and chills.   Respiratory: Positive for shortness of breath. Negative for cough and chest tightness.    Cardiovascular: Negative for chest pain, palpitations and leg swelling.   Gastrointestinal: Negative for nausea and vomiting.   Skin: Negative for rash.   Neurological: Negative for dizziness, syncope and light-headedness.        Objective   Vitals:    02/03/23 1315 02/03/23 1355   BP: 162/75 136/72   Pulse: 75    Weight: 84.6 kg (186 lb 9.6 oz)    Height: 170.2 cm (67\")          Physical Exam  Constitutional:       General: She is awake. She is not in acute distress.     Appearance: Normal appearance.   Eyes:      General: No scleral icterus.     Conjunctiva/sclera: Conjunctivae normal.   Neck:      Vascular: No carotid bruit or JVD.   Cardiovascular:      Rate and Rhythm: Normal rate and regular rhythm.      Chest Wall: PMI is displaced.      Heart sounds: Normal heart sounds, S1 normal and S2 normal. No murmur heard.    No friction rub. No gallop.   Pulmonary:      Effort: Pulmonary effort is normal.      Breath sounds: Normal breath sounds. No wheezing, rhonchi or rales.   Abdominal:      General: Bowel sounds are normal. There is no distension.      Palpations: Abdomen is soft. "   Musculoskeletal:         General: Normal range of motion.      Right lower leg: No edema.      Left lower leg: No edema.   Skin:     General: Skin is warm and dry.   Neurological:      Mental Status: She is alert and oriented to person, place, and time.         Result Review     The following data was reviewed by CAS Paz on 02/19/23.    No results found for: PROBNP  CMP    CMP 8/11/22 11/8/22 1/27/23   Glucose 90 210 (A) 104 (A)   BUN 45 (A) 42 (A) 52 (A)   Creatinine 2.63 (A) 2.53 (A) 2.41 (A)   eGFR 17.9 (A) 18.7 (A) 19.9 (A)   Sodium 139 137 137   Potassium 4.7 5.8 (A) 5.0   Chloride 101 104 102   Calcium 9.4 9.5 9.5   Total Protein   7.0   Albumin 4.00 3.90 4.0   Globulin   3.0   Total Bilirubin   0.3   Alkaline Phosphatase   73   AST (SGOT)   12   ALT (SGPT)   8   Albumin/Globulin Ratio   1.3   BUN/Creatinine Ratio 17.1 16.6 21.6   Anion Gap 13.0 10.0 11.0   (A) Abnormal value       Comments are available for some flowsheets but are not being displayed.           CBC w/diff    CBC w/Diff 8/11/22 11/8/22 1/27/23   WBC 8.83 7.65 9.41   RBC 3.42 (A) 3.71 (A) 3.83   Hemoglobin 10.2 (A) 11.6 (A) 11.6 (A)   Hematocrit 31.1 (A) 33.9 (A) 34.9   MCV 90.9 91.4 91.1   MCH 29.8 31.3 30.3   MCHC 32.8 34.2 33.2   RDW 13.7 12.8 12.1 (A)   Platelets 235 196 240   Neutrophil Rel % 57.6 54.5 55.0   Immature Granulocyte Rel % 0.3 0.3 0.3   Lymphocyte Rel % 26.7 29.8 31.0   Monocyte Rel % 8.5 8.0 8.6   Eosinophil Rel % 6.0 6.5 (A) 4.6   Basophil Rel % 0.9 0.9 0.5   (A) Abnormal value             Lab Results   Component Value Date    TSH 2.070 09/15/2021      Lab Results   Component Value Date    FREET4 0.89 (L) 09/15/2021        Magnesium   Date Value Ref Range Status   03/08/2022 2.0 1.6 - 2.4 mg/dL Final        Lipid Panel    Lipid Panel 3/8/22 1/27/23   Total Cholesterol 124 128   Triglycerides 164 (A) 176 (A)   HDL Cholesterol 43 39 (A)   VLDL Cholesterol 28 30   LDL Cholesterol  53 59   LDL/HDL Ratio 1.12 1.38    (A) Abnormal value            Echocardiogram      Feb. 26, 2021  CONCLUSION:  1.   Left ventricular chamber size is normal. The left ventricular wall thickness is increased with mild left        ventricular hypertrophy present. The overall left ventricular systolic function is normal with an estimated EF        of 65%. No significant regional wall motion abnormalities are identified.   2.  Left ventricular diastolic dysfunction.  3.  Pulmonic regurgitation with estimated pulmonary artery diastolic pressure of 13-18  mmHg.          Assessment and Plan   Diagnoses and all orders for this visit:    1. Coronary artery disease involving native heart without angina pectoris, unspecified vessel or lesion type (Primary)  Assessment & Plan:  Stable, no anginal complaints.  Continue daily aspirin.  She has sublingual nitroglycerin available if needed, she has not had to use this recently.      2. Hypertension, essential  Assessment & Plan:  Blood pressures well controlled.  Continue current medication management.  Currently not on any ACE/ARB therapy due to CKD, and she routinely follows with nephrology.      3. Mixed hyperlipidemia  Assessment & Plan:  LDL at goal, most recent level 59.  Continue current statin therapy.              Follow Up   Return in about 6 months (around 8/3/2023) for with new MD d/t Dr Brett block.    Patient was given instructions and counseling regarding her condition or for health maintenance advice. Please see specific information pulled into the AVS if appropriate.     Renee Membreno, APRN  02/19/23  13:42 EST    Dictated Utilizing Dragon Dictation

## 2023-02-20 NOTE — ASSESSMENT & PLAN NOTE
Blood pressures well controlled.  Continue current medication management.  Currently not on any ACE/ARB therapy due to CKD, and she routinely follows with nephrology.

## 2023-02-20 NOTE — ASSESSMENT & PLAN NOTE
Stable, no anginal complaints.  Continue daily aspirin.  She has sublingual nitroglycerin available if needed, she has not had to use this recently.

## 2023-03-06 ENCOUNTER — OFFICE VISIT (OUTPATIENT)
Dept: VASCULAR SURGERY | Facility: HOSPITAL | Age: 83
End: 2023-03-06
Payer: MEDICARE

## 2023-03-06 ENCOUNTER — HOSPITAL ENCOUNTER (OUTPATIENT)
Dept: CARDIOLOGY | Facility: HOSPITAL | Age: 83
Discharge: HOME | End: 2023-03-06
Payer: MEDICARE

## 2023-03-06 VITALS
RESPIRATION RATE: 18 BRPM | HEART RATE: 71 BPM | HEIGHT: 67 IN | TEMPERATURE: 97.6 F | BODY MASS INDEX: 29.19 KG/M2 | DIASTOLIC BLOOD PRESSURE: 78 MMHG | SYSTOLIC BLOOD PRESSURE: 140 MMHG | WEIGHT: 186 LBS | OXYGEN SATURATION: 99 %

## 2023-03-06 DIAGNOSIS — N18.4 STAGE 4 CHRONIC KIDNEY DISEASE: Primary | ICD-10-CM

## 2023-03-06 DIAGNOSIS — N18.4 CHRONIC KIDNEY DISEASE, STAGE IV (SEVERE): ICD-10-CM

## 2023-03-06 LAB
BH CV VAS MEAS BASILIC ANTECUBITAL FOSSA LEFT: 0.32 CM
BH CV VAS MEAS BASILIC ANTECUBITAL FOSSA RIGHT: 0.31 CM
BH CV VAS MEAS BASILIC FOREARM LEFT - DIST: 0.16 CM
BH CV VAS MEAS BASILIC FOREARM LEFT - MID: 0.21 CM
BH CV VAS MEAS BASILIC FOREARM LEFT - PROX: 0.19 CM
BH CV VAS MEAS BASILIC FOREARM RIGHT - DIST: 0.17 CM
BH CV VAS MEAS BASILIC FOREARM RIGHT - MID: 0.22 CM
BH CV VAS MEAS BASILIC FOREARM RIGHT - PROX: 0.26 CM
BH CV VAS MEAS BASILIC UPPER ARM LEFT - DIST: 0.37 CM
BH CV VAS MEAS BASILIC UPPER ARM LEFT - MID: 0.37 CM
BH CV VAS MEAS BASILIC UPPER ARM LEFT - PROX: 0.43 CM
BH CV VAS MEAS BASILIC UPPER ARM RIGHT - DIST: 0.27 CM
BH CV VAS MEAS BASILIC UPPER ARM RIGHT - MID: 0.33 CM
BH CV VAS MEAS BASILIC UPPER ARM RIGHT - PROX: 0.35 CM
BH CV VAS MEAS CEPHALIC ANTECUBITAL FOSSA LEFT: 0.43 CM
BH CV VAS MEAS CEPHALIC ANTECUBITAL FOSSA RIGHT: 0.52 CM
BH CV VAS MEAS CEPHALIC FOREARM LEFT - DIST: 0.18 CM
BH CV VAS MEAS CEPHALIC FOREARM LEFT - MID: 0.16 CM
BH CV VAS MEAS CEPHALIC FOREARM LEFT - PROX: 0.25 CM
BH CV VAS MEAS CEPHALIC FOREARM RIGHT - DIST: 0.16 CM
BH CV VAS MEAS CEPHALIC FOREARM RIGHT - MID: 0.19 CM
BH CV VAS MEAS CEPHALIC FOREARM RIGHT - PROX: 0.23 CM
BH CV VAS MEAS CEPHALIC UPPER ARM LEFT - DIST: 0.41 CM
BH CV VAS MEAS CEPHALIC UPPER ARM LEFT - MID: 0.42 CM
BH CV VAS MEAS CEPHALIC UPPER ARM LEFT - PROX: 0.35 CM
BH CV VAS MEAS CEPHALIC UPPER ARM RIGHT - DIST: 0.36 CM
BH CV VAS MEAS CEPHALIC UPPER ARM RIGHT - MID: 0.32 CM
BH CV VAS MEAS CEPHALIC UPPER ARM RIGHT - PROX: 0.35 CM
MAXIMAL PREDICTED HEART RATE: 140 BPM
STRESS TARGET HR: 119 BPM

## 2023-03-06 PROCEDURE — 93985 DUP-SCAN HEMO COMPL BI STD: CPT | Performed by: SURGERY

## 2023-03-06 PROCEDURE — 99204 OFFICE O/P NEW MOD 45 MIN: CPT | Performed by: SURGERY

## 2023-03-06 PROCEDURE — 93985 DUP-SCAN HEMO COMPL BI STD: CPT

## 2023-03-06 NOTE — PROGRESS NOTES
[facility]   HISTORY AND PHYSICAL    Patient Name: Mercedes Felipe  : 1940  MRN: [id number]  Primary Care Physician:  Rito Barahona MD  Date of admission: (Not on file)    Subjective   Subjective     Chief Complaint: Need for permanent access for hemodialysis    HPI:    Mercedes Felipe is a 82 y.o. female who follows up with nephrology and has been advised that she should start planning for permanent access.  She has severe renal insufficiency and is approaching a point where she could end up needing dialysis.  She comes to see me to discuss about permanent access.  She is not certain whether she wants to proceed with dialysis although she is leaning towards having the fistula done either way just in case.  She had 2 daughters who had to go on dialysis and she saw the changes in the lives.  She is not sure given her age whether she wants to go for quality or quantity.  She is right-handed.    Review of Systems    Non contributory except for the History of Present Illness    Personal History     Past Medical History:   Diagnosis Date   • Ankle pain    • Arthritis    • Asthma    • CAD (coronary artery disease)    • CKD (chronic kidney disease) stage 4, GFR 15-29 ml/min ([facility]) 2021   • COPD (chronic obstructive pulmonary disease) ([facility])    • Coronary artery disease 2007    Coronary artery disease with 100% occluded right coronary artery with collaterals demonstrated on cardiac catheterization in ;   • Diabetes ([facility]) 2019   • Essential hypertension    • Fracture    • Gout    • High cholesterol    • History of emphysema    • Hypertension, essential 2021   • Hyperthyroidism    • Myocardial infarction ([facility])    • Numbness in feet    • Other specified hypothyroidism 2021   • Rectal bleeding 2014   • Type 1 diabetes mellitus ([facility])        Past Surgical History:   Procedure Laterality Date   • BACK SURGERY     • BREAST SURGERY     • DILATATION AND CURETTAGE     • TOTAL  THYROIDECTOMY     • TUBAL ABDOMINAL LIGATION         Family History: family history includes Breast cancer in her daughter; Diabetes in her brother and sister; Heart attack in an other family member; Heart disease in her father and mother; Other in an other family member; Stroke in an other family member. Otherwise pertinent FHx was reviewed and not pertinent to current issue.    Social History:  reports that she has quit smoking. Her smoking use included cigarettes. She has a 45.00 pack-year smoking history. She has never used smokeless tobacco. She reports that she does not drink alcohol and does not use drugs.    Home Medications:  Current Outpatient Medications on File Prior to Visit   Medication Sig   • acetaminophen (TYLENOL) 500 MG tablet Take 1 tablet by mouth Every 6 (Six) Hours As Needed for Mild Pain.   • albuterol sulfate  (90 Base) MCG/ACT inhaler Inhale 2 puffs Every 4 (Four) Hours As Needed for Wheezing.   • aspirin 81 MG EC tablet Take 1 tablet by mouth Daily.   • calcitriol (ROCALTROL) 0.25 MCG capsule Take 1 capsule by mouth Daily.   • carvedilol (COREG) 25 MG tablet Take 1 tablet by mouth 2 (Two) Times a Day.   • Casanthranol-Docusate Sodium (LAXATIVE PLUS STOOL SOFTENER PO) Take  by mouth.   • cetirizine (zyrTEC) 10 MG tablet Take 1 tablet by mouth Daily.   • Dulaglutide (Trulicity) 3 MG/0.5ML solution pen-injector Inject  under the skin into the appropriate area as directed.   • febuxostat (ULORIC) 40 MG tablet Take 1 tablet by mouth Daily.   • Fluticasone-Umeclidin-Vilant (TRELEGY) 100-62.5-25 MCG/INH inhaler Inhale 1 puff Daily.   • folic acid (FOLVITE) 1 MG tablet Take 1 tablet by mouth Daily.   • furosemide (LASIX) 40 MG tablet Take 1 tablet by mouth Daily.   • glimepiride (AMARYL) 2 MG tablet Take 1 tablet by mouth Every Morning Before Breakfast.   • insulin glargine (Lantus) 100 UNIT/ML injection Inject  under the skin into the appropriate area as directed Daily.   • Krill Oil 500  MG capsule Take  by mouth.   • levothyroxine (SYNTHROID, LEVOTHROID) 50 MCG tablet Take 1 tablet by mouth Daily.   • liothyronine (CYTOMEL) 5 MCG tablet 1 tablet. Take 2 tablet in the AM and 1 tablet q pm   • magnesium oxide (MAGOX) 400 (241.3 Mg) MG tablet tablet Take 1 tablet by mouth Daily.   • nitroglycerin (NITROSTAT) 0.4 MG SL tablet Place 1 tablet under the tongue Every 5 (Five) Minutes As Needed for Chest Pain. Take no more than 3 doses in 15 minutes.   • rosuvastatin (CRESTOR) 40 MG tablet TAKE ONE TABLET BY MOUTH EVERY DAY   • vitamin D3 125 MCG (5000 UT) capsule capsule Take 1 capsule by mouth Daily.     No current facility-administered medications on file prior to visit.          Allergies:  Allergies   Allergen Reactions   • Penicillins Unknown - Low Severity   • Sulfa Antibiotics Unknown - Low Severity       Objective   Objective     Vitals:   Temp:  [97.6 °F (36.4 °C)] 97.6 °F (36.4 °C)  Heart Rate:  [71] 71  Resp:  [18] 18  BP: (140)/(78) 140/78    Physical Exam   General: Alert, no acute distress.  Neck: Supple, no bruits  Heart: Regular rate  Lungs: Clear  Abdomen: Benign  Extremities: Symmetric  Pulses: +2 left radial pulse.    Diagnostic studies:   Vein mapping in the office today demonstrates satisfactory bilateral upper arm cephalic veins as well as basilic veins.    Assessment & Plan   Assessment / Plan     Active Hospital Problems:  There are no active hospital problems to display for this patient.      Diagnoses and all orders for this visit:    1. Stage 4 chronic kidney disease (HCC) (Primary)        Assessment/plan:   Mrs. Felipe is approaching the need for dialysis.  She is in need for permanent access for hemodialysis assuming she decides to proceed with dialysis when the time comes.  The plan would be for creation of a left brachiocephalic arteriovenous fistula.  I have discussed with her in detail the mechanics of the procedure, the indications, benefits, risks, alternatives, as well  as potential complications to include but not limited to infection, bleeding, reoperation, failure of the fistula to develop.  She appears to understand.  She will think about it and let us know whether she wants to proceed.      Electronically signed by Rajesh Murray MD, 03/06/23, 9:13 AM EST.

## 2023-03-10 ENCOUNTER — PATIENT ROUNDING (BHMG ONLY) (OUTPATIENT)
Dept: VASCULAR SURGERY | Facility: HOSPITAL | Age: 83
End: 2023-03-10
Payer: MEDICARE

## 2023-03-10 NOTE — PROGRESS NOTES
PT WAS NEW TO OUR CLINIC ON 3/6/23. SHE STATES EVERYTHING WENT FINE DURING HER VISIT. SHE DID CONFIRM SHE WAS GOING TO CALL BACK TO SCHEDULE HER OUTPT SURGERY AND I VERIFIED SHE HAD THE PHONE NUMBER TO CALL US.

## 2023-04-05 ENCOUNTER — PREP FOR SURGERY (OUTPATIENT)
Dept: OTHER | Facility: HOSPITAL | Age: 83
End: 2023-04-05
Payer: MEDICARE

## 2023-04-05 DIAGNOSIS — N18.4 STAGE 4 CHRONIC KIDNEY DISEASE: Primary | ICD-10-CM

## 2023-04-05 RX ORDER — CEFAZOLIN SODIUM 2 G/100ML
2 INJECTION, SOLUTION INTRAVENOUS ONCE
OUTPATIENT
Start: 2023-04-05 | End: 2023-04-05

## 2023-04-21 NOTE — PRE-PROCEDURE INSTRUCTIONS
Patient instructed to have no food past midnight, clears up to 2 hours prior to arrival time. Patient instructed to wear no lotions, jewelry or piercing's day of surgery. Patient to shower with surgical soap am of surgery.  patient to take coreg, trelegy, uloric, leothyroxine, liothyronine am of surgery.

## 2023-04-24 PROCEDURE — S0260 H&P FOR SURGERY: HCPCS | Performed by: SURGERY

## 2023-04-24 PROCEDURE — 36821 AV FUSION DIRECT ANY SITE: CPT

## 2023-04-24 NOTE — H&P
[facility]   HISTORY AND PHYSICAL    Patient Name: Mercedes Felipe  : 1940  MRN: [id number]  Primary Care Physician:  Rito Barahona MD  Date of admission: (Not on file)    Subjective   Subjective     Chief Complaint: Need for permanent access for hemodialysis    HPI:    Mercedes Felipe is a 82 y.o. female in need for permanent access for hemodialysis.    Review of Systems    Non contributory except for the History of Present Illness    Personal History     Past Medical History:   Diagnosis Date   • Ankle pain    • Arthritis    • Asthma    • CKD (chronic kidney disease) stage 4, GFR 15-29 ml/min 2021   • COPD (chronic obstructive pulmonary disease)    • Coronary artery disease 2007    Coronary artery disease with 100% occluded right coronary artery with collaterals demonstrated on cardiac catheterization in ;   • Diabetes 2019   • Essential hypertension    • Fracture    • Gout    • High cholesterol    • History of emphysema    • Hypertension, essential 2021   • Hyperthyroidism    • Myocardial infarction    • Numbness in feet    • Other specified hypothyroidism 2021   • Rectal bleeding 2014   • Type 1 diabetes mellitus        Past Surgical History:   Procedure Laterality Date   • BACK SURGERY     • BREAST SURGERY     • DILATATION AND CURETTAGE     • TOTAL THYROIDECTOMY Left    • TUBAL ABDOMINAL LIGATION         Family History: family history includes Breast cancer in her daughter; Diabetes in her brother and sister; Heart attack in an other family member; Heart disease in her father and mother; Other in an other family member; Stroke in an other family member. Otherwise pertinent FHx was reviewed and not pertinent to current issue.    Social History:  reports that she has quit smoking. Her smoking use included cigarettes. She has a 45.00 pack-year smoking history. She has never used smokeless tobacco. She reports current alcohol use. She reports that she  does not use drugs.    Home Medications:  No current facility-administered medications on file prior to encounter.     Current Outpatient Medications on File Prior to Encounter   Medication Sig   • acetaminophen (TYLENOL) 500 MG tablet Take 1 tablet by mouth Every 6 (Six) Hours As Needed for Mild Pain.   • albuterol sulfate  (90 Base) MCG/ACT inhaler Inhale 2 puffs Every 4 (Four) Hours As Needed for Wheezing.   • aspirin 81 MG EC tablet Take 1 tablet by mouth Daily.   • calcitriol (ROCALTROL) 0.25 MCG capsule Take 1 capsule by mouth Daily.   • carvedilol (COREG) 25 MG tablet Take 1 tablet by mouth 2 (Two) Times a Day.   • Casanthranol-Docusate Sodium (LAXATIVE PLUS STOOL SOFTENER PO) Take  by mouth.   • cetirizine (zyrTEC) 10 MG tablet Take 1 tablet by mouth Daily.   • Dulaglutide (Trulicity) 3 MG/0.5ML solution pen-injector Inject  under the skin into the appropriate area as directed Every 7 (Seven) Days.   • febuxostat (ULORIC) 40 MG tablet Take 1 tablet by mouth Daily.   • Fluticasone-Umeclidin-Vilant (TRELEGY) 100-62.5-25 MCG/INH inhaler Inhale 1 puff Daily.   • folic acid (FOLVITE) 1 MG tablet Take 1 tablet by mouth Daily.   • furosemide (LASIX) 40 MG tablet Take 1 tablet by mouth Daily.   • glimepiride (AMARYL) 2 MG tablet Take 1 tablet by mouth Every Morning Before Breakfast.   • insulin glargine (Lantus) 100 UNIT/ML injection Inject 46 Units under the skin into the appropriate area as directed Every Night.   • Krill Oil 500 MG capsule Take  by mouth.   • levothyroxine (SYNTHROID, LEVOTHROID) 50 MCG tablet Take 1 tablet by mouth Daily.   • liothyronine (CYTOMEL) 5 MCG tablet 1 tablet. Take 2 tablet in the AM and 1 tablet q pm   • magnesium oxide (MAGOX) 400 (241.3 Mg) MG tablet tablet Take 1 tablet by mouth Daily.   • nitroglycerin (NITROSTAT) 0.4 MG SL tablet Place 1 tablet under the tongue Every 5 (Five) Minutes As Needed for Chest Pain. Take no more than 3 doses in 15 minutes.   • rosuvastatin  (CRESTOR) 40 MG tablet TAKE ONE TABLET BY MOUTH EVERY DAY   • vitamin D3 125 MCG (5000 UT) capsule capsule Take 1 capsule by mouth Daily.          Allergies:  Allergies   Allergen Reactions   • Penicillins Unknown - Low Severity   • Sulfa Antibiotics Unknown - Low Severity       Objective   Objective     Vitals:        Physical Exam   General: Alert, no acute distress.  Neck: Supple, no bruits  Heart: Regular rate  Lungs: Clear  Abdomen: Benign  Extremities: Symmetric  Pulses: +2 left radial pulse.     Diagnostic studies:   Vein mapping in the office today demonstrates satisfactory bilateral upper arm cephalic veins as well as basilic veins.    Assessment & Plan   Assessment / Plan     Active Hospital Problems:  Active Hospital Problems    Diagnosis    • **CKD (chronic kidney disease) stage 4, GFR 15-29 ml/min        There are no diagnoses linked to this encounter.    Assessment/plan:   Mrs. Felipe is approaching the need for dialysis.  She is in need for permanent access for hemodialysis assuming she decides to proceed with dialysis when the time comes.  The plan would be for creation of a left brachiocephalic arteriovenous fistula.  I have discussed with her in detail the mechanics of the procedure, the indications, benefits, risks, alternatives, as well as potential complications to include but not limited to infection, bleeding, reoperation, failure of the fistula to develop.  She appears to understand.  She will think about it and let us know whether she wants to proceed.      Electronically signed by Rajesh Murray MD, 04/24/23, 10:13 AM EDT.

## 2023-04-25 ENCOUNTER — HOSPITAL ENCOUNTER (OUTPATIENT)
Facility: HOSPITAL | Age: 83
Setting detail: HOSPITAL OUTPATIENT SURGERY
Discharge: HOME | End: 2023-04-25
Attending: SURGERY | Admitting: SURGERY
Payer: MEDICARE

## 2023-04-25 ENCOUNTER — LAB (OUTPATIENT)
Dept: LAB | Facility: HOSPITAL | Age: 83
End: 2023-04-25
Payer: MEDICARE

## 2023-04-25 ENCOUNTER — ANESTHESIA (OUTPATIENT)
Dept: PERIOP | Facility: HOSPITAL | Age: 83
End: 2023-04-25
Payer: MEDICARE

## 2023-04-25 ENCOUNTER — ANESTHESIA EVENT (OUTPATIENT)
Dept: PERIOP | Facility: HOSPITAL | Age: 83
End: 2023-04-25
Payer: MEDICARE

## 2023-04-25 ENCOUNTER — TRANSCRIBE ORDERS (OUTPATIENT)
Dept: ADMINISTRATIVE | Facility: HOSPITAL | Age: 83
End: 2023-04-25
Payer: MEDICARE

## 2023-04-25 ENCOUNTER — TRANSCRIBE ORDERS (OUTPATIENT)
Dept: PERIOP | Facility: HOSPITAL | Age: 83
End: 2023-04-25
Payer: MEDICARE

## 2023-04-25 VITALS
HEIGHT: 67 IN | SYSTOLIC BLOOD PRESSURE: 110 MMHG | WEIGHT: 189.15 LBS | HEART RATE: 85 BPM | RESPIRATION RATE: 18 BRPM | TEMPERATURE: 97 F | DIASTOLIC BLOOD PRESSURE: 62 MMHG | OXYGEN SATURATION: 95 % | BODY MASS INDEX: 29.69 KG/M2

## 2023-04-25 DIAGNOSIS — N18.4 CHRONIC KIDNEY DISEASE, STAGE IV (SEVERE): Primary | ICD-10-CM

## 2023-04-25 DIAGNOSIS — E55.9 VITAMIN D DEFICIENCY: ICD-10-CM

## 2023-04-25 DIAGNOSIS — N18.4 CHRONIC KIDNEY DISEASE, STAGE IV (SEVERE): ICD-10-CM

## 2023-04-25 DIAGNOSIS — N18.4 STAGE 4 CHRONIC KIDNEY DISEASE: ICD-10-CM

## 2023-04-25 LAB
ALBUMIN SERPL-MCNC: 4 G/DL (ref 3.5–5.2)
ANION GAP SERPL CALCULATED.3IONS-SCNC: 12.2 MMOL/L (ref 5–15)
ANION GAP SERPL CALCULATED.3IONS-SCNC: 12.2 MMOL/L (ref 5–15)
BACTERIA UR QL AUTO: ABNORMAL /HPF
BASOPHILS # BLD AUTO: 0.09 10*3/MM3 (ref 0–0.2)
BASOPHILS NFR BLD AUTO: 0.9 % (ref 0–1.5)
BILIRUB UR QL STRIP: NEGATIVE
BUN SERPL-MCNC: 58 MG/DL (ref 8–23)
BUN SERPL-MCNC: 58 MG/DL (ref 8–23)
BUN/CREAT SERPL: 20.1 (ref 7–25)
BUN/CREAT SERPL: 20.1 (ref 7–25)
CALCIUM SPEC-SCNC: 8.9 MG/DL (ref 8.6–10.5)
CALCIUM SPEC-SCNC: 8.9 MG/DL (ref 8.6–10.5)
CHLORIDE SERPL-SCNC: 103 MMOL/L (ref 98–107)
CHLORIDE SERPL-SCNC: 103 MMOL/L (ref 98–107)
CLARITY UR: ABNORMAL
CO2 SERPL-SCNC: 23.8 MMOL/L (ref 22–29)
CO2 SERPL-SCNC: 23.8 MMOL/L (ref 22–29)
COLOR UR: YELLOW
CREAT SERPL-MCNC: 2.88 MG/DL (ref 0.57–1)
CREAT SERPL-MCNC: 2.88 MG/DL (ref 0.57–1)
CREAT UR-MCNC: 73.8 MG/DL
DEPRECATED RDW RBC AUTO: 42.7 FL (ref 37–54)
EGFRCR SERPLBLD CKD-EPI 2021: 16 ML/MIN/1.73
EGFRCR SERPLBLD CKD-EPI 2021: 16 ML/MIN/1.73
EOSINOPHIL # BLD AUTO: 0.73 10*3/MM3 (ref 0–0.4)
EOSINOPHIL NFR BLD AUTO: 7.3 % (ref 0.3–6.2)
ERYTHROCYTE [DISTWIDTH] IN BLOOD BY AUTOMATED COUNT: 12.7 % (ref 12.3–15.4)
GLUCOSE BLDC GLUCOMTR-MCNC: 153 MG/DL (ref 70–99)
GLUCOSE SERPL-MCNC: 116 MG/DL (ref 65–99)
GLUCOSE SERPL-MCNC: 116 MG/DL (ref 65–99)
GLUCOSE UR STRIP-MCNC: NEGATIVE MG/DL
GRAN CASTS URNS QL MICRO: ABNORMAL /LPF
HCT VFR BLD AUTO: 34.5 % (ref 34–46.6)
HGB BLD-MCNC: 11.6 G/DL (ref 12–15.9)
HGB UR QL STRIP.AUTO: ABNORMAL
HYALINE CASTS UR QL AUTO: ABNORMAL /LPF
IMM GRANULOCYTES # BLD AUTO: 0.04 10*3/MM3 (ref 0–0.05)
IMM GRANULOCYTES NFR BLD AUTO: 0.4 % (ref 0–0.5)
KETONES UR QL STRIP: NEGATIVE
LEUKOCYTE ESTERASE UR QL STRIP.AUTO: ABNORMAL
LYMPHOCYTES # BLD AUTO: 2.67 10*3/MM3 (ref 0.7–3.1)
LYMPHOCYTES NFR BLD AUTO: 26.8 % (ref 19.6–45.3)
MCH RBC QN AUTO: 31.2 PG (ref 26.6–33)
MCHC RBC AUTO-ENTMCNC: 33.6 G/DL (ref 31.5–35.7)
MCV RBC AUTO: 92.7 FL (ref 79–97)
MONOCYTES # BLD AUTO: 0.88 10*3/MM3 (ref 0.1–0.9)
MONOCYTES NFR BLD AUTO: 8.8 % (ref 5–12)
NEUTROPHILS NFR BLD AUTO: 5.54 10*3/MM3 (ref 1.7–7)
NEUTROPHILS NFR BLD AUTO: 55.8 % (ref 42.7–76)
NITRITE UR QL STRIP: NEGATIVE
NRBC BLD AUTO-RTO: 0 /100 WBC (ref 0–0.2)
PH UR STRIP.AUTO: <=5 [PH] (ref 5–8)
PHOSPHATE SERPL-MCNC: 4.1 MG/DL (ref 2.5–4.5)
PLATELET # BLD AUTO: 217 10*3/MM3 (ref 140–450)
PMV BLD AUTO: 10.1 FL (ref 6–12)
POTASSIUM SERPL-SCNC: 4.3 MMOL/L (ref 3.5–5.2)
POTASSIUM SERPL-SCNC: 4.3 MMOL/L (ref 3.5–5.2)
PROT ?TM UR-MCNC: 35.8 MG/DL
PROT UR QL STRIP: ABNORMAL
PROT/CREAT UR: 0.49 MG/G{CREAT}
PTH-INTACT SERPL-MCNC: 210.6 PG/ML (ref 15–65)
QT INTERVAL: 381 MS
RBC # BLD AUTO: 3.72 10*6/MM3 (ref 3.77–5.28)
RBC # UR STRIP: ABNORMAL /HPF
REF LAB TEST METHOD: ABNORMAL
SODIUM SERPL-SCNC: 139 MMOL/L (ref 136–145)
SODIUM SERPL-SCNC: 139 MMOL/L (ref 136–145)
SP GR UR STRIP: 1.01 (ref 1–1.03)
SQUAMOUS #/AREA URNS HPF: ABNORMAL /HPF
UROBILINOGEN UR QL STRIP: ABNORMAL
WBC # UR STRIP: ABNORMAL /HPF
WBC NRBC COR # BLD: 9.95 10*3/MM3 (ref 3.4–10.8)

## 2023-04-25 PROCEDURE — 82306 VITAMIN D 25 HYDROXY: CPT

## 2023-04-25 PROCEDURE — 25010000002 HEPARIN (PORCINE) PER 1000 UNITS: Performed by: NURSE ANESTHETIST, CERTIFIED REGISTERED

## 2023-04-25 PROCEDURE — 25010000002 DEXAMETHASONE PER 1 MG: Performed by: NURSE ANESTHETIST, CERTIFIED REGISTERED

## 2023-04-25 PROCEDURE — 25010000002 MIDAZOLAM PER 1MG: Performed by: ANESTHESIOLOGY

## 2023-04-25 PROCEDURE — 85025 COMPLETE CBC W/AUTO DIFF WBC: CPT | Performed by: SURGERY

## 2023-04-25 PROCEDURE — 80048 BASIC METABOLIC PNL TOTAL CA: CPT | Performed by: SURGERY

## 2023-04-25 PROCEDURE — 25010000002 CEFAZOLIN IN DEXTROSE 2-4 GM/100ML-% SOLUTION: Performed by: SURGERY

## 2023-04-25 PROCEDURE — 36415 COLL VENOUS BLD VENIPUNCTURE: CPT

## 2023-04-25 PROCEDURE — 93005 ELECTROCARDIOGRAM TRACING: CPT | Performed by: ANESTHESIOLOGY

## 2023-04-25 PROCEDURE — 84156 ASSAY OF PROTEIN URINE: CPT

## 2023-04-25 PROCEDURE — 83970 ASSAY OF PARATHORMONE: CPT

## 2023-04-25 PROCEDURE — 25010000002 HEPARIN (PORCINE) PER 1000 UNITS: Performed by: SURGERY

## 2023-04-25 PROCEDURE — 25010000002 PROPOFOL 10 MG/ML EMULSION: Performed by: NURSE ANESTHETIST, CERTIFIED REGISTERED

## 2023-04-25 PROCEDURE — 82962 GLUCOSE BLOOD TEST: CPT

## 2023-04-25 PROCEDURE — 0 LIDOCAINE 1 % SOLUTION: Performed by: SURGERY

## 2023-04-25 PROCEDURE — 36821 AV FUSION DIRECT ANY SITE: CPT | Performed by: SURGERY

## 2023-04-25 PROCEDURE — 81001 URINALYSIS AUTO W/SCOPE: CPT

## 2023-04-25 PROCEDURE — 82570 ASSAY OF URINE CREATININE: CPT

## 2023-04-25 PROCEDURE — 25010000002 ONDANSETRON PER 1 MG: Performed by: NURSE ANESTHETIST, CERTIFIED REGISTERED

## 2023-04-25 PROCEDURE — 80069 RENAL FUNCTION PANEL: CPT | Performed by: INTERNAL MEDICINE

## 2023-04-25 DEVICE — LIGACLIP MCA MULTIPLE CLIP APPLIERS, 20 SMALL CLIPS
Type: IMPLANTABLE DEVICE | Site: ARM | Status: FUNCTIONAL
Brand: LIGACLIP

## 2023-04-25 DEVICE — LIGACLIP MCA MULTIPLE CLIP APPLIERS, 20 MEDIUM CLIPS
Type: IMPLANTABLE DEVICE | Site: ARM | Status: FUNCTIONAL
Brand: LIGACLIP

## 2023-04-25 RX ORDER — DEXMEDETOMIDINE HYDROCHLORIDE 100 UG/ML
INJECTION, SOLUTION INTRAVENOUS AS NEEDED
Status: DISCONTINUED | OUTPATIENT
Start: 2023-04-25 | End: 2023-04-25 | Stop reason: SURG

## 2023-04-25 RX ORDER — ACETAMINOPHEN 500 MG
1000 TABLET ORAL ONCE
Status: CANCELLED | OUTPATIENT
Start: 2023-04-25 | End: 2023-04-25

## 2023-04-25 RX ORDER — PROMETHAZINE HYDROCHLORIDE 25 MG/1
25 SUPPOSITORY RECTAL ONCE AS NEEDED
Status: DISCONTINUED | OUTPATIENT
Start: 2023-04-25 | End: 2023-04-25 | Stop reason: HOSPADM

## 2023-04-25 RX ORDER — BUPIVACAINE HYDROCHLORIDE 5 MG/ML
INJECTION, SOLUTION EPIDURAL; INTRACAUDAL AS NEEDED
Status: DISCONTINUED | OUTPATIENT
Start: 2023-04-25 | End: 2023-04-25 | Stop reason: HOSPADM

## 2023-04-25 RX ORDER — ONDANSETRON 2 MG/ML
4 INJECTION INTRAMUSCULAR; INTRAVENOUS ONCE AS NEEDED
Status: DISCONTINUED | OUTPATIENT
Start: 2023-04-25 | End: 2023-04-25 | Stop reason: HOSPADM

## 2023-04-25 RX ORDER — SODIUM CHLORIDE 9 MG/ML
9 INJECTION, SOLUTION INTRAVENOUS CONTINUOUS
Status: CANCELLED | OUTPATIENT
Start: 2023-04-25

## 2023-04-25 RX ORDER — PROMETHAZINE HYDROCHLORIDE 12.5 MG/1
25 TABLET ORAL ONCE AS NEEDED
Status: DISCONTINUED | OUTPATIENT
Start: 2023-04-25 | End: 2023-04-25 | Stop reason: HOSPADM

## 2023-04-25 RX ORDER — SODIUM CHLORIDE 9 MG/ML
9 INJECTION, SOLUTION INTRAVENOUS CONTINUOUS PRN
Status: DISCONTINUED | OUTPATIENT
Start: 2023-04-25 | End: 2023-04-25 | Stop reason: HOSPADM

## 2023-04-25 RX ORDER — PROPOFOL 10 MG/ML
VIAL (ML) INTRAVENOUS AS NEEDED
Status: DISCONTINUED | OUTPATIENT
Start: 2023-04-25 | End: 2023-04-25 | Stop reason: SURG

## 2023-04-25 RX ORDER — LIDOCAINE HYDROCHLORIDE 20 MG/ML
INJECTION, SOLUTION EPIDURAL; INFILTRATION; INTRACAUDAL; PERINEURAL AS NEEDED
Status: DISCONTINUED | OUTPATIENT
Start: 2023-04-25 | End: 2023-04-25 | Stop reason: SURG

## 2023-04-25 RX ORDER — LIDOCAINE HYDROCHLORIDE 10 MG/ML
INJECTION, SOLUTION INFILTRATION; PERINEURAL AS NEEDED
Status: DISCONTINUED | OUTPATIENT
Start: 2023-04-25 | End: 2023-04-25 | Stop reason: HOSPADM

## 2023-04-25 RX ORDER — DEXAMETHASONE SODIUM PHOSPHATE 4 MG/ML
INJECTION, SOLUTION INTRA-ARTICULAR; INTRALESIONAL; INTRAMUSCULAR; INTRAVENOUS; SOFT TISSUE AS NEEDED
Status: DISCONTINUED | OUTPATIENT
Start: 2023-04-25 | End: 2023-04-25 | Stop reason: SURG

## 2023-04-25 RX ORDER — ONDANSETRON 2 MG/ML
INJECTION INTRAMUSCULAR; INTRAVENOUS AS NEEDED
Status: DISCONTINUED | OUTPATIENT
Start: 2023-04-25 | End: 2023-04-25 | Stop reason: SURG

## 2023-04-25 RX ORDER — KETAMINE HCL IN NACL, ISO-OSM 100MG/10ML
SYRINGE (ML) INJECTION AS NEEDED
Status: DISCONTINUED | OUTPATIENT
Start: 2023-04-25 | End: 2023-04-25 | Stop reason: SURG

## 2023-04-25 RX ORDER — ACETAMINOPHEN 500 MG
1000 TABLET ORAL ONCE
Status: COMPLETED | OUTPATIENT
Start: 2023-04-25 | End: 2023-04-25

## 2023-04-25 RX ORDER — OXYCODONE HYDROCHLORIDE 5 MG/1
5 TABLET ORAL
Status: DISCONTINUED | OUTPATIENT
Start: 2023-04-25 | End: 2023-04-25 | Stop reason: HOSPADM

## 2023-04-25 RX ORDER — MIDAZOLAM HYDROCHLORIDE 2 MG/2ML
2 INJECTION, SOLUTION INTRAMUSCULAR; INTRAVENOUS
Status: CANCELLED | OUTPATIENT
Start: 2023-04-25

## 2023-04-25 RX ORDER — VASOPRESSIN 20 U/ML
INJECTION PARENTERAL AS NEEDED
Status: DISCONTINUED | OUTPATIENT
Start: 2023-04-25 | End: 2023-04-25 | Stop reason: SURG

## 2023-04-25 RX ORDER — CEFAZOLIN SODIUM 2 G/100ML
2 INJECTION, SOLUTION INTRAVENOUS ONCE
Status: COMPLETED | OUTPATIENT
Start: 2023-04-25 | End: 2023-04-25

## 2023-04-25 RX ORDER — HEPARIN SODIUM 1000 [USP'U]/ML
INJECTION, SOLUTION INTRAVENOUS; SUBCUTANEOUS AS NEEDED
Status: DISCONTINUED | OUTPATIENT
Start: 2023-04-25 | End: 2023-04-25 | Stop reason: SURG

## 2023-04-25 RX ORDER — MIDAZOLAM HYDROCHLORIDE 2 MG/2ML
2 INJECTION, SOLUTION INTRAMUSCULAR; INTRAVENOUS ONCE
Status: COMPLETED | OUTPATIENT
Start: 2023-04-25 | End: 2023-04-25

## 2023-04-25 RX ORDER — OXYCODONE HYDROCHLORIDE AND ACETAMINOPHEN 5; 325 MG/1; MG/1
1 TABLET ORAL EVERY 6 HOURS PRN
Qty: 20 TABLET | Refills: 0 | Status: SHIPPED | OUTPATIENT
Start: 2023-04-25

## 2023-04-25 RX ORDER — PHENYLEPHRINE HCL IN 0.9% NACL 1 MG/10 ML
SYRINGE (ML) INTRAVENOUS AS NEEDED
Status: DISCONTINUED | OUTPATIENT
Start: 2023-04-25 | End: 2023-04-25 | Stop reason: SURG

## 2023-04-25 RX ADMIN — VASOPRESSIN 2 UNITS: 20 INJECTION INTRAVENOUS at 07:42

## 2023-04-25 RX ADMIN — MIDAZOLAM HYDROCHLORIDE 2 MG: 1 INJECTION, SOLUTION INTRAMUSCULAR; INTRAVENOUS at 07:19

## 2023-04-25 RX ADMIN — Medication 200 MCG: at 08:28

## 2023-04-25 RX ADMIN — HEPARIN SODIUM 3000 UNITS: 1000 INJECTION INTRAVENOUS; SUBCUTANEOUS at 07:59

## 2023-04-25 RX ADMIN — VASOPRESSIN 2 UNITS: 20 INJECTION INTRAVENOUS at 08:12

## 2023-04-25 RX ADMIN — VASOPRESSIN 2 UNITS: 20 INJECTION INTRAVENOUS at 07:47

## 2023-04-25 RX ADMIN — CEFAZOLIN SODIUM 2 G: 2 INJECTION, SOLUTION INTRAVENOUS at 07:36

## 2023-04-25 RX ADMIN — Medication 200 MCG: at 08:22

## 2023-04-25 RX ADMIN — Medication 100 MCG: at 07:40

## 2023-04-25 RX ADMIN — Medication 200 MCG: at 08:10

## 2023-04-25 RX ADMIN — DEXAMETHASONE SODIUM PHOSPHATE 4 MG: 4 INJECTION, SOLUTION INTRA-ARTICULAR; INTRALESIONAL; INTRAMUSCULAR; INTRAVENOUS; SOFT TISSUE at 07:35

## 2023-04-25 RX ADMIN — PROPOFOL 100 MG: 10 INJECTION, EMULSION INTRAVENOUS at 07:33

## 2023-04-25 RX ADMIN — ONDANSETRON 4 MG: 2 INJECTION INTRAMUSCULAR; INTRAVENOUS at 07:35

## 2023-04-25 RX ADMIN — Medication 10 MG: at 07:46

## 2023-04-25 RX ADMIN — Medication 200 MCG: at 07:47

## 2023-04-25 RX ADMIN — LIDOCAINE HYDROCHLORIDE 80 MG: 20 INJECTION, SOLUTION EPIDURAL; INFILTRATION; INTRACAUDAL; PERINEURAL at 07:33

## 2023-04-25 RX ADMIN — Medication 200 MCG: at 07:44

## 2023-04-25 RX ADMIN — VASOPRESSIN 2 UNITS: 20 INJECTION INTRAVENOUS at 08:00

## 2023-04-25 RX ADMIN — DEXMEDETOMIDINE HYDROCHLORIDE 8 MCG: 100 INJECTION, SOLUTION, CONCENTRATE INTRAVENOUS at 07:37

## 2023-04-25 RX ADMIN — SODIUM CHLORIDE 9 ML/HR: 9 INJECTION, SOLUTION INTRAVENOUS at 07:18

## 2023-04-25 RX ADMIN — ACETAMINOPHEN 1000 MG: 500 TABLET ORAL at 07:19

## 2023-04-25 NOTE — OP NOTE
ARTERIOVENOUS FISTULA FORMATION  Procedure Report    Patient Name:  Mercedes Felipe  YOB: 1940    Date of Surgery:  4/25/2023     Indications: Need for permanent access for hemodialysis    Pre-op Diagnosis:   Stage 4 chronic kidney disease [N18.4]       Post-Op Diagnosis Codes:     * Stage 4 chronic kidney disease [N18.4]    Procedure(s):  Creation of left arm arteriovenous fistula    Staff:  Surgeon(s):  Rajesh Murray MD    Assistant: Chelle Pulido RN CSA    Anesthesia: General    Estimated Blood Loss: 10 mL    Implants:    Implant Name Type Inv. Item Serial No.  Lot No. LRB No. Used Action   CLIPAPPLR M/ ENDO LIGACLIP 20CLP 11IN MD - [id number] Implant CLIPAPPLR M/ ENDO LIGACLIP 20CLP 11IN MD  [employer]  [employer] [id number] Left 1 Implanted   CLIPAPPLR M/ ENDO LIGACLIP 9 3/8IN  - [id number] Implant CLIPAPPLR M/ ENDO LIGACLIP 9 3/8IN   [employer]  [employer] J [employer] J [id number] Left 1 Implanted       Specimen: None       Findings: Palpable thrill upon completion of the procedure.  Doppler examination reveals a pulsatile signal with high amplitude continuous diastolic flow.    Complications: None    Description of Procedure: The patient was brought into the operating room and was placed in the supine position.  She was then induced into general anesthesia via LMA.  She was then positioned with the left arm extended on an armboard.  She was then prepped and draped in the usual aseptic fashion.  A stockinette was applied to the hand and forearm and was secured in place using Coban.  A timeout was taken to confirm patient, procedure and laterality.  Local anesthesia was then administered at the projected site of incision.  An incision was then made in a transverse fashion over the distal left upper arm approximately 1 fingerbreadth above the antecubital crease.  The subcutaneous tissue was traversed using electrocautery.  Blunt and sharp dissection were then used to  dissect the cephalic vein.  This was dissected approximately 2 to 3 cm distal and proximal to the incision.  The vein was marked with a marker to avoid twisting.  The distal end was then clipped and the vein transected.  The vein was then flushed with heparinized saline and probed with a #3 dilator with no resistance.  It was controlled with a bulldog.  Blunt and sharp dissection were then used to dissected the brachial artery.  A segment of approximately 2 cm was freed.  Proximal and distal control was obtained using Vesseloops.  The patient was given systemic anticoagulation in the form of heparin 3000 units IV.  3 minutes were allowed for the heparin to circulate.  The brachial artery was then clamped distally and proximally.  A longitudinal arteriotomy was made using an 11 blade and extended using Mendoza scissors.  The total length of the arteriotomy was 6 mm.  The end of the vein was then fashioned to meet the dimensions of the arteriotomy.  An anastomosis was then created using 6-0 Prolene in a running fashion.  Just prior to completion of the anastomosis all vessels were bled.  The anastomosis was completed.  Doppler examination revealed the above-mentioned findings.  The wound was inspected for hemostasis and hemostasis assured.  The wound was then approximated using 3-0 Vicryl in a running fashion for approximation of the dermis and subcutaneous tissue.  Dermabond was then applied to the wound.  The patient tolerated the procedure well.         Assistant: Chelle Pulido RN CSA  was responsible for performing the following activities: First assist and their skilled assistance was necessary for the success of this case.    Rajesh Murray MD     Date: 4/25/2023  Time: 08:31 EDT

## 2023-04-25 NOTE — ANESTHESIA POSTPROCEDURE EVALUATION
Patient: Mercedes Felipe    Procedure Summary     Date: 04/25/23 Room / Location: [facility] OR 01 / [facility] MAIN OR    Anesthesia Start: 0726 Anesthesia Stop: 0847    Procedure: Creation of left arm arteriovenous fistula (Left) Diagnosis:       Stage 4 chronic kidney disease      (Stage 4 chronic kidney disease [N18.4])    Surgeons: Rajesh Murray MD Provider: Jean Marie Mendez MD    Anesthesia Type: MAC ASA Status: 4          Anesthesia Type: MAC    Vitals  Vitals Value Taken Time   /41 04/25/23 0913   Temp 36.1 °C (96.9 °F) 04/25/23 0840   Pulse 83 04/25/23 0920   Resp 20 04/25/23 0920   SpO2 93 % 04/25/23 0920   Vitals shown include unvalidated device data.        Post Anesthesia Care and Evaluation    Patient location during evaluation: PACU  Patient participation: complete - patient participated  Level of consciousness: awake and alert  Pain management: adequate    Airway patency: patent  Anesthetic complications: No anesthetic complications  PONV Status: none  Cardiovascular status: acceptable  Respiratory status: acceptable  Hydration status: acceptable    Comments: I or my partners:   - Performed the pre-anesthetic examination and evaluation  - Formulated and discussed the anesthetic plan  - Personally participated in the most demanding procedures of the anesthesia plan, including vascular access, placement of neuraxial and nerve blocks, induction, airway management, positioning, and emergence  - Ensured that all procedures of the anesthetic plan were performed by qualified personnel  - Monitored the course of the anesthetic at frequent intervals  - Remained physically present and available throughout the anesthetic for immediate diagnosis and treatment of emergencies  - Provided the necessary post-anesthetic care

## 2023-04-25 NOTE — DISCHARGE INSTRUCTIONS
May wash area in 2 days.  Follow-up in the office in 2 weeks, call for appointment.  Resume home diet.  Resume home medications.  No lifting greater than 15 pounds for 2 weeks.

## 2023-04-26 LAB — 25(OH)D3 SERPL-MCNC: 51.8 NG/ML (ref 30–100)

## 2023-04-26 RX ORDER — NITROGLYCERIN 0.4 MG/1
TABLET SUBLINGUAL
Qty: 25 TABLET | Refills: 1 | Status: SHIPPED | OUTPATIENT
Start: 2023-04-26

## 2023-04-26 RX ORDER — CARVEDILOL 25 MG/1
25 TABLET ORAL 2 TIMES DAILY
Qty: 180 TABLET | Refills: 3 | Status: SHIPPED | OUTPATIENT
Start: 2023-04-26

## 2023-05-10 ENCOUNTER — OFFICE VISIT (OUTPATIENT)
Dept: VASCULAR SURGERY | Facility: HOSPITAL | Age: 83
End: 2023-05-10
Payer: MEDICARE

## 2023-05-10 VITALS
TEMPERATURE: 97.5 F | DIASTOLIC BLOOD PRESSURE: 62 MMHG | HEART RATE: 77 BPM | OXYGEN SATURATION: 97 % | SYSTOLIC BLOOD PRESSURE: 110 MMHG | RESPIRATION RATE: 18 BRPM

## 2023-05-10 DIAGNOSIS — N18.4 STAGE 4 CHRONIC KIDNEY DISEASE: Primary | ICD-10-CM

## 2023-05-10 PROCEDURE — G0463 HOSPITAL OUTPT CLINIC VISIT: HCPCS | Performed by: SURGERY

## 2023-05-10 RX ORDER — INSULIN GLARGINE AND LIXISENATIDE 100; 33 U/ML; UG/ML
INJECTION, SOLUTION SUBCUTANEOUS
COMMUNITY
Start: 2023-04-03

## 2023-05-10 NOTE — PROGRESS NOTES
[facility]   Follow up Office    Patient Name: Mercedes Felipe  : 1940  MRN: [id number]  Primary Care Physician:  Rito Barahona MD      Subjective   Subjective     HPI:    Mercedes Felipe is a 82 y.o. female here for follow-up after creation of left arm fistula/.  Having a little bit of numbness in the hand.  No pain, no motor dysfunction.      Objective     Vitals:   Temp:  [97.5 °F (36.4 °C)] 97.5 °F (36.4 °C)  Heart Rate:  [77] 77  Resp:  [18] 18  BP: (110)/(62) 110/62    Physical Exam      General: Alert, no acute distress  Wound: Healing well, no signs of infection.  Neuro: Grossly normal motor function of the left hand.  AV fistula: Excellent bruit and thrill.    Assessment & Plan   Assessment / Plan     Diagnoses and all orders for this visit:    1. Stage 4 chronic kidney disease (Primary)       Assessment/Plan:   Status post creation of left arm arteriovenous fistula with patent fistula which appears to be developing well, mild steal syndrome.  Exercise.  Follow-up in 1 month.        Electronically signed by Rajesh Murray MD, 05/10/23, 1:06 PM EDT.

## 2023-06-12 ENCOUNTER — OFFICE VISIT (OUTPATIENT)
Dept: VASCULAR SURGERY | Facility: HOSPITAL | Age: 83
End: 2023-06-12
Payer: MEDICARE

## 2023-06-12 VITALS
SYSTOLIC BLOOD PRESSURE: 138 MMHG | OXYGEN SATURATION: 96 % | TEMPERATURE: 97.6 F | DIASTOLIC BLOOD PRESSURE: 62 MMHG | RESPIRATION RATE: 18 BRPM | HEART RATE: 83 BPM

## 2023-06-12 DIAGNOSIS — N18.4 STAGE 4 CHRONIC KIDNEY DISEASE: Primary | ICD-10-CM

## 2023-06-12 PROCEDURE — G0463 HOSPITAL OUTPT CLINIC VISIT: HCPCS | Performed by: NURSE PRACTITIONER

## 2023-06-12 PROCEDURE — 99024 POSTOP FOLLOW-UP VISIT: CPT | Performed by: NURSE PRACTITIONER

## 2023-06-12 PROCEDURE — 3075F SYST BP GE 130 - 139MM HG: CPT | Performed by: NURSE PRACTITIONER

## 2023-06-12 PROCEDURE — 3078F DIAST BP <80 MM HG: CPT | Performed by: NURSE PRACTITIONER

## 2023-06-13 PROBLEM — N18.4 STAGE 4 CHRONIC KIDNEY DISEASE: Status: ACTIVE | Noted: 2021-09-21

## 2023-06-13 NOTE — PROGRESS NOTES
Patient arrived in the clinic for an evaluation of left arm fistula. Fistula was positive for strong thrill and bruit, no signs of redness or swelling. Surgical site clean, dry, intact. Patient to follow up in one month.

## 2023-06-14 ENCOUNTER — TELEPHONE (OUTPATIENT)
Dept: CARDIOLOGY | Facility: CLINIC | Age: 83
End: 2023-06-14
Payer: MEDICARE

## 2023-06-14 RX ORDER — ROSUVASTATIN CALCIUM 40 MG/1
40 TABLET, COATED ORAL DAILY
Qty: 90 TABLET | Refills: 3 | Status: SHIPPED | OUTPATIENT
Start: 2023-06-14

## 2023-06-14 NOTE — TELEPHONE ENCOUNTER
The [facility] received a fax that requires your attention. The document has been indexed to the patient’s chart for your review.      Reason for sending: EXTERNAL MED REC NOTIFICATION    Documents Description: REFILL REQUEST FOR ROSUVASTATIN CALCIUM 40MG    Name of Sender: [facility]    Date Indexed: 06.14.2023    Notes (if needed): N/A

## 2023-07-28 ENCOUNTER — LAB (OUTPATIENT)
Dept: LAB | Facility: HOSPITAL | Age: 83
End: 2023-07-28
Payer: MEDICARE

## 2023-07-28 ENCOUNTER — TRANSCRIBE ORDERS (OUTPATIENT)
Dept: LAB | Facility: HOSPITAL | Age: 83
End: 2023-07-28
Payer: MEDICARE

## 2023-07-28 DIAGNOSIS — E21.1 SECONDARY HYPERPARATHYROIDISM, NON-RENAL: ICD-10-CM

## 2023-07-28 DIAGNOSIS — N18.4 CHRONIC KIDNEY DISEASE, STAGE IV (SEVERE): Primary | ICD-10-CM

## 2023-07-28 DIAGNOSIS — N18.4 CHRONIC KIDNEY DISEASE, STAGE IV (SEVERE): ICD-10-CM

## 2023-07-28 LAB
ALBUMIN SERPL-MCNC: 3.7 G/DL (ref 3.5–5.2)
ANION GAP SERPL CALCULATED.3IONS-SCNC: 11.4 MMOL/L (ref 5–15)
BACTERIA UR QL AUTO: ABNORMAL /HPF
BASOPHILS # BLD AUTO: 0.09 10*3/MM3 (ref 0–0.2)
BASOPHILS NFR BLD AUTO: 1 % (ref 0–1.5)
BILIRUB UR QL STRIP: NEGATIVE
BUN SERPL-MCNC: 50 MG/DL (ref 8–23)
BUN/CREAT SERPL: 18.3 (ref 7–25)
CALCIUM SPEC-SCNC: 8.8 MG/DL (ref 8.6–10.5)
CHLORIDE SERPL-SCNC: 104 MMOL/L (ref 98–107)
CLARITY UR: CLEAR
CO2 SERPL-SCNC: 22.6 MMOL/L (ref 22–29)
COLOR UR: YELLOW
CREAT SERPL-MCNC: 2.73 MG/DL (ref 0.57–1)
CREAT UR-MCNC: 126.8 MG/DL
DEPRECATED RDW RBC AUTO: 42.7 FL (ref 37–54)
EGFRCR SERPLBLD CKD-EPI 2021: 17 ML/MIN/1.73
EOSINOPHIL # BLD AUTO: 0.82 10*3/MM3 (ref 0–0.4)
EOSINOPHIL NFR BLD AUTO: 9.2 % (ref 0.3–6.2)
ERYTHROCYTE [DISTWIDTH] IN BLOOD BY AUTOMATED COUNT: 12.4 % (ref 12.3–15.4)
GLUCOSE SERPL-MCNC: 211 MG/DL (ref 65–99)
GLUCOSE UR STRIP-MCNC: ABNORMAL MG/DL
GRAN CASTS URNS QL MICRO: ABNORMAL /LPF
HCT VFR BLD AUTO: 33.2 % (ref 34–46.6)
HGB BLD-MCNC: 10.9 G/DL (ref 12–15.9)
HGB UR QL STRIP.AUTO: NEGATIVE
HYALINE CASTS UR QL AUTO: ABNORMAL /LPF
IMM GRANULOCYTES # BLD AUTO: 0.04 10*3/MM3 (ref 0–0.05)
IMM GRANULOCYTES NFR BLD AUTO: 0.4 % (ref 0–0.5)
KETONES UR QL STRIP: NEGATIVE
LEUKOCYTE ESTERASE UR QL STRIP.AUTO: ABNORMAL
LYMPHOCYTES # BLD AUTO: 2.47 10*3/MM3 (ref 0.7–3.1)
LYMPHOCYTES NFR BLD AUTO: 27.8 % (ref 19.6–45.3)
MCH RBC QN AUTO: 31.2 PG (ref 26.6–33)
MCHC RBC AUTO-ENTMCNC: 32.8 G/DL (ref 31.5–35.7)
MCV RBC AUTO: 95.1 FL (ref 79–97)
MONOCYTES # BLD AUTO: 0.7 10*3/MM3 (ref 0.1–0.9)
MONOCYTES NFR BLD AUTO: 7.9 % (ref 5–12)
NEUTROPHILS NFR BLD AUTO: 4.77 10*3/MM3 (ref 1.7–7)
NEUTROPHILS NFR BLD AUTO: 53.7 % (ref 42.7–76)
NITRITE UR QL STRIP: NEGATIVE
NRBC BLD AUTO-RTO: 0 /100 WBC (ref 0–0.2)
PH UR STRIP.AUTO: 5.5 [PH] (ref 5–8)
PHOSPHATE SERPL-MCNC: 4.2 MG/DL (ref 2.5–4.5)
PLATELET # BLD AUTO: 191 10*3/MM3 (ref 140–450)
PMV BLD AUTO: 10.2 FL (ref 6–12)
POTASSIUM SERPL-SCNC: 5.1 MMOL/L (ref 3.5–5.2)
PROT ?TM UR-MCNC: 36.6 MG/DL
PROT UR QL STRIP: ABNORMAL
PROT/CREAT UR: 0.29 MG/G{CREAT}
PTH-INTACT SERPL-MCNC: 193 PG/ML (ref 15–65)
RBC # BLD AUTO: 3.49 10*6/MM3 (ref 3.77–5.28)
RBC # UR STRIP: ABNORMAL /HPF
REF LAB TEST METHOD: ABNORMAL
SODIUM SERPL-SCNC: 138 MMOL/L (ref 136–145)
SP GR UR STRIP: 1.02 (ref 1–1.03)
SQUAMOUS #/AREA URNS HPF: ABNORMAL /HPF
UROBILINOGEN UR QL STRIP: ABNORMAL
WBC # UR STRIP: ABNORMAL /HPF
WBC NRBC COR # BLD: 8.89 10*3/MM3 (ref 3.4–10.8)

## 2023-07-28 PROCEDURE — 85025 COMPLETE CBC W/AUTO DIFF WBC: CPT

## 2023-07-28 PROCEDURE — 82570 ASSAY OF URINE CREATININE: CPT

## 2023-07-28 PROCEDURE — 83970 ASSAY OF PARATHORMONE: CPT

## 2023-07-28 PROCEDURE — 84156 ASSAY OF PROTEIN URINE: CPT

## 2023-07-28 PROCEDURE — 80069 RENAL FUNCTION PANEL: CPT

## 2023-07-28 PROCEDURE — 81001 URINALYSIS AUTO W/SCOPE: CPT

## 2023-07-28 PROCEDURE — 36415 COLL VENOUS BLD VENIPUNCTURE: CPT

## 2023-08-24 ENCOUNTER — OFFICE VISIT (OUTPATIENT)
Dept: CARDIOLOGY | Facility: CLINIC | Age: 83
End: 2023-08-24
Payer: MEDICARE

## 2023-08-24 VITALS
HEART RATE: 84 BPM | DIASTOLIC BLOOD PRESSURE: 65 MMHG | SYSTOLIC BLOOD PRESSURE: 149 MMHG | OXYGEN SATURATION: 98 % | WEIGHT: 188 LBS | HEIGHT: 67 IN | BODY MASS INDEX: 29.51 KG/M2

## 2023-08-24 DIAGNOSIS — I25.10 CORONARY ARTERY DISEASE INVOLVING NATIVE CORONARY ARTERY OF NATIVE HEART WITHOUT ANGINA PECTORIS: Primary | ICD-10-CM

## 2023-08-24 DIAGNOSIS — I10 HYPERTENSION, ESSENTIAL: Chronic | ICD-10-CM

## 2023-08-24 DIAGNOSIS — E78.00 HIGH CHOLESTEROL: ICD-10-CM

## 2023-08-24 PROCEDURE — 3078F DIAST BP <80 MM HG: CPT | Performed by: INTERNAL MEDICINE

## 2023-08-24 PROCEDURE — 99214 OFFICE O/P EST MOD 30 MIN: CPT | Performed by: INTERNAL MEDICINE

## 2023-08-24 PROCEDURE — 3077F SYST BP >= 140 MM HG: CPT | Performed by: INTERNAL MEDICINE

## 2023-08-24 NOTE — PROGRESS NOTES
Chief Complaint  Follow-up, Coronary Artery Disease, Hypertension, and Hyperlipidemia    Subjective    Patient has been doing well no chest pain or change in breathing capacity.  No problems with weight gain blood pressure at home has been in the 130s or less systolically  Past Medical History:   Diagnosis Date    Ankle pain     BILATERAL    Arthritis     Asthma     CKD (chronic kidney disease) stage 4, GFR 15-29 ml/min 09/21/2021    COPD (chronic obstructive pulmonary disease)     Coronary artery disease 01/01/2007    Coronary artery disease with 100% occluded right coronary artery with collaterals demonstrated on cardiac catheterization in 2007;    Essential hypertension     Fracture     RIGHT FOOT ,    Gout     High cholesterol     History of emphysema     Hyperthyroidism     Myocardial infarction     Numbness in feet     Rectal bleeding 02/20/2014    Sleep apnea     Type 1 diabetes mellitus          Current Outpatient Medications:     acetaminophen (TYLENOL) 500 MG tablet, Take 1 tablet by mouth Every 6 (Six) Hours As Needed for Mild Pain., Disp: , Rfl:     albuterol sulfate  (90 Base) MCG/ACT inhaler, Inhale 2 puffs Every 4 (Four) Hours As Needed for Wheezing., Disp: , Rfl:     aspirin 81 MG EC tablet, Take 1 tablet by mouth Daily., Disp: , Rfl:     calcitriol (ROCALTROL) 0.25 MCG capsule, Take 1 capsule by mouth Daily., Disp: , Rfl:     carvedilol (COREG) 25 MG tablet, Take 1 tablet by mouth 2 (Two) Times a Day., Disp: 180 tablet, Rfl: 3    Casanthranol-Docusate Sodium (LAXATIVE PLUS STOOL SOFTENER PO), Take  by mouth., Disp: , Rfl:     cetirizine (zyrTEC) 10 MG tablet, Take 1 tablet by mouth Daily., Disp: , Rfl:     febuxostat (ULORIC) 40 MG tablet, Take 1 tablet by mouth Daily., Disp: , Rfl:     Fluticasone-Umeclidin-Vilant (TRELEGY) 100-62.5-25 MCG/INH inhaler, Inhale 1 puff Daily., Disp: , Rfl:     folic acid (FOLVITE) 1 MG tablet, Take 1 tablet by mouth Daily., Disp: , Rfl:     furosemide (LASIX)  40 MG tablet, Take 1 tablet by mouth Daily., Disp: 90 tablet, Rfl: 1    glimepiride (AMARYL) 2 MG tablet, Take 1 tablet by mouth Every Morning Before Breakfast., Disp: , Rfl:     Krill Oil 500 MG capsule, Take  by mouth., Disp: , Rfl:     levothyroxine (SYNTHROID, LEVOTHROID) 50 MCG tablet, Take 1 tablet by mouth Daily., Disp: , Rfl:     liothyronine (CYTOMEL) 5 MCG tablet, 1 tablet. Take 2 tablet in the AM and 1 tablet q pm, Disp: , Rfl:     magnesium oxide (MAGOX) 400 (241.3 Mg) MG tablet tablet, Take 1 tablet by mouth Daily., Disp: , Rfl:     mirtazapine (REMERON SOL-TAB) 15 MG disintegrating tablet, Place 1 tablet on the tongue Every Night., Disp: , Rfl:     nitroglycerin (NITROSTAT) 0.4 MG SL tablet, PLACE ONE TABLET UNDER TONGUE AS NEEDED FOR CHEST PAIN WAIT 5 MINUTES AND REPEAT UP TO 3 TIMES IF NEEDED, Disp: 25 tablet, Rfl: 1    rosuvastatin (CRESTOR) 40 MG tablet, Take 1 tablet by mouth Daily., Disp: 90 tablet, Rfl: 3    Soliqua 100-33 UNT-MCG/ML solution pen-injector injection, INJECT 30 UNITS UNDER THE SKIN EVERY DAY, Disp: , Rfl:     vitamin D3 125 MCG (5000 UT) capsule capsule, Take 1 capsule by mouth Daily., Disp: , Rfl:     Medications Discontinued During This Encounter   Medication Reason    oxyCODONE-acetaminophen (Percocet) 5-325 MG per tablet *Therapy completed     Allergies   Allergen Reactions    Penicillins Anaphylaxis    Sulfa Antibiotics Hives        Social History     Tobacco Use    Smoking status: Former     Packs/day: 1.50     Years: 30.00     Pack years: 45.00     Types: Cigarettes     Quit date:      Years since quittin.6    Smokeless tobacco: Never   Vaping Use    Vaping Use: Never used   Substance Use Topics    Alcohol use: Yes     Comment: occasional    Drug use: Never       Family History   Problem Relation Age of Onset    Heart disease Mother     Heart disease Father     Diabetes Sister     Diabetes Brother     Breast cancer Daughter     Stroke Other     Other Other          "Spine Problems     Heart attack Other     Malig Hyperthermia Neg Hx         Objective     /65   Pulse 84   Ht 170.2 cm (67\")   Wt 85.3 kg (188 lb)   SpO2 98%   BMI 29.44 kg/mý       Physical Exam    General Appearance:   no acute distress  Alert and oriented x3  HENT:   lips not cyanotic  Atraumatic  Neck:  No jvd   supple  Respiratory:  no respiratory distress  normal breath sounds  no rales  Cardiovascular:  Regular rate and rhythm  no S3, no S4   no murmur  no rub  Extremities  No cyanosis  lower extremity edema: none    Skin:   warm, dry  No rashes      Result Review :     No results found for: PROBNP  CMP          1/27/2023    11:23 4/25/2023    06:03 7/28/2023    13:52   CMP   Glucose 104  116     116  211    BUN 52  58     58  50    Creatinine 2.41  2.88     2.88  2.73    EGFR 19.9  16.0     16.0  17.0    Sodium 137  139     139  138    Potassium 5.0  4.3     4.3  5.1    Chloride 102  103     103  104    Calcium 9.5  8.9     8.9  8.8    Total Protein 7.0      Albumin 4.0  4.0  3.7    Globulin 3.0      Total Bilirubin 0.3      Alkaline Phosphatase 73      AST (SGOT) 12      ALT (SGPT) 8      Albumin/Globulin Ratio 1.3      BUN/Creatinine Ratio 21.6  20.1     20.1  18.3    Anion Gap 11.0  12.2     12.2  11.4      CBC w/diff          1/27/2023    11:23 4/25/2023    06:03 7/28/2023    13:52   CBC w/Diff   WBC 9.41  9.95  8.89    RBC 3.83  3.72  3.49    Hemoglobin 11.6  11.6  10.9    Hematocrit 34.9  34.5  33.2    MCV 91.1  92.7  95.1    MCH 30.3  31.2  31.2    MCHC 33.2  33.6  32.8    RDW 12.1  12.7  12.4    Platelets 240  217  191    Neutrophil Rel % 55.0  55.8  53.7    Immature Granulocyte Rel % 0.3  0.4  0.4    Lymphocyte Rel % 31.0  26.8  27.8    Monocyte Rel % 8.6  8.8  7.9    Eosinophil Rel % 4.6  7.3  9.2    Basophil Rel % 0.5  0.9  1.0       Lab Results   Component Value Date    TSH 2.070 09/15/2021      Lab Results   Component Value Date    FREET4 0.89 (L) 09/15/2021      No results found for: " DDIMERQUANT  Magnesium   Date Value Ref Range Status   03/08/2022 2.0 1.6 - 2.4 mg/dL Final      No results found for: DIGOXIN   No results found for: TROPONINT        Lipid Panel          1/27/2023    11:23   Lipid Panel   Total Cholesterol 128    Triglycerides 176    HDL Cholesterol 39    VLDL Cholesterol 30    LDL Cholesterol  59    LDL/HDL Ratio 1.38      No results found for: POCTROP                   Diagnoses and all orders for this visit:    1. Coronary artery disease involving native coronary artery of native heart without angina pectoris (Primary)  Assessment & Plan:  Patient is doing well no ongoing angina continue with chronic aspirin 81 mg daily        2. Hypertension, essential  Assessment & Plan:  Blood pressure well controlled at home continue with monitoring at home log also continue with Coreg 25 twice daily      3. High cholesterol  Assessment & Plan:  Patient's LDL goal continue on Crestor 40 nightly no myalgias symptoms              Follow Up     Return in about 6 months (around 2/24/2024) for Follow with Lola Jiang.          Patient was given instructions and counseling regarding her condition or for health maintenance advice. Please see specific information pulled into the AVS if appropriate.

## 2023-08-24 NOTE — ASSESSMENT & PLAN NOTE
Bladder scan showed pt to have 013 ml.   Patient is doing well no ongoing angina continue with chronic aspirin 81 mg daily

## 2023-08-24 NOTE — ASSESSMENT & PLAN NOTE
Blood pressure well controlled at home continue with monitoring at home log also continue with Coreg 25 twice daily

## 2023-09-13 ENCOUNTER — TRANSCRIBE ORDERS (OUTPATIENT)
Dept: ADMINISTRATIVE | Facility: HOSPITAL | Age: 83
End: 2023-09-13
Payer: MEDICARE

## 2023-09-13 DIAGNOSIS — Z78.0 MENOPAUSE: Primary | ICD-10-CM

## 2023-10-10 ENCOUNTER — HOSPITAL ENCOUNTER (OUTPATIENT)
Dept: BONE DENSITY | Facility: HOSPITAL | Age: 83
Discharge: HOME | End: 2023-10-10
Admitting: FAMILY MEDICINE
Payer: MEDICARE

## 2023-10-10 DIAGNOSIS — Z78.0 MENOPAUSE: ICD-10-CM

## 2023-10-10 PROCEDURE — 77080 DXA BONE DENSITY AXIAL: CPT

## 2023-10-20 ENCOUNTER — OFFICE VISIT (OUTPATIENT)
Dept: VASCULAR SURGERY | Facility: HOSPITAL | Age: 83
End: 2023-10-20
Payer: MEDICARE

## 2023-10-20 VITALS
TEMPERATURE: 96.9 F | RESPIRATION RATE: 18 BRPM | DIASTOLIC BLOOD PRESSURE: 64 MMHG | OXYGEN SATURATION: 97 % | HEART RATE: 88 BPM | SYSTOLIC BLOOD PRESSURE: 108 MMHG

## 2023-10-20 DIAGNOSIS — N18.4 STAGE 4 CHRONIC KIDNEY DISEASE: Primary | ICD-10-CM

## 2023-10-20 PROCEDURE — G0463 HOSPITAL OUTPT CLINIC VISIT: HCPCS | Performed by: SURGERY

## 2023-10-20 NOTE — PROGRESS NOTES
[facility]   Follow up Office    Patient Name: Mercedes Felipe  : 1940  MRN: [id number]  Primary Care Physician:  Rito Barahona MD      Subjective   Subjective     HPI:    Mercedes Felipe is a 83 y.o. female here for follow-up to check on her left arm fistula.  The fistula was created on 2023.  She is not yet on dialysis.  The symptoms that she used to have of numbness during the day are now completely resolved.  Sometimes she does wake up at night with numbness and tingling and occasionally has have some pain in the hand.  It is something that she feels she can manage.  She continues to exercise her arm.      Objective     Vitals:   Temp:  [96.9 °F (36.1 °C)] 96.9 °F (36.1 °C)  Heart Rate:  [88] 88  Resp:  [18] 18  BP: (108)/(64) 108/64    Physical Exam      General: Alert, no acute distress  HEENT: PERRLA  Abdomen: Benign  Extremities: Symmetric.  Left hand: Grossly normal color, temperature, motor function.  Wound: Well-healed.  AV fistula: Excellent bruit and thrill.  Neuro: No gross deficits  Pulses: Diminished left radial.    Assessment & Plan   Assessment / Plan     Diagnoses and all orders for this visit:    1. Stage 4 chronic kidney disease (Primary)       Assessment/Plan:   Satisfactory progress following creation of left arm fistula with a functioning left arm fistula.  Mild, tolerable, steal syndrome.  Continue to exercise the left arm.  Follow-up in 6 months.        Electronically signed by Rajesh Murray MD, 10/20/23, 12:08 PM EDT.

## 2023-10-27 ENCOUNTER — TRANSCRIBE ORDERS (OUTPATIENT)
Dept: LAB | Facility: HOSPITAL | Age: 83
End: 2023-10-27
Payer: MEDICARE

## 2023-10-27 ENCOUNTER — LAB (OUTPATIENT)
Dept: LAB | Facility: HOSPITAL | Age: 83
End: 2023-10-27
Payer: MEDICARE

## 2023-10-27 DIAGNOSIS — I50.9 HEART FAILURE, UNSPECIFIED HF CHRONICITY, UNSPECIFIED HEART FAILURE TYPE: ICD-10-CM

## 2023-10-27 DIAGNOSIS — N18.4 CHRONIC KIDNEY DISEASE, STAGE IV (SEVERE): ICD-10-CM

## 2023-10-27 DIAGNOSIS — E11.22 TYPE 2 DIABETES MELLITUS WITH DIABETIC CHRONIC KIDNEY DISEASE, UNSPECIFIED CKD STAGE, UNSPECIFIED WHETHER LONG TERM INSULIN USE: ICD-10-CM

## 2023-10-27 DIAGNOSIS — I50.1 LEFT HEART FAILURE: ICD-10-CM

## 2023-10-27 DIAGNOSIS — D50.8 IRON DEFICIENCY ANEMIA SECONDARY TO INADEQUATE DIETARY IRON INTAKE: ICD-10-CM

## 2023-10-27 DIAGNOSIS — E21.1 SECONDARY HYPERPARATHYROIDISM, NON-RENAL: ICD-10-CM

## 2023-10-27 DIAGNOSIS — M10.00 IDIOPATHIC GOUT, UNSPECIFIED CHRONICITY, UNSPECIFIED SITE: ICD-10-CM

## 2023-10-27 DIAGNOSIS — N18.4 CHRONIC KIDNEY DISEASE, STAGE IV (SEVERE): Primary | ICD-10-CM

## 2023-10-27 DIAGNOSIS — E55.9 VITAMIN D DEFICIENCY: ICD-10-CM

## 2023-10-27 LAB
ALBUMIN SERPL-MCNC: 4.1 G/DL (ref 3.5–5.2)
ANION GAP SERPL CALCULATED.3IONS-SCNC: 12 MMOL/L (ref 5–15)
BASOPHILS # BLD AUTO: 0.05 10*3/MM3 (ref 0–0.2)
BASOPHILS NFR BLD AUTO: 0.7 % (ref 0–1.5)
BILIRUB UR QL STRIP: NEGATIVE
BUN SERPL-MCNC: 71 MG/DL (ref 8–23)
BUN/CREAT SERPL: 20.2 (ref 7–25)
CALCIUM SPEC-SCNC: 9.6 MG/DL (ref 8.6–10.5)
CHLORIDE SERPL-SCNC: 104 MMOL/L (ref 98–107)
CLARITY UR: ABNORMAL
CO2 SERPL-SCNC: 22 MMOL/L (ref 22–29)
COLOR UR: YELLOW
CREAT SERPL-MCNC: 3.52 MG/DL (ref 0.57–1)
CREAT UR-MCNC: 131.6 MG/DL
DEPRECATED RDW RBC AUTO: 42.3 FL (ref 37–54)
EGFRCR SERPLBLD CKD-EPI 2021: 12.5 ML/MIN/1.73
EOSINOPHIL # BLD AUTO: 0.46 10*3/MM3 (ref 0–0.4)
EOSINOPHIL NFR BLD AUTO: 6.3 % (ref 0.3–6.2)
ERYTHROCYTE [DISTWIDTH] IN BLOOD BY AUTOMATED COUNT: 12.4 % (ref 12.3–15.4)
FERRITIN SERPL-MCNC: 336 NG/ML (ref 13–150)
GLUCOSE SERPL-MCNC: 220 MG/DL (ref 65–99)
GLUCOSE UR STRIP-MCNC: ABNORMAL MG/DL
HCT VFR BLD AUTO: 33.3 % (ref 34–46.6)
HGB BLD-MCNC: 11.1 G/DL (ref 12–15.9)
HGB UR QL STRIP.AUTO: NEGATIVE
IMM GRANULOCYTES # BLD AUTO: 0.01 10*3/MM3 (ref 0–0.05)
IMM GRANULOCYTES NFR BLD AUTO: 0.1 % (ref 0–0.5)
IRON 24H UR-MRATE: 99 MCG/DL (ref 37–145)
IRON SATN MFR SERPL: 30 % (ref 20–50)
KETONES UR QL STRIP: NEGATIVE
LEUKOCYTE ESTERASE UR QL STRIP.AUTO: ABNORMAL
LYMPHOCYTES # BLD AUTO: 2.27 10*3/MM3 (ref 0.7–3.1)
LYMPHOCYTES NFR BLD AUTO: 30.9 % (ref 19.6–45.3)
MCH RBC QN AUTO: 31.2 PG (ref 26.6–33)
MCHC RBC AUTO-ENTMCNC: 33.3 G/DL (ref 31.5–35.7)
MCV RBC AUTO: 93.5 FL (ref 79–97)
MONOCYTES # BLD AUTO: 0.67 10*3/MM3 (ref 0.1–0.9)
MONOCYTES NFR BLD AUTO: 9.1 % (ref 5–12)
NEUTROPHILS NFR BLD AUTO: 3.89 10*3/MM3 (ref 1.7–7)
NEUTROPHILS NFR BLD AUTO: 52.9 % (ref 42.7–76)
NITRITE UR QL STRIP: NEGATIVE
NRBC BLD AUTO-RTO: 0 /100 WBC (ref 0–0.2)
PH UR STRIP.AUTO: 5.5 [PH] (ref 5–8)
PHOSPHATE SERPL-MCNC: 4.5 MG/DL (ref 2.5–4.5)
PLATELET # BLD AUTO: 208 10*3/MM3 (ref 140–450)
PMV BLD AUTO: 10 FL (ref 6–12)
POTASSIUM SERPL-SCNC: 4.6 MMOL/L (ref 3.5–5.2)
PROT ?TM UR-MCNC: 77.8 MG/DL
PROT UR QL STRIP: ABNORMAL
PROT/CREAT UR: 0.59 MG/G{CREAT}
PTH-INTACT SERPL-MCNC: 71.3 PG/ML (ref 15–65)
RBC # BLD AUTO: 3.56 10*6/MM3 (ref 3.77–5.28)
SODIUM SERPL-SCNC: 138 MMOL/L (ref 136–145)
SP GR UR STRIP: 1.02 (ref 1–1.03)
TIBC SERPL-MCNC: 325 MCG/DL (ref 298–536)
TRANSFERRIN SERPL-MCNC: 218 MG/DL (ref 200–360)
UROBILINOGEN UR QL STRIP: ABNORMAL
WBC NRBC COR # BLD: 7.35 10*3/MM3 (ref 3.4–10.8)

## 2023-10-27 PROCEDURE — 80069 RENAL FUNCTION PANEL: CPT

## 2023-10-27 PROCEDURE — 82570 ASSAY OF URINE CREATININE: CPT

## 2023-10-27 PROCEDURE — 84466 ASSAY OF TRANSFERRIN: CPT

## 2023-10-27 PROCEDURE — 83540 ASSAY OF IRON: CPT

## 2023-10-27 PROCEDURE — 82728 ASSAY OF FERRITIN: CPT

## 2023-10-27 PROCEDURE — 81001 URINALYSIS AUTO W/SCOPE: CPT

## 2023-10-27 PROCEDURE — 83970 ASSAY OF PARATHORMONE: CPT

## 2023-10-27 PROCEDURE — 85025 COMPLETE CBC W/AUTO DIFF WBC: CPT

## 2023-10-27 PROCEDURE — 84156 ASSAY OF PROTEIN URINE: CPT

## 2023-10-27 PROCEDURE — 36415 COLL VENOUS BLD VENIPUNCTURE: CPT

## 2023-10-30 ENCOUNTER — TELEPHONE (OUTPATIENT)
Dept: CARDIOLOGY | Facility: CLINIC | Age: 83
End: 2023-10-30
Payer: MEDICARE

## 2023-10-30 NOTE — TELEPHONE ENCOUNTER
----- Message from CAS Perry sent at 10/29/2023 12:20 AM EDT -----  Blood counts seem stable but renal function has worsened. Please forward results to Dr Cotto group for review.

## 2023-10-30 NOTE — TELEPHONE ENCOUNTER
JAMEL patient regarding results and recommendations. Voiced understanding.   Patient states her labs for Dr Cotto and she is seeing him Thursday

## 2023-12-11 ENCOUNTER — TRANSCRIBE ORDERS (OUTPATIENT)
Dept: LAB | Facility: HOSPITAL | Age: 83
End: 2023-12-11
Payer: MEDICARE

## 2023-12-11 ENCOUNTER — LAB (OUTPATIENT)
Dept: LAB | Facility: HOSPITAL | Age: 83
End: 2023-12-11
Payer: MEDICARE

## 2023-12-11 DIAGNOSIS — N18.5 CHRONIC KIDNEY DISEASE, STAGE V: ICD-10-CM

## 2023-12-11 DIAGNOSIS — N18.5 CHRONIC KIDNEY DISEASE, STAGE V: Primary | ICD-10-CM

## 2023-12-11 LAB
BASOPHILS # BLD AUTO: 0.08 10*3/MM3 (ref 0–0.2)
BASOPHILS NFR BLD AUTO: 0.9 % (ref 0–1.5)
DEPRECATED RDW RBC AUTO: 43.1 FL (ref 37–54)
EOSINOPHIL # BLD AUTO: 0.68 10*3/MM3 (ref 0–0.4)
EOSINOPHIL NFR BLD AUTO: 7.4 % (ref 0.3–6.2)
ERYTHROCYTE [DISTWIDTH] IN BLOOD BY AUTOMATED COUNT: 12.7 % (ref 12.3–15.4)
HCT VFR BLD AUTO: 34.4 % (ref 34–46.6)
HGB BLD-MCNC: 11.5 G/DL (ref 12–15.9)
IMM GRANULOCYTES # BLD AUTO: 0.04 10*3/MM3 (ref 0–0.05)
IMM GRANULOCYTES NFR BLD AUTO: 0.4 % (ref 0–0.5)
LYMPHOCYTES # BLD AUTO: 2.64 10*3/MM3 (ref 0.7–3.1)
LYMPHOCYTES NFR BLD AUTO: 28.9 % (ref 19.6–45.3)
MCH RBC QN AUTO: 31 PG (ref 26.6–33)
MCHC RBC AUTO-ENTMCNC: 33.4 G/DL (ref 31.5–35.7)
MCV RBC AUTO: 92.7 FL (ref 79–97)
MONOCYTES # BLD AUTO: 0.84 10*3/MM3 (ref 0.1–0.9)
MONOCYTES NFR BLD AUTO: 9.2 % (ref 5–12)
NEUTROPHILS NFR BLD AUTO: 4.86 10*3/MM3 (ref 1.7–7)
NEUTROPHILS NFR BLD AUTO: 53.2 % (ref 42.7–76)
NRBC BLD AUTO-RTO: 0 /100 WBC (ref 0–0.2)
PLATELET # BLD AUTO: 213 10*3/MM3 (ref 140–450)
PMV BLD AUTO: 10.3 FL (ref 6–12)
RBC # BLD AUTO: 3.71 10*6/MM3 (ref 3.77–5.28)
WBC NRBC COR # BLD AUTO: 9.14 10*3/MM3 (ref 3.4–10.8)

## 2023-12-11 PROCEDURE — 85025 COMPLETE CBC W/AUTO DIFF WBC: CPT

## 2023-12-11 PROCEDURE — 80069 RENAL FUNCTION PANEL: CPT

## 2023-12-11 PROCEDURE — 36415 COLL VENOUS BLD VENIPUNCTURE: CPT

## 2023-12-12 LAB
ALBUMIN SERPL-MCNC: 4 G/DL (ref 3.5–5.2)
ANION GAP SERPL CALCULATED.3IONS-SCNC: 12.1 MMOL/L (ref 5–15)
BUN SERPL-MCNC: 67 MG/DL (ref 8–23)
BUN/CREAT SERPL: 20.3 (ref 7–25)
CALCIUM SPEC-SCNC: 9.9 MG/DL (ref 8.6–10.5)
CHLORIDE SERPL-SCNC: 99 MMOL/L (ref 98–107)
CO2 SERPL-SCNC: 22.9 MMOL/L (ref 22–29)
CREAT SERPL-MCNC: 3.3 MG/DL (ref 0.57–1)
EGFRCR SERPLBLD CKD-EPI 2021: 13.5 ML/MIN/1.73
GLUCOSE SERPL-MCNC: 217 MG/DL (ref 65–99)
PHOSPHATE SERPL-MCNC: 4.7 MG/DL (ref 2.5–4.5)
POTASSIUM SERPL-SCNC: 4.6 MMOL/L (ref 3.5–5.2)
SODIUM SERPL-SCNC: 134 MMOL/L (ref 136–145)

## 2024-01-29 RX ORDER — FUROSEMIDE 40 MG/1
40 TABLET ORAL DAILY
Qty: 90 TABLET | Refills: 1 | Status: SHIPPED | OUTPATIENT
Start: 2024-01-29

## 2024-02-20 ENCOUNTER — TRANSCRIBE ORDERS (OUTPATIENT)
Dept: ADMINISTRATIVE | Facility: HOSPITAL | Age: 84
End: 2024-02-20
Payer: MEDICARE

## 2024-02-20 ENCOUNTER — LAB (OUTPATIENT)
Dept: LAB | Facility: HOSPITAL | Age: 84
End: 2024-02-20
Payer: MEDICARE

## 2024-02-20 DIAGNOSIS — N18.30 STAGE 3 CHRONIC KIDNEY DISEASE, UNSPECIFIED WHETHER STAGE 3A OR 3B CKD: Primary | ICD-10-CM

## 2024-02-20 DIAGNOSIS — I10 ESSENTIAL HYPERTENSION, MALIGNANT: ICD-10-CM

## 2024-02-20 DIAGNOSIS — I50.1 LVF (LEFT VENTRICULAR FAILURE): ICD-10-CM

## 2024-02-20 DIAGNOSIS — E55.9 VITAMIN D DEFICIENCY: ICD-10-CM

## 2024-02-20 DIAGNOSIS — M10.00 IDIOPATHIC GOUT, UNSPECIFIED CHRONICITY, UNSPECIFIED SITE: ICD-10-CM

## 2024-02-20 DIAGNOSIS — E08.22 DIABETES MELLITUS DUE TO UNDERLYING CONDITION WITH DIABETIC CHRONIC KIDNEY DISEASE, UNSPECIFIED CKD STAGE, UNSPECIFIED WHETHER LONG TERM INSULIN USE: ICD-10-CM

## 2024-02-20 DIAGNOSIS — N18.30 STAGE 3 CHRONIC KIDNEY DISEASE, UNSPECIFIED WHETHER STAGE 3A OR 3B CKD: ICD-10-CM

## 2024-02-20 LAB
25(OH)D3 SERPL-MCNC: 93.1 NG/ML (ref 30–100)
ALBUMIN SERPL-MCNC: 4.1 G/DL (ref 3.5–5.2)
ANION GAP SERPL CALCULATED.3IONS-SCNC: 12.4 MMOL/L (ref 5–15)
BACTERIA UR QL AUTO: ABNORMAL /HPF
BASOPHILS # BLD AUTO: 0.06 10*3/MM3 (ref 0–0.2)
BASOPHILS NFR BLD AUTO: 0.6 % (ref 0–1.5)
BILIRUB UR QL STRIP: NEGATIVE
BUN SERPL-MCNC: 52 MG/DL (ref 8–23)
BUN/CREAT SERPL: 16.4 (ref 7–25)
CALCIUM SPEC-SCNC: 9.7 MG/DL (ref 8.6–10.5)
CHLORIDE SERPL-SCNC: 100 MMOL/L (ref 98–107)
CLARITY UR: CLEAR
CO2 SERPL-SCNC: 26.6 MMOL/L (ref 22–29)
COLOR UR: YELLOW
CREAT SERPL-MCNC: 3.18 MG/DL (ref 0.57–1)
CREAT UR-MCNC: 70.3 MG/DL
DEPRECATED RDW RBC AUTO: 39.8 FL (ref 37–54)
EGFRCR SERPLBLD CKD-EPI 2021: 14.2 ML/MIN/1.73
EOSINOPHIL # BLD AUTO: 0.38 10*3/MM3 (ref 0–0.4)
EOSINOPHIL NFR BLD AUTO: 3.5 % (ref 0.3–6.2)
ERYTHROCYTE [DISTWIDTH] IN BLOOD BY AUTOMATED COUNT: 11.9 % (ref 12.3–15.4)
GLUCOSE SERPL-MCNC: 225 MG/DL (ref 65–99)
GLUCOSE UR STRIP-MCNC: ABNORMAL MG/DL
HCT VFR BLD AUTO: 34 % (ref 34–46.6)
HGB BLD-MCNC: 11.1 G/DL (ref 12–15.9)
HGB UR QL STRIP.AUTO: NEGATIVE
HYALINE CASTS UR QL AUTO: ABNORMAL /LPF
IMM GRANULOCYTES # BLD AUTO: 0.04 10*3/MM3 (ref 0–0.05)
IMM GRANULOCYTES NFR BLD AUTO: 0.4 % (ref 0–0.5)
KETONES UR QL STRIP: NEGATIVE
LEUKOCYTE ESTERASE UR QL STRIP.AUTO: ABNORMAL
LYMPHOCYTES # BLD AUTO: 2.38 10*3/MM3 (ref 0.7–3.1)
LYMPHOCYTES NFR BLD AUTO: 22.2 % (ref 19.6–45.3)
MCH RBC QN AUTO: 29.8 PG (ref 26.6–33)
MCHC RBC AUTO-ENTMCNC: 32.6 G/DL (ref 31.5–35.7)
MCV RBC AUTO: 91.4 FL (ref 79–97)
MONOCYTES # BLD AUTO: 0.74 10*3/MM3 (ref 0.1–0.9)
MONOCYTES NFR BLD AUTO: 6.9 % (ref 5–12)
NEUTROPHILS NFR BLD AUTO: 66.4 % (ref 42.7–76)
NEUTROPHILS NFR BLD AUTO: 7.14 10*3/MM3 (ref 1.7–7)
NITRITE UR QL STRIP: NEGATIVE
NRBC BLD AUTO-RTO: 0 /100 WBC (ref 0–0.2)
PH UR STRIP.AUTO: 6 [PH] (ref 5–8)
PHOSPHATE SERPL-MCNC: 4.2 MG/DL (ref 2.5–4.5)
PLATELET # BLD AUTO: 231 10*3/MM3 (ref 140–450)
PMV BLD AUTO: 10.2 FL (ref 6–12)
POTASSIUM SERPL-SCNC: 4.2 MMOL/L (ref 3.5–5.2)
PROT ?TM UR-MCNC: 33.1 MG/DL
PROT UR QL STRIP: ABNORMAL
PROT/CREAT UR: 0.47 MG/G{CREAT}
PTH-INTACT SERPL-MCNC: 62.1 PG/ML (ref 15–65)
RBC # BLD AUTO: 3.72 10*6/MM3 (ref 3.77–5.28)
RBC # UR STRIP: ABNORMAL /HPF
REF LAB TEST METHOD: ABNORMAL
SODIUM SERPL-SCNC: 139 MMOL/L (ref 136–145)
SP GR UR STRIP: 1.02 (ref 1–1.03)
SQUAMOUS #/AREA URNS HPF: ABNORMAL /HPF
URATE SERPL-MCNC: 4.8 MG/DL (ref 2.4–5.7)
UROBILINOGEN UR QL STRIP: ABNORMAL
WBC # UR STRIP: ABNORMAL /HPF
WBC NRBC COR # BLD AUTO: 10.74 10*3/MM3 (ref 3.4–10.8)

## 2024-02-20 PROCEDURE — 84550 ASSAY OF BLOOD/URIC ACID: CPT

## 2024-02-20 PROCEDURE — 36415 COLL VENOUS BLD VENIPUNCTURE: CPT

## 2024-02-20 PROCEDURE — 83970 ASSAY OF PARATHORMONE: CPT

## 2024-02-20 PROCEDURE — 80069 RENAL FUNCTION PANEL: CPT

## 2024-02-20 PROCEDURE — 84156 ASSAY OF PROTEIN URINE: CPT

## 2024-02-20 PROCEDURE — 81001 URINALYSIS AUTO W/SCOPE: CPT

## 2024-02-20 PROCEDURE — 85025 COMPLETE CBC W/AUTO DIFF WBC: CPT

## 2024-02-20 PROCEDURE — 82570 ASSAY OF URINE CREATININE: CPT

## 2024-02-20 PROCEDURE — 82306 VITAMIN D 25 HYDROXY: CPT

## 2024-02-27 ENCOUNTER — OFFICE VISIT (OUTPATIENT)
Dept: CARDIOLOGY | Facility: CLINIC | Age: 84
End: 2024-02-27
Payer: MEDICARE

## 2024-02-27 VITALS
HEIGHT: 67 IN | BODY MASS INDEX: 29.98 KG/M2 | WEIGHT: 191 LBS | DIASTOLIC BLOOD PRESSURE: 59 MMHG | HEART RATE: 85 BPM | SYSTOLIC BLOOD PRESSURE: 132 MMHG

## 2024-02-27 DIAGNOSIS — I50.32 CHRONIC DIASTOLIC CONGESTIVE HEART FAILURE: ICD-10-CM

## 2024-02-27 DIAGNOSIS — I10 HYPERTENSION, ESSENTIAL: Chronic | ICD-10-CM

## 2024-02-27 DIAGNOSIS — I25.10 CORONARY ARTERY DISEASE INVOLVING NATIVE CORONARY ARTERY OF NATIVE HEART WITHOUT ANGINA PECTORIS: Primary | ICD-10-CM

## 2024-02-27 DIAGNOSIS — E78.2 MIXED HYPERLIPIDEMIA: ICD-10-CM

## 2024-02-27 PROBLEM — N25.81 SECONDARY HYPERPARATHYROIDISM: Status: ACTIVE | Noted: 2021-11-01

## 2024-02-27 PROBLEM — E11.22 TYPE 2 DIABETES MELLITUS WITH DIABETIC CHRONIC KIDNEY DISEASE: Chronic | Status: ACTIVE | Noted: 2022-05-16

## 2024-02-27 PROBLEM — J45.909 ASTHMA: Status: ACTIVE | Noted: 2024-02-27

## 2024-02-27 PROBLEM — E55.9 VITAMIN D DEFICIENCY: Status: ACTIVE | Noted: 2021-11-01

## 2024-02-27 PROBLEM — M19.90 ARTHRITIS: Status: ACTIVE | Noted: 2024-02-27

## 2024-02-27 PROBLEM — D64.9 ANEMIA: Status: RESOLVED | Noted: 2021-11-01 | Resolved: 2024-02-27

## 2024-02-27 PROBLEM — I50.9 CONGESTIVE HEART FAILURE: Status: ACTIVE | Noted: 2021-11-01

## 2024-02-27 PROBLEM — D64.9 ANEMIA: Status: ACTIVE | Noted: 2021-11-01

## 2024-02-27 PROBLEM — M10.9 GOUT: Status: ACTIVE | Noted: 2021-11-01

## 2024-02-27 PROBLEM — E78.00 HIGH CHOLESTEROL: Status: RESOLVED | Noted: 2021-09-21 | Resolved: 2024-02-27

## 2024-02-27 PROBLEM — J44.9 CHRONIC OBSTRUCTIVE PULMONARY DISEASE: Status: RESOLVED | Noted: 2024-02-27 | Resolved: 2024-02-27

## 2024-02-27 RX ORDER — SEVELAMER CARBONATE 800 MG/1
800 TABLET, FILM COATED ORAL
COMMUNITY
Start: 2023-12-15 | End: 2024-12-14

## 2024-02-27 RX ORDER — CARVEDILOL 25 MG/1
25 TABLET ORAL 2 TIMES DAILY
Qty: 180 TABLET | Refills: 3 | Status: SHIPPED | OUTPATIENT
Start: 2024-02-27

## 2024-02-27 NOTE — PROGRESS NOTES
Chief Complaint  Follow-up    Subjective            History of Present Illness  Mercedes Felipe is an 83-year-old female patient who presents to the office today for follow-up.  She has CAD, CHF, hypertension, and hyperlipidemia.  She is compliant with medication.  She admits to some chronic stable shortness of breath which she attributes to COPD.  She denies any anginal symptoms, edema, lightheadedness/dizziness, or palpitations.    PMH  Past Medical History:   Diagnosis Date    Ankle pain     BILATERAL    Arthritis     Asthma     CAD 2007    Coronary artery disease with 100% occluded right coronary artery with collaterals demonstrated on cardiac catheterization in 2007;    Chronic obstructive pulmonary disease 02/27/2024    CKD (chronic kidney disease) stage 4, GFR 15-29 ml/min 09/21/2021    COPD (chronic obstructive pulmonary disease)     Coronary artery disease 01/01/2007    Coronary artery disease with 100% occluded right coronary artery with collaterals demonstrated on cardiac catheterization in 2007;    Essential hypertension     Fracture     RIGHT FOOT ,    Gout     High cholesterol     History of emphysema     Hyperthyroidism     Myocardial infarction     Numbness in feet     Rectal bleeding 02/20/2014    Sleep apnea     Type 1 diabetes mellitus          ALLERGY  Allergies   Allergen Reactions    Penicillins Anaphylaxis    Sulfa Antibiotics Hives          SURGICALHX  Past Surgical History:   Procedure Laterality Date    ARTERIOVENOUS FISTULA/SHUNT SURGERY Left 4/25/2023    Procedure: Creation of left arm arteriovenous fistula;  Surgeon: Rajesh Murray MD;  Location: [facility];  Service: Vascular;  Laterality: Left;    BACK SURGERY      BREAST SURGERY      DILATATION AND CURETTAGE      TOTAL THYROIDECTOMY Left     TUBAL ABDOMINAL LIGATION            SOC  Social History     Socioeconomic History    Marital status:    Tobacco Use    Smoking status: Former     Packs/day: 1.50     Years: 30.00      Additional pack years: 0.00     Total pack years: 45.00     Types: Cigarettes     Quit date:      Years since quittin.1    Smokeless tobacco: Never   Vaping Use    Vaping Use: Never used   Substance and Sexual Activity    Alcohol use: Yes     Comment: occasional    Drug use: Never         FAMHX  Family History   Problem Relation Age of Onset    Heart disease Mother     Heart disease Father     Diabetes Sister     Diabetes Brother     Breast cancer Daughter     Stroke Other     Other Other         Spine Problems     Heart attack Other     Malig Hyperthermia Neg Hx           MEDSIGONLY  Current Outpatient Medications on File Prior to Visit   Medication Sig    acetaminophen (TYLENOL) 500 MG tablet Take 1 tablet by mouth Every 6 (Six) Hours As Needed for Mild Pain.    albuterol sulfate  (90 Base) MCG/ACT inhaler Inhale 2 puffs Every 4 (Four) Hours As Needed for Wheezing.    aspirin 81 MG EC tablet Take 1 tablet by mouth Daily.    calcitriol (ROCALTROL) 0.25 MCG capsule Take 1 capsule by mouth Daily.    carvedilol (COREG) 25 MG tablet Take 1 tablet by mouth 2 (Two) Times a Day.    Casanthranol-Docusate Sodium (LAXATIVE PLUS STOOL SOFTENER PO) Take  by mouth.    cetirizine (zyrTEC) 10 MG tablet Take 1 tablet by mouth Daily.    febuxostat (ULORIC) 40 MG tablet Take 1 tablet by mouth Daily.    Fluticasone-Umeclidin-Vilant (TRELEGY) 100-62.5-25 MCG/INH inhaler Inhale 1 puff Daily.    folic acid (FOLVITE) 1 MG tablet Take 1 tablet by mouth Daily.    furosemide (LASIX) 40 MG tablet TAKE ONE TABLET BY MOUTH DAILY    glimepiride (AMARYL) 2 MG tablet Take 1 tablet by mouth Every Morning Before Breakfast.    Krill Oil 500 MG capsule Take  by mouth.    levothyroxine (SYNTHROID, LEVOTHROID) 50 MCG tablet Take 1 tablet by mouth Daily.    liothyronine (CYTOMEL) 5 MCG tablet 1 tablet. Take 2 tablet in the AM and 1 tablet q pm    magnesium oxide (MAGOX) 400 (241.3 Mg) MG tablet tablet Take 1 tablet by mouth Daily.     "mirtazapine (REMERON SOL-TAB) 15 MG disintegrating tablet Place 1 tablet on the tongue Every Night.    nitroglycerin (NITROSTAT) 0.4 MG SL tablet PLACE ONE TABLET UNDER TONGUE AS NEEDED FOR CHEST PAIN WAIT 5 MINUTES AND REPEAT UP TO 3 TIMES IF NEEDED    rosuvastatin (CRESTOR) 40 MG tablet Take 1 tablet by mouth Daily.    sevelamer (RENVELA) 800 MG tablet Take 1 tablet by mouth.    Soliqua 100-33 UNT-MCG/ML solution pen-injector injection INJECT 30 UNITS UNDER THE SKIN EVERY DAY    vitamin D3 125 MCG (5000 UT) capsule capsule Take 1 capsule by mouth Daily.     No current facility-administered medications on file prior to visit.         Objective   /59   Pulse 85   Ht 170.2 cm (67\")   Wt 86.6 kg (191 lb)   BMI 29.91 kg/m²       Physical Exam  HENT:      Head: Normocephalic.   Neck:      Vascular: No carotid bruit.   Cardiovascular:      Rate and Rhythm: Normal rate and regular rhythm.      Pulses: Normal pulses.      Heart sounds: Normal heart sounds. No murmur heard.  Pulmonary:      Effort: Pulmonary effort is normal.      Breath sounds: Normal breath sounds.   Musculoskeletal:      Cervical back: Neck supple.      Right lower leg: No edema.      Left lower leg: No edema.   Skin:     General: Skin is dry.   Neurological:      Mental Status: She is alert and oriented to person, place, and time.   Psychiatric:         Behavior: Behavior normal.       Result Review :   The following data was reviewed by: CAS Neri on 02/27/2024:  No results found for: \"PROBNP\"  CMP          2/20/2024    15:09   CMP   Glucose 225    BUN 52    Creatinine 3.18    EGFR 14.2    Sodium 139    Potassium 4.2    Chloride 100    Calcium 9.7    Albumin 4.1    BUN/Creatinine Ratio 16.4    Anion Gap 12.4      CBC w/diff          2/20/2024    15:09   CBC w/Diff   WBC 10.74    RBC 3.72    Hemoglobin 11.1    Hematocrit 34.0    MCV 91.4    MCH 29.8    MCHC 32.6    RDW 11.9    Platelets 231    Neutrophil Rel % 66.4    Immature " "Granulocyte Rel % 0.4    Lymphocyte Rel % 22.2    Monocyte Rel % 6.9    Eosinophil Rel % 3.5    Basophil Rel % 0.6       Lab Results   Component Value Date    TSH 2.070 09/15/2021      Lab Results   Component Value Date    FREET4 0.89 (L) 09/15/2021      No results found for: \"DDIMERQUANT\"  Magnesium   Date Value Ref Range Status   03/08/2022 2.0 1.6 - 2.4 mg/dL Final      No results found for: \"DIGOXIN\"   No results found for: \"TROPONINT\"             Assessment and Plan    Diagnoses and all orders for this visit:    1. CAD (Primary)  She denies any anginal symptoms, continue aspirin 81 mg daily.    2. Chronic diastolic congestive heart failure  Symptomatically stable at this time and euvolemic on exam today, continue Lasix 40 mg daily.    3. Hypertension, essential  Currently controlled and without adverse effects from medication, continue carvedilol 25 mg twice daily.    4. Mixed hyperlipidemia  Last lipid panel was 1/27/2023 with LDL 59 which is within goal range, continue rosuvastatin 40 mg daily.  Repeat fasting lipid and hepatic function panel to make sure this dose is still appropriate for her.  -     Lipid Panel; Future  -     Hepatic Function Panel; Future    Other orders  -     carvedilol (COREG) 25 MG tablet; Take 1 tablet by mouth 2 (Two) Times a Day.  Dispense: 180 tablet; Refill: 3            Follow Up   Return in about 6 months (around 8/27/2024) for Follow up with Dr Hale.    Patient was given instructions and counseling regarding her condition or for health maintenance advice. Please see specific information pulled into the AVS if appropriate.     Mercedes Felipe  reports that she quit smoking about 24 years ago. Her smoking use included cigarettes. She has a 45.00 pack-year smoking history. She has never used smokeless tobacco.         Lola Jiang, APRN  02/27/24  13:05 EST    Dictated Utilizing Dragon Dictation    "

## 2024-04-22 ENCOUNTER — OFFICE VISIT (OUTPATIENT)
Dept: VASCULAR SURGERY | Facility: HOSPITAL | Age: 84
End: 2024-04-22
Payer: MEDICARE

## 2024-04-22 VITALS
OXYGEN SATURATION: 95 % | HEART RATE: 83 BPM | SYSTOLIC BLOOD PRESSURE: 122 MMHG | TEMPERATURE: 97.7 F | DIASTOLIC BLOOD PRESSURE: 56 MMHG | RESPIRATION RATE: 18 BRPM

## 2024-04-22 DIAGNOSIS — N18.5 STAGE 5 CHRONIC KIDNEY DISEASE NOT ON CHRONIC DIALYSIS: Primary | ICD-10-CM

## 2024-04-22 PROCEDURE — G0463 HOSPITAL OUTPT CLINIC VISIT: HCPCS | Performed by: SURGERY

## 2024-04-22 PROCEDURE — 1160F RVW MEDS BY RX/DR IN RCRD: CPT | Performed by: SURGERY

## 2024-04-22 PROCEDURE — 3074F SYST BP LT 130 MM HG: CPT | Performed by: SURGERY

## 2024-04-22 PROCEDURE — 1159F MED LIST DOCD IN RCRD: CPT | Performed by: SURGERY

## 2024-04-22 PROCEDURE — 3078F DIAST BP <80 MM HG: CPT | Performed by: SURGERY

## 2024-04-22 PROCEDURE — 99213 OFFICE O/P EST LOW 20 MIN: CPT | Performed by: SURGERY

## 2024-04-22 NOTE — PROGRESS NOTES
[facility]   Follow up Office    Patient Name: Mercedes Felipe  : 1940  MRN: [id number]  Primary Care Physician:  Rito Barahona MD      Subjective   Subjective     HPI:    Mercedes Felipe is a 83 y.o. female here for follow-up to check on her left arm fistula created on 2023.  She is not yet on dialysis.  She indicates that she continues to have events of some numbness and tingling which sometimes actually is a bit painful.  This typically happens at night.  It has also more recently occurred that she develops cramps in her left hand.  It is not every day.  She feels she can still live with this and would rather not have any interventions.      Objective     Vitals:   Temp:  [97.7 °F (36.5 °C)] 97.7 °F (36.5 °C)  Heart Rate:  [83] 83  Resp:  [18] 18  BP: (122)/(56) 122/56    Physical Exam      General: Alert, no acute distress.  HEENT: PERRLA  Abdomen: Benign  Extremities: Symmetric.  Left arm fistula: Excellent bruit and thrill with satisfactory caliber of at least 6 to 7 mm.  Neuro: No gross deficits    Assessment & Plan   Assessment / Plan     Diagnoses and all orders for this visit:    1. Stage 5 chronic kidney disease not on chronic dialysis (Primary)       Assessment/Plan:   Satisfactory progress following left arm fistula creation.  Mild to moderate steal, tolerable.  Continue to exercise the left hand.  Follow-up in 6 months.  Okay to use the left arm fistula if needed.        Electronically signed by Rajesh Murray MD, 24, 11:28 AM EDT.

## 2024-05-20 ENCOUNTER — LAB (OUTPATIENT)
Dept: LAB | Facility: HOSPITAL | Age: 84
End: 2024-05-20
Payer: MEDICARE

## 2024-05-20 ENCOUNTER — TRANSCRIBE ORDERS (OUTPATIENT)
Dept: ADMINISTRATIVE | Facility: HOSPITAL | Age: 84
End: 2024-05-20
Payer: MEDICARE

## 2024-05-20 DIAGNOSIS — I25.10 DISEASE OF CARDIOVASCULAR SYSTEM: ICD-10-CM

## 2024-05-20 DIAGNOSIS — E21.1 SECONDARY HYPERPARATHYROIDISM, NON-RENAL: ICD-10-CM

## 2024-05-20 DIAGNOSIS — G63 GOUTY NEURITIS: ICD-10-CM

## 2024-05-20 DIAGNOSIS — N18.6 ANEMIA IN END-STAGE RENAL DISEASE: ICD-10-CM

## 2024-05-20 DIAGNOSIS — I50.1 LEFT HEART FAILURE: ICD-10-CM

## 2024-05-20 DIAGNOSIS — I50.9 HEART FAILURE, UNSPECIFIED HF CHRONICITY, UNSPECIFIED HEART FAILURE TYPE: ICD-10-CM

## 2024-05-20 DIAGNOSIS — N18.6 TYPE 2 DIABETES MELLITUS WITH ESRD (END-STAGE RENAL DISEASE): ICD-10-CM

## 2024-05-20 DIAGNOSIS — E11.22 TYPE 2 DIABETES MELLITUS WITH ESRD (END-STAGE RENAL DISEASE): ICD-10-CM

## 2024-05-20 DIAGNOSIS — E87.20 ACIDEMIA: ICD-10-CM

## 2024-05-20 DIAGNOSIS — N18.5 CHRONIC KIDNEY DISEASE, STAGE V: ICD-10-CM

## 2024-05-20 DIAGNOSIS — D50.8 IRON DEFICIENCY ANEMIA SECONDARY TO INADEQUATE DIETARY IRON INTAKE: ICD-10-CM

## 2024-05-20 DIAGNOSIS — I10 ESSENTIAL HYPERTENSION, MALIGNANT: ICD-10-CM

## 2024-05-20 DIAGNOSIS — E11.9 DIABETES MELLITUS WITHOUT COMPLICATION: ICD-10-CM

## 2024-05-20 DIAGNOSIS — D63.1 ANEMIA IN END-STAGE RENAL DISEASE: ICD-10-CM

## 2024-05-20 DIAGNOSIS — E55.9 VITAMIN D DEFICIENCY: ICD-10-CM

## 2024-05-20 DIAGNOSIS — M10.00 GOUTY NEURITIS: ICD-10-CM

## 2024-05-20 DIAGNOSIS — M1A.30X0 CHRONIC GOUT DUE TO RENAL IMPAIRMENT WITHOUT TOPHUS, UNSPECIFIED SITE: ICD-10-CM

## 2024-05-20 DIAGNOSIS — N18.5 CHRONIC KIDNEY DISEASE, STAGE V: Primary | ICD-10-CM

## 2024-05-20 LAB
ALBUMIN SERPL-MCNC: 4.3 G/DL (ref 3.5–5.2)
ANION GAP SERPL CALCULATED.3IONS-SCNC: 8 MMOL/L (ref 5–15)
BASOPHILS # BLD AUTO: 0.11 10*3/MM3 (ref 0–0.2)
BASOPHILS NFR BLD AUTO: 0.9 % (ref 0–1.5)
BUN SERPL-MCNC: 54 MG/DL (ref 8–23)
BUN/CREAT SERPL: 20.1 (ref 7–25)
CALCIUM SPEC-SCNC: 9.7 MG/DL (ref 8.6–10.5)
CHLORIDE SERPL-SCNC: 103 MMOL/L (ref 98–107)
CO2 SERPL-SCNC: 26 MMOL/L (ref 22–29)
CREAT SERPL-MCNC: 2.68 MG/DL (ref 0.57–1)
DEPRECATED RDW RBC AUTO: 43.5 FL (ref 37–54)
EGFRCR SERPLBLD CKD-EPI 2021: 17.4 ML/MIN/1.73
EOSINOPHIL # BLD AUTO: 0.38 10*3/MM3 (ref 0–0.4)
EOSINOPHIL NFR BLD AUTO: 3.2 % (ref 0.3–6.2)
ERYTHROCYTE [DISTWIDTH] IN BLOOD BY AUTOMATED COUNT: 13 % (ref 12.3–15.4)
GLUCOSE SERPL-MCNC: 161 MG/DL (ref 65–99)
HCT VFR BLD AUTO: 33.8 % (ref 34–46.6)
HGB BLD-MCNC: 10.9 G/DL (ref 12–15.9)
IMM GRANULOCYTES # BLD AUTO: 0.08 10*3/MM3 (ref 0–0.05)
IMM GRANULOCYTES NFR BLD AUTO: 0.7 % (ref 0–0.5)
LYMPHOCYTES # BLD AUTO: 2.84 10*3/MM3 (ref 0.7–3.1)
LYMPHOCYTES NFR BLD AUTO: 23.7 % (ref 19.6–45.3)
MCH RBC QN AUTO: 29.9 PG (ref 26.6–33)
MCHC RBC AUTO-ENTMCNC: 32.2 G/DL (ref 31.5–35.7)
MCV RBC AUTO: 92.6 FL (ref 79–97)
MONOCYTES # BLD AUTO: 1.13 10*3/MM3 (ref 0.1–0.9)
MONOCYTES NFR BLD AUTO: 9.4 % (ref 5–12)
NEUTROPHILS NFR BLD AUTO: 62.1 % (ref 42.7–76)
NEUTROPHILS NFR BLD AUTO: 7.45 10*3/MM3 (ref 1.7–7)
NRBC BLD AUTO-RTO: 0 /100 WBC (ref 0–0.2)
PHOSPHATE SERPL-MCNC: 2.9 MG/DL (ref 2.5–4.5)
PLATELET # BLD AUTO: 305 10*3/MM3 (ref 140–450)
PMV BLD AUTO: 9.5 FL (ref 6–12)
POTASSIUM SERPL-SCNC: 4.9 MMOL/L (ref 3.5–5.2)
RBC # BLD AUTO: 3.65 10*6/MM3 (ref 3.77–5.28)
SODIUM SERPL-SCNC: 137 MMOL/L (ref 136–145)
WBC NRBC COR # BLD AUTO: 11.99 10*3/MM3 (ref 3.4–10.8)

## 2024-05-20 PROCEDURE — 36415 COLL VENOUS BLD VENIPUNCTURE: CPT

## 2024-05-20 PROCEDURE — 80069 RENAL FUNCTION PANEL: CPT

## 2024-05-20 PROCEDURE — 85025 COMPLETE CBC W/AUTO DIFF WBC: CPT

## 2024-05-21 ENCOUNTER — TELEPHONE (OUTPATIENT)
Dept: CARDIOLOGY | Facility: CLINIC | Age: 84
End: 2024-05-21
Payer: MEDICARE

## 2024-05-21 NOTE — TELEPHONE ENCOUNTER
PER FIORELLA HOBBS, APRN-  Renal function is improved and electrolytes are in normal range, continue current medication       Sent via my chart.

## 2024-05-23 ENCOUNTER — TELEPHONE (OUTPATIENT)
Dept: CARDIOLOGY | Facility: CLINIC | Age: 84
End: 2024-05-23
Payer: MEDICARE

## 2024-05-23 RX ORDER — ROSUVASTATIN CALCIUM 40 MG/1
40 TABLET, COATED ORAL DAILY
Qty: 90 TABLET | Refills: 3 | Status: SHIPPED | OUTPATIENT
Start: 2024-05-23

## 2024-05-23 NOTE — TELEPHONE ENCOUNTER
The [facility] received a fax that requires your attention. The document has been indexed to the patient’s chart for your review.      Reason for sending: EXTERNAL MEDICAL RECORD NOTIFICATION     Documents Description: MEDS-ROSUVASTATIN REFILL-5.23.24    Name of Sender: [employer]ARON     Date Indexed: 5.23.24

## 2024-07-13 NOTE — ANESTHESIA PREPROCEDURE EVALUATION
[facility] Medicine Services  PROGRESS NOTE    Patient Name: Mariia Roberson  : 1936  MRN: [id number]    Date of Admission: 2024  Primary Care Physician: Susan Waters APRN    Subjective   Subjective     CC:  Dark stools    HPI:  Patient fatigued from EGD.  Daughter at the bedside.  Daughter reports some confusion, however, ambulates well at home with walker.       Objective   Objective     Vital Signs:   Temp:  [97 °F (36.1 °C)-98.8 °F (37.1 °C)] 97.8 °F (36.6 °C)  Heart Rate:  [] 66  Resp:  [16-20] 20  BP: (100-194)/() 134/84  Flow (L/min):  [2] 2     Physical Exam:  Constitutional:  female no acute distress, awake, alert  HENT: NCAT, mucous membranes moist  Respiratory: Clear to auscultation bilaterally, respiratory effort normal   Cardiovascular: RRR, no murmurs, rubs, or gallops  Gastrointestinal: Positive bowel sounds, soft, nontender, nondistended  Musculoskeletal: No bilateral ankle edema  Psychiatric: Appropriate affect, cooperative  Neurologic: Oriented , alert speech clear  Skin: No rashes    Results Reviewed:  LAB RESULTS:      Lab 24  04524  2318 24  1704   WBC 13.72*  --  16.20*   HEMOGLOBIN 10.2*  10.2* 9.6* 10.7*   HEMATOCRIT 31.7*  31.7* 30.1* 33.8*   PLATELETS 388  --  457*   NEUTROS ABS 9.83*  --  11.45*   IMMATURE GRANS (ABS) 0.07*  --  0.07*   LYMPHS ABS 2.90  --  3.60*   MONOS ABS 0.74  --  0.84   EOS ABS 0.12  --  0.17   MCV 89.5  --  92.6   SED RATE  --   --  73*   CRP  --   --  7.63*   PROCALCITONIN  --   --  0.07   LACTATE  --   --  1.5   PROTIME 14.1  --   --    APTT 32.6  --   --    D DIMER QUANT  --   --  1.11*         Lab 24  0459 24  1715 24  1704   SODIUM 134*  --  138   POTASSIUM 3.6  --  3.6   CHLORIDE 94*  --  97*   CO2 28.0  --  31.0*   ANION GAP 12.0  --  10.0   BUN 18  --  20   CREATININE 1.01* 1.40* 1.32*   EGFR 54.3*  --  39.4*   GLUCOSE 114*  --  131*   CALCIUM 8.5*  --   " Anesthesia Evaluation     Patient summary reviewed and Nursing notes reviewed   history of anesthetic complications: PONV  NPO Solid Status: > 8 hours  NPO Liquid Status: > 2 hours           Airway   Mallampati: II  TM distance: >3 FB  Neck ROM: full  No difficulty expected  Dental    (+) edentulous    Pulmonary - normal exam    breath sounds clear to auscultation  (+) a smoker Former, COPD moderate, asthma,home oxygen, shortness of breath,   Cardiovascular - normal exam  Exercise tolerance: poor (<4 METS)    ECG reviewed  Rhythm: regular  Rate: normal    (+) hypertension, past MI , CAD, HEATH, hyperlipidemia,       Neuro/Psych  (+) numbness,    GI/Hepatic/Renal/Endo    (+)  GERD well controlled, GI bleeding resolved, renal disease, diabetes mellitus type 2, thyroid problem hypothyroidism    Musculoskeletal     Abdominal    Substance History - negative use     OB/GYN negative ob/gyn ROS         Other   arthritis,      ROS/Med Hx Other: <4METS, SOAE, DECREASED MOBILITY. HX CAD,HTN, COPD, АННА, CKD STAGE IV, DM. CARDS OV 2/3/23 \"STABLE\" F/U 6MTHS.  KT                 Anesthesia Plan    ASA 4     MAC     (Patient understands anesthesia not responsible for dental damage.)  intravenous induction     Anesthetic plan, risks, benefits, and alternatives have been provided, discussed and informed consent has been obtained with: patient.  Pre-procedure education provided  Use of blood products discussed with patient  Consented to blood products.   Plan discussed with CRNA.        CODE STATUS:       " "9.1   MAGNESIUM 1.6  --   --          Lab 07/12/24  1704   TOTAL PROTEIN 7.3   ALBUMIN 3.5   GLOBULIN 3.8   ALT (SGPT) 10   AST (SGOT) 16   BILIRUBIN 0.3   ALK PHOS 150*         Lab 07/13/24  0459 07/12/24  1704   PROBNP  --  970.8   PROTIME 14.1  --    INR 1.08  --              Lab 07/12/24  1853 07/12/24  1736   ABO TYPING A A   RH TYPING Positive Positive   ANTIBODY SCREEN  --  Negative         Brief Urine Lab Results  (Last result in the past 365 days)        Color   Clarity   Blood   Leuk Est   Nitrite   Protein   CREAT   Urine HCG        07/12/24 1640             94.1         07/12/24 1640 Yellow   Cloudy   Moderate (2+)   Moderate (2+)   Negative   100 mg/dL (2+)                   Cultures:  No results found for: \"BLOODCX\", \"URINECX\", \"WOUNDCX\", \"MRSACX\", \"RESPCX\", \"STOOLCX\"    Microbiology Results Abnormal       Procedure Component Value - Date/Time    Respiratory Panel PCR w/COVID-19(SARS-CoV-2) SANCHEZ/REYNALDO/ERLIN/PAD/COR/FELICIA In-House, NP Swab in UTM/VTM, 2 HR TAT - Swab, Nasopharynx [[id number]]  (Normal) Collected: 07/12/24 1738    Lab Status: Final result Specimen: Swab from Nasopharynx Updated: 07/1936     ADENOVIRUS, PCR Not Detected     Coronavirus 229E Not Detected     Coronavirus HKU1 Not Detected     Coronavirus NL63 Not Detected     Coronavirus OC43 Not Detected     COVID19 Not Detected     Human Metapneumovirus Not Detected     Human Rhinovirus/Enterovirus Not Detected     Influenza A PCR Not Detected     Influenza B PCR Not Detected     Parainfluenza Virus 1 Not Detected     Parainfluenza Virus 2 Not Detected     Parainfluenza Virus 3 Not Detected     Parainfluenza Virus 4 Not Detected     RSV, PCR Not Detected     Bordetella pertussis pcr Not Detected     Bordetella parapertussis PCR Not Detected     Chlamydophila pneumoniae PCR Not Detected     Mycoplasma pneumo by PCR Not Detected    Narrative:      In the setting of a positive respiratory panel with a viral infection PLUS a negative " procalcitonin without other underlying concern for bacterial infection, consider observing off antibiotics or discontinuation of antibiotics and continue supportive care. If the respiratory panel is positive for atypical bacterial infection (Bordetella pertussis, Chlamydophila pneumoniae, or Mycoplasma pneumoniae), consider antibiotic de-escalation to target atypical bacterial infection.            CT Abdomen Pelvis With & Without Contrast    Result Date: 7/12/2024  CT ABDOMEN PELVIS W WO CONTRAST Date of Exam: 7/12/2024 6:03 PM EDT Indication: GIB protocol. Comparison: None available. Technique: Axial CT images were obtained of the abdomen and pelvis before and after the uneventful intravenous administration of 75 cc Isovue-370 IV contrast . Sagittal and coronal reconstructions were performed.  Automated exposure control and iterative  reconstruction methods were used. FINDINGS: Lung bases: Mucoid impaction within both lower lobe bronchi. Fusiform 6.2 cm aneurysm of the descending thoracic aorta with continued more mild aneurysmal dilatation of the abdominal aorta. 3.1 cm fusiform aneurysm of the infrarenal abdominal aorta.. Liver:No masses. No intrahepatic biliary ductal dilatation. Spleen: Calcified granulomata Pancreas:No pancreatic masses. No evidence of pancreatitis. Gallbladder and common bile duct:No evidence of cholelithiasis. No evidence of cholecystitis. Adrenal glands:No adrenal masses Kidneys and ureters: Significantly diminished enhancement of the right kidney with severe atheromatous disease at the origin of the right kidney. The right kidney appears atrophic. Nonobstructing right renal calculus. No calculi present within the ureters. Normal caliber ureters. Urinary bladder:No urinary bladder wall thickening. No bladder masses. Small bowel: Small focus of enhancement involving the anterior gastric wall may represent the source of the GI bleed. Large bowel: Extensive colonic diverticulosis without  evidence of diverticulitis. Appendix: Normal GENITOURINARY: Normal appearance of the uterus Ascites or pneumoperitoneum:None. Adenopathy:None present Osseous structures: Left hip replacement. Prior gamma nailing of a right hip fracture. Degenerative changes of the hips. Degenerative changes of the lumbar spine. Prior vertebroplasty of T11. No lytic or blastic disease. Other findings: None     Impression: 1. Small focus of enhancement involving the anterior gastric wall may represent the origin of the patient's GI bleed. 2. There is a 6.2 cm fusiform aneurysm of the descending thoracic aorta with continuation of the aneurysm into the upper abdomen. There is a 3.1 cm aneurysm of the infrarenal abdominal aorta. 3. Significantly diminished enhancement of the right kidney with severe atheromatous disease at the origin of the right renal artery. Right renal atrophy. Findings are consistent with chronic severe atheromatous disease at the right renal artery origin. Electronically Signed: Roly Garcia MD  7/12/2024 6:46 PM EDT  Workstation ID: [id number]    XR Chest 1 View    Result Date: 7/12/2024  XR CHEST 1 VW Date of Exam: 7/12/2024 5:55 PM EDT Indication: cough Comparison: 7/3/2024 at 0910 hours FINDINGS: No new consolidations or pleural effusions are observed. The cardiac silhouette and mediastinum are stable. No acute osseous abnormalities are identified. Kyphoplasty/vertebroplasty changes are noted.     Impression: There is no significant change when compared to the prior study. There is no evidence for acute cardiopulmonary process. Electronically Signed: Murphy Hooker MD  7/12/2024 6:21 PM EDT  Workstation ID: [id number]         Current medications:  Scheduled Meds:[Transfer Hold] atorvastatin, 10 mg, Oral, Nightly  [Transfer Hold] brimonidine, 1 drop, Right Eye, BID  [Transfer Hold] busPIRone, 5 mg, Oral, BID  [Transfer Hold] dorzolamide (TRUSOPT) 2 % 1 drop, timolol (TIMOPTIC) 0.5 % 1 drop for Cosopt 22.3-6.8  mg/mL, , Right Eye, BID  doxycycline, 100 mg, Oral, BID  [Transfer Hold] ferrous sulfate, 325 mg, Oral, Daily With Lunch  [Transfer Hold] ipratropium-albuterol, 3 mL, Nebulization, 4x Daily - RT  [Transfer Hold] latanoprost, 1 drop, Both Eyes, Nightly  [Transfer Hold] pantoprazole, 40 mg, Oral, Q AM  [Transfer Hold] sodium chloride, 10 mL, Intravenous, Q12H  [Transfer Hold] traZODone, 50 mg, Oral, Nightly      Continuous Infusions:   PRN Meds:.  [Transfer Hold] ipratropium-albuterol    [Transfer Hold] melatonin    [Transfer Hold] oxyCODONE    [Transfer Hold] sodium chloride    [Transfer Hold] sodium chloride    [Transfer Hold] sodium chloride    Assessment & Plan   Assessment & Plan     Active Hospital Problems    Diagnosis  POA    **Melena [K92.1]  Yes    Pyuria [R82.81]  Yes    Leukocytosis [D72.829]  Yes    GIB (gastrointestinal bleeding) [K92.2]  Yes    Acute blood loss anemia [D62]  Yes    Cough [R05.9]  Yes    AAA (abdominal aortic aneurysm) [I71.40]  Yes    Previous back surgery [Z98.890]  Not Applicable    CHF (congestive heart failure) [I50.9]  Yes    Acute kidney injury [N17.9]  Yes    Anxiety [F41.9]  Yes    HTN (hypertension) [I10]  Yes      Resolved Hospital Problems   No resolved problems to display.        Brief Hospital Course to date:   Melena    HTN (hypertension)    Pyuria    Leukocytosis    GIB (gastrointestinal bleeding)    Acute blood loss anemia    Cough    AAA (abdominal aortic aneurysm)    Previous back surgery    CHF (congestive heart failure)    Acute kidney injury    Anxiety     Mariia Roberson is a 88 y.o. 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 Perez) who presented to the ED w/ c/o melena.      **Acute gastrointestinal hemorrhage   **Acute blood loss anemia   **Hx of iron deficiency anemia   -no prior hx of GIB, no prior hx of colonoscopy or EGD   -CT abd/pel obtained; results above  "(Small focus of enhancement involving the anterior gastric wall may represent the origin of the patient's GI bleed)  -trend H/H  -IV Protonix   -reports taking a baby ASA daily; hold  -iron supplement continued   -coags reviewed  -symptom mgt  -07/13: EGD: no acute findings      **Acute kidney injury   -likely in setting of CKD   -per CT abd/pel: Significantly diminished enhancement of the right kidney with severe atheromatous disease at the origin of the right renal artery. Right renal atrophy. Findings are consistent with chronic severe atheromatous disease at the right renal artery origin.   -previous creatinine in 1/2024 normal at 1.0  -during recent admission creatinine trend: 1.10-1.34 (1.10 at d/c on 7/2/24)  -current creatinine 1.40 w/ eGFR 39.4  -UA c/w pyuria; urine cx pending   -urine studies pending   -NS @ 75ml/hour x 12 hours   -hold routine lasix and losartan      **Cough   -started on Doxycyline (by PCP) on 7/9 for URI; cough has improved   -continue Doxycyline   -respiratory panel PCR negative   -CXR negative   -D.Dimer 1.11  -proBNP pending   -currently 94% on room air   -symptom mgt      **Pyuria   -pt denies urinary symptoms (family reports that pt has difficulty w/ starting urine stream, describing that she \"strains\")  -UA c/w pyuria; urine cx pending   -bladder scan w/ PVR     **Leukocytosis   -currently WBC 16.20 (12.05 on 7/2)-> downtrending  -afebrile   -lactic acid and procal both WNL  -sed rate and crp pending   -UA w/ pyuria; no urinary symptoms; cx pending  -CXR unremarkable   -Doxy continued for cough (started by PCP ~4 days ago); cough improved      **S/p Vertebroplasty 2/2 compression fx at T9 and T11  -7/1/24, Dr. Boyd   -d/c home on 7/3 w/ Home Health   -has f/u ~ 24th of this month w/ NS  -continues to c/o pain; prn oxycodone continued   -fall precautions   -pt/ot and case mgt consult         **AAA  -per CT abd/pel: 6.2 cm fusiform aneurysm of the descending thoracic aorta with " continuation of the aneurysm into the upper abdomen. There is a 3.1 cm aneurysm of the infrarenal abdominal aorta   -previous CT in [address] w/ reported AAA  -consider CT Surgery consult; unclear it pt follows w/ CT Surgery      **HTN   **HLD   **CHF (data deficient)   -Reviewed EKG  -proBNP pending   -routine lasix and losartan held   -statin continued   -monitor I/Os      **Anxiety   -continue Trazodone and Buspar       Expected Discharge Location and Transportation: home with assist  Expected Discharge 07/15/2024  Expected Discharge Date: 7/14/2024; Expected Discharge Time:      VTE Prophylaxis:  Mechanical VTE prophylaxis orders are present.         AM-PAC 6 Clicks Score (PT): 20 (07/12/24 2138)    CODE STATUS:   Code Status and Medical Interventions:   Ordered at: 07/12/24 2037     Medical Intervention Limits:    No intubation (DNI)     Level Of Support Discussed With:    Patient     Code Status (Patient has no pulse and is not breathing):    No CPR (Do Not Attempt to Resuscitate)     Medical Interventions (Patient has pulse or is breathing):    Limited Support     Comments:    DNR/DNI       Maddie Henry MD  07/13/24

## 2024-08-16 ENCOUNTER — TRANSCRIBE ORDERS (OUTPATIENT)
Dept: ADMINISTRATIVE | Facility: HOSPITAL | Age: 84
End: 2024-08-16
Payer: MEDICARE

## 2024-08-16 ENCOUNTER — LAB (OUTPATIENT)
Dept: LAB | Facility: HOSPITAL | Age: 84
End: 2024-08-16
Payer: MEDICARE

## 2024-08-16 DIAGNOSIS — E55.9 VITAMIN D DEFICIENCY: ICD-10-CM

## 2024-08-16 DIAGNOSIS — E78.2 MIXED HYPERLIPIDEMIA: ICD-10-CM

## 2024-08-16 DIAGNOSIS — I10 ESSENTIAL HYPERTENSION, MALIGNANT: ICD-10-CM

## 2024-08-16 DIAGNOSIS — N18.5 CRD (CHRONIC RENAL DISEASE), STAGE V: ICD-10-CM

## 2024-08-16 DIAGNOSIS — D50.8 OTHER IRON DEFICIENCY ANEMIAS: ICD-10-CM

## 2024-08-16 DIAGNOSIS — N18.5 CRD (CHRONIC RENAL DISEASE), STAGE V: Primary | ICD-10-CM

## 2024-08-16 LAB
25(OH)D3 SERPL-MCNC: 52.5 NG/ML (ref 30–100)
ALBUMIN SERPL-MCNC: 4 G/DL (ref 3.5–5.2)
ALP SERPL-CCNC: 54 U/L (ref 39–117)
ALT SERPL W P-5'-P-CCNC: 7 U/L (ref 1–33)
ANION GAP SERPL CALCULATED.3IONS-SCNC: 13.6 MMOL/L (ref 5–15)
AST SERPL-CCNC: 14 U/L (ref 1–32)
BACTERIA UR QL AUTO: ABNORMAL /HPF
BASOPHILS # BLD AUTO: 0.09 10*3/MM3 (ref 0–0.2)
BASOPHILS NFR BLD AUTO: 0.9 % (ref 0–1.5)
BILIRUB CONJ SERPL-MCNC: <0.2 MG/DL (ref 0–0.3)
BILIRUB INDIRECT SERPL-MCNC: NORMAL MG/DL
BILIRUB SERPL-MCNC: 0.2 MG/DL (ref 0–1.2)
BILIRUB UR QL STRIP: NEGATIVE
BUN SERPL-MCNC: 74 MG/DL (ref 8–23)
BUN/CREAT SERPL: 20.4 (ref 7–25)
CALCIUM SPEC-SCNC: 11 MG/DL (ref 8.6–10.5)
CHLORIDE SERPL-SCNC: 97 MMOL/L (ref 98–107)
CHOLEST SERPL-MCNC: 147 MG/DL (ref 0–200)
CLARITY UR: CLEAR
CO2 SERPL-SCNC: 21.4 MMOL/L (ref 22–29)
COLOR UR: YELLOW
CREAT SERPL-MCNC: 3.63 MG/DL (ref 0.57–1)
CREAT UR-MCNC: 79.2 MG/DL
DEPRECATED RDW RBC AUTO: 48 FL (ref 37–54)
EGFRCR SERPLBLD CKD-EPI 2021: 12 ML/MIN/1.73
EOSINOPHIL # BLD AUTO: 0.58 10*3/MM3 (ref 0–0.4)
EOSINOPHIL NFR BLD AUTO: 5.8 % (ref 0.3–6.2)
ERYTHROCYTE [DISTWIDTH] IN BLOOD BY AUTOMATED COUNT: 14.5 % (ref 12.3–15.4)
GLUCOSE SERPL-MCNC: 193 MG/DL (ref 65–99)
GLUCOSE UR STRIP-MCNC: NEGATIVE MG/DL
HCT VFR BLD AUTO: 32.1 % (ref 34–46.6)
HDLC SERPL-MCNC: 31 MG/DL (ref 40–60)
HGB BLD-MCNC: 10.4 G/DL (ref 12–15.9)
HGB UR QL STRIP.AUTO: NEGATIVE
HYALINE CASTS UR QL AUTO: ABNORMAL /LPF
IMM GRANULOCYTES # BLD AUTO: 0.05 10*3/MM3 (ref 0–0.05)
IMM GRANULOCYTES NFR BLD AUTO: 0.5 % (ref 0–0.5)
KETONES UR QL STRIP: NEGATIVE
LDLC SERPL CALC-MCNC: 64 MG/DL (ref 0–100)
LDLC/HDLC SERPL: 1.59 {RATIO}
LEUKOCYTE ESTERASE UR QL STRIP.AUTO: ABNORMAL
LYMPHOCYTES # BLD AUTO: 2.93 10*3/MM3 (ref 0.7–3.1)
LYMPHOCYTES NFR BLD AUTO: 29.3 % (ref 19.6–45.3)
MCH RBC QN AUTO: 29.1 PG (ref 26.6–33)
MCHC RBC AUTO-ENTMCNC: 32.4 G/DL (ref 31.5–35.7)
MCV RBC AUTO: 89.7 FL (ref 79–97)
MONOCYTES # BLD AUTO: 0.81 10*3/MM3 (ref 0.1–0.9)
MONOCYTES NFR BLD AUTO: 8.1 % (ref 5–12)
NEUTROPHILS NFR BLD AUTO: 5.53 10*3/MM3 (ref 1.7–7)
NEUTROPHILS NFR BLD AUTO: 55.4 % (ref 42.7–76)
NITRITE UR QL STRIP: NEGATIVE
NRBC BLD AUTO-RTO: 0 /100 WBC (ref 0–0.2)
PH UR STRIP.AUTO: 5.5 [PH] (ref 5–8)
PHOSPHATE SERPL-MCNC: 6.5 MG/DL (ref 2.5–4.5)
PLATELET # BLD AUTO: 242 10*3/MM3 (ref 140–450)
PMV BLD AUTO: 9.7 FL (ref 6–12)
POTASSIUM SERPL-SCNC: 4.9 MMOL/L (ref 3.5–5.2)
PROT ?TM UR-MCNC: 14.2 MG/DL
PROT SERPL-MCNC: 7.3 G/DL (ref 6–8.5)
PROT UR QL STRIP: ABNORMAL
PROT/CREAT UR: 0.18 MG/G{CREAT}
PTH-INTACT SERPL-MCNC: 16.6 PG/ML (ref 15–65)
RBC # BLD AUTO: 3.58 10*6/MM3 (ref 3.77–5.28)
RBC # UR STRIP: ABNORMAL /HPF
REF LAB TEST METHOD: ABNORMAL
SODIUM SERPL-SCNC: 132 MMOL/L (ref 136–145)
SP GR UR STRIP: 1.02 (ref 1–1.03)
SQUAMOUS #/AREA URNS HPF: ABNORMAL /HPF
TRIGL SERPL-MCNC: 333 MG/DL (ref 0–150)
UROBILINOGEN UR QL STRIP: ABNORMAL
VLDLC SERPL-MCNC: 52 MG/DL (ref 5–40)
WBC # UR STRIP: ABNORMAL /HPF
WBC NRBC COR # BLD AUTO: 9.99 10*3/MM3 (ref 3.4–10.8)

## 2024-08-16 PROCEDURE — 85025 COMPLETE CBC W/AUTO DIFF WBC: CPT

## 2024-08-16 PROCEDURE — 84156 ASSAY OF PROTEIN URINE: CPT

## 2024-08-16 PROCEDURE — 82570 ASSAY OF URINE CREATININE: CPT

## 2024-08-16 PROCEDURE — 81001 URINALYSIS AUTO W/SCOPE: CPT

## 2024-08-16 PROCEDURE — 80061 LIPID PANEL: CPT

## 2024-08-16 PROCEDURE — 82306 VITAMIN D 25 HYDROXY: CPT

## 2024-08-16 PROCEDURE — 36415 COLL VENOUS BLD VENIPUNCTURE: CPT

## 2024-08-16 PROCEDURE — 84100 ASSAY OF PHOSPHORUS: CPT

## 2024-08-16 PROCEDURE — 80076 HEPATIC FUNCTION PANEL: CPT

## 2024-08-16 PROCEDURE — 80048 BASIC METABOLIC PNL TOTAL CA: CPT

## 2024-08-16 PROCEDURE — 83970 ASSAY OF PARATHORMONE: CPT

## 2024-08-19 ENCOUNTER — TELEPHONE (OUTPATIENT)
Dept: CARDIOLOGY | Facility: CLINIC | Age: 84
End: 2024-08-19
Payer: MEDICARE

## 2024-08-19 NOTE — TELEPHONE ENCOUNTER
----- Message from Lola Jiang sent at 8/18/2024  8:16 PM EDT -----  Please forward labs to nephrology for review  Renal function is unchanged from prior  Triglycerides are elevated--reduce fat, carbs, and red meat in diet

## 2024-08-19 NOTE — TELEPHONE ENCOUNTER
JAMEL patient regarding results and recommendations. Voiced understanding.   Labs were sent to Dr Cotto

## 2024-08-28 ENCOUNTER — TELEPHONE (OUTPATIENT)
Dept: CARDIOLOGY | Facility: CLINIC | Age: 84
End: 2024-08-28
Payer: MEDICARE

## 2024-08-28 RX ORDER — FUROSEMIDE 40 MG
40 TABLET ORAL DAILY
Qty: 90 TABLET | Refills: 1 | Status: SHIPPED | OUTPATIENT
Start: 2024-08-28

## 2024-08-28 NOTE — TELEPHONE ENCOUNTER
The [facility] received a fax that requires your attention. The document has been indexed to the patient’s chart for your review.      Reason for sending: EXTERNAL MEDICAL RECORD NOTIFICATION     Documents Description: FUROSEMIDE REFILL-[facility]-8.27.24    Name of Sender: [facility]     Date Indexed: 8.27.24

## 2024-08-29 ENCOUNTER — OFFICE VISIT (OUTPATIENT)
Dept: CARDIOLOGY | Facility: CLINIC | Age: 84
End: 2024-08-29
Payer: MEDICARE

## 2024-08-29 VITALS
BODY MASS INDEX: 28.63 KG/M2 | WEIGHT: 182.4 LBS | HEART RATE: 77 BPM | HEIGHT: 67 IN | SYSTOLIC BLOOD PRESSURE: 153 MMHG | DIASTOLIC BLOOD PRESSURE: 74 MMHG

## 2024-08-29 DIAGNOSIS — I10 HYPERTENSION, ESSENTIAL: Chronic | ICD-10-CM

## 2024-08-29 DIAGNOSIS — I25.10 CORONARY ARTERY DISEASE INVOLVING NATIVE CORONARY ARTERY OF NATIVE HEART WITHOUT ANGINA PECTORIS: Primary | ICD-10-CM

## 2024-08-29 DIAGNOSIS — E78.2 MIXED HYPERLIPIDEMIA: ICD-10-CM

## 2024-08-29 RX ORDER — PSEUDOEPHEDRINE HCL 30 MG/1
1 TABLET ORAL EVERY 12 HOURS SCHEDULED
COMMUNITY
Start: 2024-08-07

## 2024-08-29 NOTE — ASSESSMENT & PLAN NOTE
Patient with reasonably controlled blood pressure mild systolic elevation in office today encouraged home monitoring will continue with Coreg 25 twice daily dosing

## 2024-08-29 NOTE — PROGRESS NOTES
Chief Complaint  Coronary Artery Disease, Chronic diastolic congestive heart failure, Hyperlipidemia, and Hypertension    Subjective    Patient had had some shortness of breath symptoms this summer more increased out in the hot weather denies any chest pain  Past Medical History:   Diagnosis Date    Ankle pain     BILATERAL    Arthritis     Asthma     CAD 2007    Coronary artery disease with 100% occluded right coronary artery with collaterals demonstrated on cardiac catheterization in 2007;    Chronic obstructive pulmonary disease 02/27/2024    CKD (chronic kidney disease) stage 4, GFR 15-29 ml/min 09/21/2021    COPD (chronic obstructive pulmonary disease)     Coronary artery disease 01/01/2007    Coronary artery disease with 100% occluded right coronary artery with collaterals demonstrated on cardiac catheterization in 2007;    Essential hypertension     Fracture     RIGHT FOOT ,    Gout     High cholesterol     History of emphysema     Hyperthyroidism     Myocardial infarction     Numbness in feet     Rectal bleeding 02/20/2014    Sleep apnea     Type 1 diabetes mellitus          Current Outpatient Medications:     acetaminophen (TYLENOL) 500 MG tablet, Take 1 tablet by mouth Every 6 (Six) Hours As Needed for Mild Pain., Disp: , Rfl:     albuterol sulfate  (90 Base) MCG/ACT inhaler, Inhale 2 puffs Every 4 (Four) Hours As Needed for Wheezing., Disp: , Rfl:     aspirin 81 MG EC tablet, Take 1 tablet by mouth Daily., Disp: , Rfl:     carvedilol (COREG) 25 MG tablet, Take 1 tablet by mouth 2 (Two) Times a Day., Disp: 180 tablet, Rfl: 3    Casanthranol-Docusate Sodium (LAXATIVE PLUS STOOL SOFTENER PO), Take  by mouth., Disp: , Rfl:     cetirizine (zyrTEC) 10 MG tablet, Take 1 tablet by mouth Daily., Disp: , Rfl:     febuxostat (ULORIC) 40 MG tablet, Take 1 tablet by mouth Daily., Disp: , Rfl:     Fluticasone-Umeclidin-Vilant (TRELEGY) 100-62.5-25 MCG/INH inhaler, Inhale 1 puff Daily., Disp: , Rfl:     folic  acid (FOLVITE) 1 MG tablet, Take 1 tablet by mouth Daily., Disp: , Rfl:     furosemide (LASIX) 40 MG tablet, Take 1 tablet by mouth Daily., Disp: 90 tablet, Rfl: 1    glimepiride (AMARYL) 2 MG tablet, Take 1 tablet by mouth Every Morning Before Breakfast., Disp: , Rfl:     Krill Oil 500 MG capsule, Take  by mouth., Disp: , Rfl:     levothyroxine (SYNTHROID, LEVOTHROID) 50 MCG tablet, Take 1 tablet by mouth Daily., Disp: , Rfl:     liothyronine (CYTOMEL) 5 MCG tablet, 1 tablet. Take 2 tablet in the AM and 1 tablet q pm, Disp: , Rfl:     magnesium oxide (MAGOX) 400 (241.3 Mg) MG tablet tablet, Take 1 tablet by mouth Daily., Disp: , Rfl:     nitroglycerin (NITROSTAT) 0.4 MG SL tablet, PLACE ONE TABLET UNDER TONGUE AS NEEDED FOR CHEST PAIN WAIT 5 MINUTES AND REPEAT UP TO 3 TIMES IF NEEDED, Disp: 25 tablet, Rfl: 1    rosuvastatin (CRESTOR) 40 MG tablet, Take 1 tablet by mouth Daily., Disp: 90 tablet, Rfl: 3    sevelamer (RENVELA) 800 MG tablet, Take 1 tablet by mouth., Disp: , Rfl:     SM Mucus Relief Max Strength 1200 MG tablet sustained-release 12 hour, Take 1 tablet by mouth Every 12 (Twelve) Hours., Disp: , Rfl:     Soliqua 100-33 UNT-MCG/ML solution pen-injector injection, INJECT 30 UNITS UNDER THE SKIN EVERY DAY, Disp: , Rfl:     vitamin D3 125 MCG (5000 UT) capsule capsule, Take 1 capsule by mouth Daily., Disp: , Rfl:     calcitriol (ROCALTROL) 0.25 MCG capsule, Take 1 capsule by mouth Daily. (Patient not taking: Reported on 8/29/2024), Disp: , Rfl:     mirtazapine (REMERON SOL-TAB) 15 MG disintegrating tablet, Place 1 tablet on the tongue Every Night. (Patient not taking: Reported on 8/29/2024), Disp: , Rfl:     There are no discontinued medications.  Allergies   Allergen Reactions    Penicillins Anaphylaxis    Sulfa Antibiotics Hives        Social History     Tobacco Use    Smoking status: Former     Current packs/day: 0.00     Average packs/day: 1.5 packs/day for 30.0 years (45.0 ttl pk-yrs)     Types:  "Cigarettes     Start date:      Quit date:      Years since quittin.6    Smokeless tobacco: Never   Vaping Use    Vaping status: Never Used   Substance Use Topics    Alcohol use: Yes     Comment: occasional    Drug use: Never       Family History   Problem Relation Age of Onset    Heart disease Mother     Heart disease Father     Diabetes Sister     Diabetes Brother     Breast cancer Daughter     Stroke Other     Other Other         Spine Problems     Heart attack Other     Malig Hyperthermia Neg Hx         Objective     /74 (BP Location: Right arm, Patient Position: Sitting, Cuff Size: Adult)   Pulse 77   Ht 170.2 cm (67\")   Wt 82.7 kg (182 lb 6.4 oz)   BMI 28.57 kg/m²       Physical Exam    General Appearance:   no acute distress  Alert and oriented x3  HENT:   lips not cyanotic  Atraumatic  Neck:  No jvd   supple  Respiratory:  no respiratory distress  normal breath sounds  no rales  Cardiovascular:  Regular rate and rhythm  no S3, no S4   no murmur  no rub  Extremities  No cyanosis  lower extremity edema: none    Skin:   warm, dry  No rashes      Result Review :     No results found for: \"PROBNP\"  CMP          2024    15:09 2024    13:24 2024    14:28   CMP   Glucose 225  161  193    BUN 52  54  74    Creatinine 3.18  2.68  3.63    EGFR 14.2  17.4  12.0    Sodium 139  137  132    Potassium 4.2  4.9  4.9    Chloride 100  103  97    Calcium 9.7  9.7  11.0    Total Protein   7.3    Albumin 4.1  4.3  4.0    Total Bilirubin   0.2    Alkaline Phosphatase   54    AST (SGOT)   14    ALT (SGPT)   7    BUN/Creatinine Ratio 16.4  20.1  20.4    Anion Gap 12.4  8.0  13.6      CBC w/diff          2024    15:09 2024    13:24 2024    14:28   CBC w/Diff   WBC 10.74  11.99  9.99    RBC 3.72  3.65  3.58    Hemoglobin 11.1  10.9  10.4    Hematocrit 34.0  33.8  32.1    MCV 91.4  92.6  89.7    MCH 29.8  29.9  29.1    MCHC 32.6  32.2  32.4    RDW 11.9  13.0  14.5    Platelets 231  " "305  242    Neutrophil Rel % 66.4  62.1  55.4    Immature Granulocyte Rel % 0.4  0.7  0.5    Lymphocyte Rel % 22.2  23.7  29.3    Monocyte Rel % 6.9  9.4  8.1    Eosinophil Rel % 3.5  3.2  5.8    Basophil Rel % 0.6  0.9  0.9       Lab Results   Component Value Date    TSH 2.070 09/15/2021      Lab Results   Component Value Date    FREET4 0.89 (L) 09/15/2021      No results found for: \"DDIMERQUANT\"  Magnesium   Date Value Ref Range Status   03/08/2022 2.0 1.6 - 2.4 mg/dL Final      No results found for: \"DIGOXIN\"   No results found for: \"TROPONINT\"        Lipid Panel          8/16/2024    14:28   Lipid Panel   Total Cholesterol 147    Triglycerides 333    HDL Cholesterol 31    VLDL Cholesterol 52    LDL Cholesterol  64    LDL/HDL Ratio 1.59      No results found for: \"POCTROP\"                   Diagnoses and all orders for this visit:    1. CAD (Primary)  Assessment & Plan:  Patient is doing well no ongoing angina continue with chronic aspirin 81 mg daily        2. Hypertension, essential  Assessment & Plan:  Patient with reasonably controlled blood pressure mild systolic elevation in office today encouraged home monitoring will continue with Coreg 25 twice daily dosing       3. Mixed hyperlipidemia  Assessment & Plan:  Patient's LDL currently at optimal goal range continue on  Crestor 40 nightly no myalgia symptoms              Follow Up     Return in about 6 months (around 2/28/2025) for Follow with Lola Jiang, EKG with F/U.          Patient was given instructions and counseling regarding her condition or for health maintenance advice. Please see specific information pulled into the AVS if appropriate.       "

## 2024-09-12 ENCOUNTER — LAB (OUTPATIENT)
Dept: LAB | Facility: HOSPITAL | Age: 84
End: 2024-09-12
Payer: MEDICARE

## 2024-09-12 ENCOUNTER — TRANSCRIBE ORDERS (OUTPATIENT)
Dept: LAB | Facility: HOSPITAL | Age: 84
End: 2024-09-12
Payer: MEDICARE

## 2024-09-12 DIAGNOSIS — E83.52 HYPERCALCEMIA: ICD-10-CM

## 2024-09-12 DIAGNOSIS — N18.5 CHRONIC KIDNEY DISEASE, STAGE V: Primary | ICD-10-CM

## 2024-09-12 DIAGNOSIS — N18.5 CHRONIC KIDNEY DISEASE, STAGE V: ICD-10-CM

## 2024-09-12 LAB
ALBUMIN SERPL-MCNC: 4.1 G/DL (ref 3.5–5.2)
ANION GAP SERPL CALCULATED.3IONS-SCNC: 12 MMOL/L (ref 5–15)
BASOPHILS # BLD AUTO: 0.09 10*3/MM3 (ref 0–0.2)
BASOPHILS NFR BLD AUTO: 1 % (ref 0–1.5)
BUN SERPL-MCNC: 57 MG/DL (ref 8–23)
BUN/CREAT SERPL: 15.4 (ref 7–25)
CALCIUM SPEC-SCNC: 9.9 MG/DL (ref 8.6–10.5)
CHLORIDE SERPL-SCNC: 99 MMOL/L (ref 98–107)
CO2 SERPL-SCNC: 24 MMOL/L (ref 22–29)
CREAT SERPL-MCNC: 3.7 MG/DL (ref 0.57–1)
DEPRECATED RDW RBC AUTO: 49.1 FL (ref 37–54)
EGFRCR SERPLBLD CKD-EPI 2021: 11.7 ML/MIN/1.73
EOSINOPHIL # BLD AUTO: 0.49 10*3/MM3 (ref 0–0.4)
EOSINOPHIL NFR BLD AUTO: 5.3 % (ref 0.3–6.2)
ERYTHROCYTE [DISTWIDTH] IN BLOOD BY AUTOMATED COUNT: 14.9 % (ref 12.3–15.4)
GLUCOSE SERPL-MCNC: 131 MG/DL (ref 65–99)
HCT VFR BLD AUTO: 31.7 % (ref 34–46.6)
HGB BLD-MCNC: 10.5 G/DL (ref 12–15.9)
IMM GRANULOCYTES # BLD AUTO: 0.05 10*3/MM3 (ref 0–0.05)
IMM GRANULOCYTES NFR BLD AUTO: 0.5 % (ref 0–0.5)
LYMPHOCYTES # BLD AUTO: 2.19 10*3/MM3 (ref 0.7–3.1)
LYMPHOCYTES NFR BLD AUTO: 23.5 % (ref 19.6–45.3)
MCH RBC QN AUTO: 29.6 PG (ref 26.6–33)
MCHC RBC AUTO-ENTMCNC: 33.1 G/DL (ref 31.5–35.7)
MCV RBC AUTO: 89.3 FL (ref 79–97)
MONOCYTES # BLD AUTO: 0.94 10*3/MM3 (ref 0.1–0.9)
MONOCYTES NFR BLD AUTO: 10.1 % (ref 5–12)
NEUTROPHILS NFR BLD AUTO: 5.57 10*3/MM3 (ref 1.7–7)
NEUTROPHILS NFR BLD AUTO: 59.6 % (ref 42.7–76)
NRBC BLD AUTO-RTO: 0 /100 WBC (ref 0–0.2)
PHOSPHATE SERPL-MCNC: 3.8 MG/DL (ref 2.5–4.5)
PLATELET # BLD AUTO: 256 10*3/MM3 (ref 140–450)
PMV BLD AUTO: 9.8 FL (ref 6–12)
POTASSIUM SERPL-SCNC: 4.9 MMOL/L (ref 3.5–5.2)
PTH-INTACT SERPL-MCNC: 51.3 PG/ML (ref 15–65)
RBC # BLD AUTO: 3.55 10*6/MM3 (ref 3.77–5.28)
SODIUM SERPL-SCNC: 135 MMOL/L (ref 136–145)
WBC NRBC COR # BLD AUTO: 9.33 10*3/MM3 (ref 3.4–10.8)

## 2024-09-12 PROCEDURE — 83970 ASSAY OF PARATHORMONE: CPT

## 2024-09-12 PROCEDURE — 36415 COLL VENOUS BLD VENIPUNCTURE: CPT

## 2024-09-12 PROCEDURE — 85025 COMPLETE CBC W/AUTO DIFF WBC: CPT

## 2024-09-12 PROCEDURE — 80069 RENAL FUNCTION PANEL: CPT

## 2024-10-28 ENCOUNTER — TRANSCRIBE ORDERS (OUTPATIENT)
Dept: LAB | Facility: HOSPITAL | Age: 84
End: 2024-10-28
Payer: MEDICARE

## 2024-10-28 ENCOUNTER — LAB (OUTPATIENT)
Dept: LAB | Facility: HOSPITAL | Age: 84
End: 2024-10-28
Payer: MEDICARE

## 2024-10-28 DIAGNOSIS — N18.5 CHRONIC KIDNEY DISEASE (CKD), STAGE V: ICD-10-CM

## 2024-10-28 DIAGNOSIS — N18.5 CHRONIC KIDNEY DISEASE (CKD), STAGE V: Primary | ICD-10-CM

## 2024-10-28 PROCEDURE — 85025 COMPLETE CBC W/AUTO DIFF WBC: CPT

## 2024-10-28 PROCEDURE — 80069 RENAL FUNCTION PANEL: CPT

## 2024-10-28 PROCEDURE — 36415 COLL VENOUS BLD VENIPUNCTURE: CPT

## 2024-10-29 LAB
ALBUMIN SERPL-MCNC: 3.4 G/DL (ref 3.5–5.2)
ANION GAP SERPL CALCULATED.3IONS-SCNC: 9.3 MMOL/L (ref 5–15)
BASOPHILS # BLD AUTO: 0.08 10*3/MM3 (ref 0–0.2)
BASOPHILS NFR BLD AUTO: 0.8 % (ref 0–1.5)
BUN SERPL-MCNC: 54 MG/DL (ref 8–23)
BUN/CREAT SERPL: 14.6 (ref 7–25)
CALCIUM SPEC-SCNC: 9.3 MG/DL (ref 8.6–10.5)
CHLORIDE SERPL-SCNC: 100 MMOL/L (ref 98–107)
CO2 SERPL-SCNC: 24.7 MMOL/L (ref 22–29)
CREAT SERPL-MCNC: 3.71 MG/DL (ref 0.57–1)
DEPRECATED RDW RBC AUTO: 44.4 FL (ref 37–54)
EGFRCR SERPLBLD CKD-EPI 2021: 11.7 ML/MIN/1.73
EOSINOPHIL # BLD AUTO: 0.39 10*3/MM3 (ref 0–0.4)
EOSINOPHIL NFR BLD AUTO: 3.8 % (ref 0.3–6.2)
ERYTHROCYTE [DISTWIDTH] IN BLOOD BY AUTOMATED COUNT: 13.4 % (ref 12.3–15.4)
GLUCOSE SERPL-MCNC: 231 MG/DL (ref 65–99)
HCT VFR BLD AUTO: 33 % (ref 34–46.6)
HGB BLD-MCNC: 10.5 G/DL (ref 12–15.9)
IMM GRANULOCYTES # BLD AUTO: 0.05 10*3/MM3 (ref 0–0.05)
IMM GRANULOCYTES NFR BLD AUTO: 0.5 % (ref 0–0.5)
LYMPHOCYTES # BLD AUTO: 2.6 10*3/MM3 (ref 0.7–3.1)
LYMPHOCYTES NFR BLD AUTO: 25.4 % (ref 19.6–45.3)
MCH RBC QN AUTO: 29.4 PG (ref 26.6–33)
MCHC RBC AUTO-ENTMCNC: 31.8 G/DL (ref 31.5–35.7)
MCV RBC AUTO: 92.4 FL (ref 79–97)
MONOCYTES # BLD AUTO: 0.99 10*3/MM3 (ref 0.1–0.9)
MONOCYTES NFR BLD AUTO: 9.7 % (ref 5–12)
NEUTROPHILS NFR BLD AUTO: 59.8 % (ref 42.7–76)
NEUTROPHILS NFR BLD AUTO: 6.14 10*3/MM3 (ref 1.7–7)
NRBC BLD AUTO-RTO: 0 /100 WBC (ref 0–0.2)
PHOSPHATE SERPL-MCNC: 3.6 MG/DL (ref 2.5–4.5)
PLATELET # BLD AUTO: 275 10*3/MM3 (ref 140–450)
PMV BLD AUTO: 9.6 FL (ref 6–12)
POTASSIUM SERPL-SCNC: 4.5 MMOL/L (ref 3.5–5.2)
RBC # BLD AUTO: 3.57 10*6/MM3 (ref 3.77–5.28)
SODIUM SERPL-SCNC: 134 MMOL/L (ref 136–145)
WBC NRBC COR # BLD AUTO: 10.25 10*3/MM3 (ref 3.4–10.8)

## 2024-10-30 ENCOUNTER — OFFICE VISIT (OUTPATIENT)
Dept: VASCULAR SURGERY | Facility: HOSPITAL | Age: 84
End: 2024-10-30
Payer: MEDICARE

## 2024-10-30 VITALS
HEART RATE: 64 BPM | OXYGEN SATURATION: 96 % | SYSTOLIC BLOOD PRESSURE: 114 MMHG | RESPIRATION RATE: 18 BRPM | DIASTOLIC BLOOD PRESSURE: 56 MMHG | TEMPERATURE: 97.4 F

## 2024-10-30 DIAGNOSIS — N18.5 STAGE 5 CHRONIC KIDNEY DISEASE NOT ON CHRONIC DIALYSIS: Primary | ICD-10-CM

## 2024-10-30 PROCEDURE — G0463 HOSPITAL OUTPT CLINIC VISIT: HCPCS | Performed by: SURGERY

## 2024-10-30 NOTE — PROGRESS NOTES
[facility]   Follow up Office    Patient Name: Mercedes Felipe  : 1940  MRN: [id number]  Primary Care Physician:  Rito Barahona MD      Subjective   Subjective     HPI:    Mercedes Felipe is a 84 y.o. female who had a left arm fistula created 2023.  Has been doing well.  Has not yet started dialysis but is anticipated to need to start in the next month or 2.  She is hoping to push it to the beginning of .  No other complaints at this time.      Objective     Vitals:   Temp:  [97.4 °F (36.3 °C)] 97.4 °F (36.3 °C)  Heart Rate:  [64] 64  Resp:  [18] 18  BP: (114)/(56) 114/56    Physical Exam      General: Alert, no acute distress.  HEENT: PERRLA  Abdomen: Benign  Extremities: Symmetric.  Left arm fistula: Excellent bruit and thrill.  Neuro: No gross deficits    Assessment & Plan   Assessment / Plan     Diagnoses and all orders for this visit:    1. Stage 5 chronic kidney disease not on chronic dialysis (Primary)       Assessment/Plan:   Satisfactory progress following creation of left arm fistula.  The left arm fistula is ready to be used when needed.  Follow-up with us as needed.        Electronically signed by Rajesh Murray MD, 10/30/24, 10:59 AM EDT.

## 2024-12-31 ENCOUNTER — TRANSCRIBE ORDERS (OUTPATIENT)
Dept: LAB | Facility: HOSPITAL | Age: 84
End: 2024-12-31
Payer: MEDICARE

## 2024-12-31 ENCOUNTER — LAB (OUTPATIENT)
Dept: LAB | Facility: HOSPITAL | Age: 84
End: 2024-12-31
Payer: MEDICARE

## 2024-12-31 DIAGNOSIS — N18.5 CHRONIC KIDNEY DISEASE, STAGE V: ICD-10-CM

## 2024-12-31 DIAGNOSIS — N18.5 CHRONIC KIDNEY DISEASE, STAGE V: Primary | ICD-10-CM

## 2024-12-31 LAB
ALBUMIN SERPL-MCNC: 4 G/DL (ref 3.5–5.2)
ANION GAP SERPL CALCULATED.3IONS-SCNC: 11.4 MMOL/L (ref 5–15)
BASOPHILS # BLD AUTO: 0.08 10*3/MM3 (ref 0–0.2)
BASOPHILS NFR BLD AUTO: 0.9 % (ref 0–1.5)
BUN SERPL-MCNC: 58 MG/DL (ref 8–23)
BUN/CREAT SERPL: 20.7 (ref 7–25)
CALCIUM SPEC-SCNC: 9.5 MG/DL (ref 8.6–10.5)
CHLORIDE SERPL-SCNC: 100 MMOL/L (ref 98–107)
CO2 SERPL-SCNC: 23.6 MMOL/L (ref 22–29)
CREAT SERPL-MCNC: 2.8 MG/DL (ref 0.57–1)
DEPRECATED RDW RBC AUTO: 43.8 FL (ref 37–54)
EGFRCR SERPLBLD CKD-EPI 2021: 16.4 ML/MIN/1.73
EOSINOPHIL # BLD AUTO: 0.55 10*3/MM3 (ref 0–0.4)
EOSINOPHIL NFR BLD AUTO: 6.1 % (ref 0.3–6.2)
ERYTHROCYTE [DISTWIDTH] IN BLOOD BY AUTOMATED COUNT: 13.2 % (ref 12.3–15.4)
GLUCOSE SERPL-MCNC: 124 MG/DL (ref 65–99)
HCT VFR BLD AUTO: 33.8 % (ref 34–46.6)
HGB BLD-MCNC: 11.2 G/DL (ref 12–15.9)
IMM GRANULOCYTES # BLD AUTO: 0.05 10*3/MM3 (ref 0–0.05)
IMM GRANULOCYTES NFR BLD AUTO: 0.6 % (ref 0–0.5)
LYMPHOCYTES # BLD AUTO: 2.45 10*3/MM3 (ref 0.7–3.1)
LYMPHOCYTES NFR BLD AUTO: 27.1 % (ref 19.6–45.3)
MCH RBC QN AUTO: 30.1 PG (ref 26.6–33)
MCHC RBC AUTO-ENTMCNC: 33.1 G/DL (ref 31.5–35.7)
MCV RBC AUTO: 90.9 FL (ref 79–97)
MONOCYTES # BLD AUTO: 0.8 10*3/MM3 (ref 0.1–0.9)
MONOCYTES NFR BLD AUTO: 8.8 % (ref 5–12)
NEUTROPHILS NFR BLD AUTO: 5.12 10*3/MM3 (ref 1.7–7)
NEUTROPHILS NFR BLD AUTO: 56.5 % (ref 42.7–76)
NRBC BLD AUTO-RTO: 0 /100 WBC (ref 0–0.2)
PHOSPHATE SERPL-MCNC: 3.2 MG/DL (ref 2.5–4.5)
PLATELET # BLD AUTO: 322 10*3/MM3 (ref 140–450)
PMV BLD AUTO: 9.3 FL (ref 6–12)
POTASSIUM SERPL-SCNC: 4.8 MMOL/L (ref 3.5–5.2)
RBC # BLD AUTO: 3.72 10*6/MM3 (ref 3.77–5.28)
SODIUM SERPL-SCNC: 135 MMOL/L (ref 136–145)
WBC NRBC COR # BLD AUTO: 9.05 10*3/MM3 (ref 3.4–10.8)

## 2024-12-31 PROCEDURE — 36415 COLL VENOUS BLD VENIPUNCTURE: CPT

## 2024-12-31 PROCEDURE — 85025 COMPLETE CBC W/AUTO DIFF WBC: CPT

## 2024-12-31 PROCEDURE — 80069 RENAL FUNCTION PANEL: CPT

## 2025-03-13 ENCOUNTER — OFFICE VISIT (OUTPATIENT)
Dept: CARDIOLOGY | Facility: CLINIC | Age: 85
End: 2025-03-13
Payer: MEDICARE

## 2025-03-13 VITALS
HEART RATE: 66 BPM | DIASTOLIC BLOOD PRESSURE: 57 MMHG | WEIGHT: 186.2 LBS | SYSTOLIC BLOOD PRESSURE: 112 MMHG | BODY MASS INDEX: 29.22 KG/M2 | HEIGHT: 67 IN

## 2025-03-13 DIAGNOSIS — I50.32 CHRONIC DIASTOLIC CONGESTIVE HEART FAILURE: ICD-10-CM

## 2025-03-13 DIAGNOSIS — I25.10 CORONARY ARTERY DISEASE INVOLVING NATIVE CORONARY ARTERY OF NATIVE HEART WITHOUT ANGINA PECTORIS: Primary | ICD-10-CM

## 2025-03-13 DIAGNOSIS — E78.2 MIXED HYPERLIPIDEMIA: ICD-10-CM

## 2025-03-13 DIAGNOSIS — I10 HYPERTENSION, ESSENTIAL: Chronic | ICD-10-CM

## 2025-03-13 PROBLEM — M1A.30X0: Status: ACTIVE | Noted: 2024-02-28

## 2025-03-13 PROBLEM — F33.1 MODERATE RECURRENT MAJOR DEPRESSION: Status: ACTIVE | Noted: 2023-06-21

## 2025-03-13 PROBLEM — M10.9 GOUT: Status: RESOLVED | Noted: 2021-11-01 | Resolved: 2025-03-13

## 2025-03-13 RX ORDER — SEVELAMER CARBONATE 800 MG/1
800 TABLET, FILM COATED ORAL
COMMUNITY

## 2025-03-13 RX ORDER — CARVEDILOL 25 MG/1
25 TABLET ORAL 2 TIMES DAILY
Qty: 180 TABLET | Refills: 3 | Status: SHIPPED | OUTPATIENT
Start: 2025-03-13

## 2025-03-13 RX ORDER — NITROGLYCERIN 0.4 MG/1
0.4 TABLET SUBLINGUAL
Qty: 25 TABLET | Refills: 1 | Status: SHIPPED | OUTPATIENT
Start: 2025-03-13

## 2025-03-13 NOTE — PROGRESS NOTES
Chief Complaint  Follow-up, Coronary Artery Disease, Hypertension, and Hyperlipidemia    Subjective            History of Present Illness  Mercedes Felipe is an 84-year-old female patient who presents to the office today for follow up.    History of Present Illness  The patient presents for follow-up of coronary artery disease, congestive heart failure, high blood pressure, and cholesterol management.    She reports no new or worsening cardiac symptoms since her last visit. She has not required the use of nitroglycerin, although she finds comfort in having it available. She is on aspirin 81 mg daily for coronary disease.    She is on carvedilol 25 mg twice daily for blood pressure control.    She is on Lasix 40 mg daily to prevent fluid retention. She notes that her condition has improved since her assisted, attributing this to increased physical activity during the day compared to her previous sedentary lifestyle.    She is on rosuvastatin 40 mg daily for cholesterol control. She acknowledges a high intake of potatoes in her diet, which she attempts to reduce but often finds challenging. She is currently taking Krill oil supplements and magnesium 400 mg daily.      MEDICATIONS  aspirin, carvedilol, Lasix, rosuvastatin, nitroglycerin, glimepiride, Krill oil, magnesium        PMH  Past Medical History:   Diagnosis Date    Ankle pain     BILATERAL    Arthritis     Asthma     CAD 2007    Coronary artery disease with 100% occluded right coronary artery with collaterals demonstrated on cardiac catheterization in 2007;    Chronic obstructive pulmonary disease 02/27/2024    CKD (chronic kidney disease) stage 4, GFR 15-29 ml/min 09/21/2021    COPD (chronic obstructive pulmonary disease)     Coronary artery disease 01/01/2007    Coronary artery disease with 100% occluded right coronary artery with collaterals demonstrated on cardiac catheterization in 2007;    Essential hypertension     Fracture     RIGHT FOOT ,    Gout      High cholesterol     History of emphysema     Hyperthyroidism     Myocardial infarction     Numbness in feet     Rectal bleeding 2014    Sleep apnea     Type 1 diabetes mellitus          ALLERGY  Allergies   Allergen Reactions    Penicillins Anaphylaxis    Glimepiride Unknown - Low Severity     glimepiride    Sulfa Antibiotics Hives          SURGICALHX  Past Surgical History:   Procedure Laterality Date    ARTERIOVENOUS FISTULA/SHUNT SURGERY Left 2023    Procedure: Creation of left arm arteriovenous fistula;  Surgeon: Rajesh Murray MD;  Location: [facility] MAIN OR;  Service: Vascular;  Laterality: Left;    BACK SURGERY      BREAST SURGERY      DILATATION AND CURETTAGE      TOTAL THYROIDECTOMY Left     TUBAL ABDOMINAL LIGATION            SOC  Social History     Socioeconomic History    Marital status:    Tobacco Use    Smoking status: Former     Current packs/day: 0.00     Average packs/day: 1.5 packs/day for 30.0 years (45.0 ttl pk-yrs)     Types: Cigarettes     Start date:      Quit date:      Years since quittin.2    Smokeless tobacco: Never   Vaping Use    Vaping status: Never Used   Substance and Sexual Activity    Alcohol use: Yes     Comment: occasional    Drug use: Never         FAMHX  Family History   Problem Relation Age of Onset    Heart disease Mother     Heart disease Father     Diabetes Sister     Diabetes Brother     Breast cancer Daughter     Stroke Other     Other Other         Spine Problems     Heart attack Other     Malig Hyperthermia Neg Hx           MEDSIGONLY  Current Outpatient Medications on File Prior to Visit   Medication Sig    acetaminophen (TYLENOL) 500 MG tablet Take 1 tablet by mouth Every 6 (Six) Hours As Needed for Mild Pain.    albuterol sulfate  (90 Base) MCG/ACT inhaler Inhale 2 puffs Every 4 (Four) Hours As Needed for Wheezing.    aspirin 81 MG EC tablet Take 1 tablet by mouth Daily.    calcitriol (ROCALTROL) 0.25 MCG capsule Take 1 capsule  "by mouth Daily.    carvedilol (COREG) 25 MG tablet Take 1 tablet by mouth 2 (Two) Times a Day.    Casanthranol-Docusate Sodium (LAXATIVE PLUS STOOL SOFTENER PO) Take  by mouth.    cetirizine (zyrTEC) 10 MG tablet Take 1 tablet by mouth Daily.    febuxostat (ULORIC) 40 MG tablet Take 1 tablet by mouth Daily.    Fluticasone-Umeclidin-Vilant (TRELEGY) 100-62.5-25 MCG/INH inhaler Inhale 1 puff Daily.    folic acid (FOLVITE) 1 MG tablet Take 1 tablet by mouth Daily.    furosemide (LASIX) 40 MG tablet Take 1 tablet by mouth Daily.    glimepiride (AMARYL) 2 MG tablet Take 1 tablet by mouth Every Morning Before Breakfast.    Krill Oil 500 MG capsule Take  by mouth.    levothyroxine (SYNTHROID, LEVOTHROID) 50 MCG tablet Take 1 tablet by mouth Daily.    liothyronine (CYTOMEL) 5 MCG tablet 1 tablet. Take 2 tablet in the AM and 1 tablet q pm    magnesium oxide (MAGOX) 400 (241.3 Mg) MG tablet tablet Take 1 tablet by mouth Daily.    nitroglycerin (NITROSTAT) 0.4 MG SL tablet PLACE ONE TABLET UNDER TONGUE AS NEEDED FOR CHEST PAIN WAIT 5 MINUTES AND REPEAT UP TO 3 TIMES IF NEEDED    rosuvastatin (CRESTOR) 40 MG tablet Take 1 tablet by mouth Daily.    sevelamer (RENVELA) 800 MG tablet Take 1 tablet by mouth 3 (Three) Times a Day With Meals.    Soliqua 100-33 UNT-MCG/ML solution pen-injector injection INJECT 30 UNITS UNDER THE SKIN EVERY DAY    vitamin D3 125 MCG (5000 UT) capsule capsule Take 1 capsule by mouth Daily.    [DISCONTINUED] mirtazapine (REMERON SOL-TAB) 15 MG disintegrating tablet Place 1 tablet on the tongue Every Night. (Patient not taking: Reported on 8/29/2024)    [DISCONTINUED] SM Mucus Relief Max Strength 1200 MG tablet sustained-release 12 hour Take 1 tablet by mouth Every 12 (Twelve) Hours.     No current facility-administered medications on file prior to visit.         Objective   /57   Pulse 66   Ht 170.2 cm (67.01\")   Wt 84.5 kg (186 lb 3.2 oz)   BMI 29.16 kg/m²       Physical Exam  HENT:      Head: " "Normocephalic.   Neck:      Vascular: No carotid bruit.   Cardiovascular:      Rate and Rhythm: Normal rate and regular rhythm.      Pulses: Normal pulses.      Heart sounds: Normal heart sounds. No murmur heard.  Pulmonary:      Effort: Pulmonary effort is normal.      Breath sounds: Normal breath sounds.   Musculoskeletal:      Cervical back: Neck supple.      Right lower leg: No edema.      Left lower leg: No edema.   Skin:     General: Skin is dry.   Neurological:      Mental Status: She is alert and oriented to person, place, and time.   Psychiatric:         Behavior: Behavior normal.       ECG 12 Lead    Date/Time: 3/13/2025 9:03 AM  Performed by: Lola Jiang APRN    Authorized by: Lola Jiang APRN  Comparison: compared with previous ECG from 4/25/2023  Similar to previous ECG  Rhythm: sinus rhythm  Rate: normal  BPM: 62  Conduction: conduction normal  ST Segments: ST segments normal  T Waves: T waves normal  QRS axis: normal  Other: no other findings    Clinical impression: normal ECG          Result Review :   The following data was reviewed by: CAS Neri on 03/13/2025:  No results found for: \"PROBNP\"  CMP          10/28/2024    14:45 12/31/2024    14:08   CMP   Glucose 231  124    BUN 54  58    Creatinine 3.71  2.80    EGFR 11.7  16.4    Sodium 134  135    Potassium 4.5  4.8    Chloride 100  100    Calcium 9.3  9.5    Albumin 3.4  4.0    BUN/Creatinine Ratio 14.6  20.7    Anion Gap 9.3  11.4      CBC w/diff          10/28/2024    14:45 12/31/2024    14:08   CBC w/Diff   WBC 10.25  9.05    RBC 3.57  3.72    Hemoglobin 10.5  11.2    Hematocrit 33.0  33.8    MCV 92.4  90.9    MCH 29.4  30.1    MCHC 31.8  33.1    RDW 13.4  13.2    Platelets 275  322    Neutrophil Rel % 59.8  56.5    Immature Granulocyte Rel % 0.5  0.6    Lymphocyte Rel % 25.4  27.1    Monocyte Rel % 9.7  8.8    Eosinophil Rel % 3.8  6.1    Basophil Rel % 0.8  0.9       Lab Results   Component Value Date    TSH " "2.070 09/15/2021      Lab Results   Component Value Date    FREET4 0.89 (L) 09/15/2021      No results found for: \"DDIMERQUANT\"  Magnesium   Date Value Ref Range Status   03/08/2022 2.0 1.6 - 2.4 mg/dL Final      No results found for: \"DIGOXIN\"   No results found for: \"TROPONINT\"        Lipid Panel          8/16/2024    14:28   Lipid Panel   Total Cholesterol 147    Triglycerides 333    HDL Cholesterol 31    VLDL Cholesterol 52    LDL Cholesterol  64    LDL/HDL Ratio 1.59             Assessment and Plan    Diagnoses and all orders for this visit:    1. CAD (Primary)    2. Chronic diastolic congestive heart failure    3. Hypertension, essential    4. Mixed hyperlipidemia      Assessment & Plan  1. Coronary Artery Disease.  Her EKG today shows normal sinus rhythm with a heart rate of 62. She reports no new or worsening symptoms. She will continue taking aspirin 81 mg daily. A refill for nitroglycerin tablets has been provided.    2. Congestive Heart Failure.  There are no signs of fluid retention, and her symptoms are well-controlled with the current medication regimen. She will continue taking Lasix 40 mg daily.    3. Hypertension.  Her blood pressure is well-controlled with the current medication regimen. She will continue taking carvedilol 25 mg twice daily.    4. Cholesterol Management.  Her total cholesterol and LDL levels are within the normal range, but her triglyceride levels remain elevated around 300. She is advised to continue her current diet and consider reducing carbohydrate and sugar intake. She will continue taking rosuvastatin 40 mg daily. A repeat cholesterol panel will be done before her next visit.    Follow-up  The patient will follow up in 6 months.      Follow Up   Return in about 6 months (around 9/13/2025) for Follow up with Dr Hale.    Patient was given instructions and counseling regarding her condition or for health maintenance advice. Please see specific information pulled into the AVS if " appropriate.     Mercedes Felipe  reports that she quit smoking about 25 years ago. Her smoking use included cigarettes. She started smoking about 55 years ago. She has a 45 pack-year smoking history. She has never used smokeless tobacco.           Patient or patient representative verbalized consent for the use of Ambient Listening during the visit with  CAS Neri for chart documentation. 3/14/2025  12:13 EDT    CAS Neri  03/13/25  08:42 EDT    Dictated Utilizing Dragon Dictation

## 2025-04-30 ENCOUNTER — TRANSCRIBE ORDERS (OUTPATIENT)
Dept: LAB | Facility: HOSPITAL | Age: 85
End: 2025-04-30
Payer: MEDICARE

## 2025-04-30 ENCOUNTER — LAB (OUTPATIENT)
Dept: LAB | Facility: HOSPITAL | Age: 85
End: 2025-04-30
Payer: MEDICARE

## 2025-04-30 DIAGNOSIS — M1A.30X0 CHRONIC GOUT DUE TO RENAL IMPAIRMENT WITHOUT TOPHUS, UNSPECIFIED SITE: ICD-10-CM

## 2025-04-30 DIAGNOSIS — I50.9 HEART FAILURE, UNSPECIFIED HF CHRONICITY, UNSPECIFIED HEART FAILURE TYPE: ICD-10-CM

## 2025-04-30 DIAGNOSIS — I25.10 DISEASE OF CARDIOVASCULAR SYSTEM: ICD-10-CM

## 2025-04-30 DIAGNOSIS — E21.1 SECONDARY HYPERPARATHYROIDISM, NON-RENAL: ICD-10-CM

## 2025-04-30 DIAGNOSIS — E87.20 ACIDOSIS: ICD-10-CM

## 2025-04-30 DIAGNOSIS — D50.8 IRON DEFICIENCY ANEMIA SECONDARY TO INADEQUATE DIETARY IRON INTAKE: ICD-10-CM

## 2025-04-30 DIAGNOSIS — E11.22 TYPE 2 DIABETES MELLITUS WITH DIABETIC CHRONIC KIDNEY DISEASE, UNSPECIFIED CKD STAGE, UNSPECIFIED WHETHER LONG TERM INSULIN USE: ICD-10-CM

## 2025-04-30 DIAGNOSIS — I50.1 LEFT HEART FAILURE: ICD-10-CM

## 2025-04-30 DIAGNOSIS — N18.5 CHRONIC KIDNEY DISEASE, STAGE V: Primary | ICD-10-CM

## 2025-04-30 DIAGNOSIS — M10.00 IDIOPATHIC GOUT, UNSPECIFIED CHRONICITY, UNSPECIFIED SITE: ICD-10-CM

## 2025-04-30 DIAGNOSIS — N18.5 CHRONIC KIDNEY DISEASE, STAGE V: ICD-10-CM

## 2025-04-30 DIAGNOSIS — E55.9 VITAMIN D DEFICIENCY, UNSPECIFIED: ICD-10-CM

## 2025-04-30 LAB
25(OH)D3 SERPL-MCNC: 38.6 NG/ML (ref 30–100)
ALBUMIN SERPL-MCNC: 3.7 G/DL (ref 3.5–5.2)
ANION GAP SERPL CALCULATED.3IONS-SCNC: 9.8 MMOL/L (ref 5–15)
BACTERIA UR QL AUTO: NORMAL /HPF
BASOPHILS # BLD AUTO: 0.07 10*3/MM3 (ref 0–0.2)
BASOPHILS NFR BLD AUTO: 0.9 % (ref 0–1.5)
BILIRUB UR QL STRIP: NEGATIVE
BUN SERPL-MCNC: 55 MG/DL (ref 8–23)
BUN/CREAT SERPL: 17.6 (ref 7–25)
CALCIUM SPEC-SCNC: 9.4 MG/DL (ref 8.6–10.5)
CHLORIDE SERPL-SCNC: 103 MMOL/L (ref 98–107)
CLARITY UR: CLEAR
CO2 SERPL-SCNC: 23.2 MMOL/L (ref 22–29)
COLOR UR: YELLOW
CREAT SERPL-MCNC: 3.13 MG/DL (ref 0.57–1)
CREAT UR-MCNC: 179.2 MG/DL
DEPRECATED RDW RBC AUTO: 42.3 FL (ref 37–54)
EGFRCR SERPLBLD CKD-EPI 2021: 14.3 ML/MIN/1.73
EOSINOPHIL # BLD AUTO: 0.34 10*3/MM3 (ref 0–0.4)
EOSINOPHIL NFR BLD AUTO: 4.5 % (ref 0.3–6.2)
ERYTHROCYTE [DISTWIDTH] IN BLOOD BY AUTOMATED COUNT: 13 % (ref 12.3–15.4)
GLUCOSE SERPL-MCNC: 252 MG/DL (ref 65–99)
GLUCOSE UR STRIP-MCNC: ABNORMAL MG/DL
HCT VFR BLD AUTO: 34.4 % (ref 34–46.6)
HGB BLD-MCNC: 11.2 G/DL (ref 12–15.9)
HGB UR QL STRIP.AUTO: NEGATIVE
HYALINE CASTS UR QL AUTO: NORMAL /LPF
IMM GRANULOCYTES # BLD AUTO: 0.03 10*3/MM3 (ref 0–0.05)
IMM GRANULOCYTES NFR BLD AUTO: 0.4 % (ref 0–0.5)
KETONES UR QL STRIP: ABNORMAL
LEUKOCYTE ESTERASE UR QL STRIP.AUTO: ABNORMAL
LYMPHOCYTES # BLD AUTO: 2.31 10*3/MM3 (ref 0.7–3.1)
LYMPHOCYTES NFR BLD AUTO: 30.5 % (ref 19.6–45.3)
MCH RBC QN AUTO: 29.6 PG (ref 26.6–33)
MCHC RBC AUTO-ENTMCNC: 32.6 G/DL (ref 31.5–35.7)
MCV RBC AUTO: 90.8 FL (ref 79–97)
MONOCYTES # BLD AUTO: 0.71 10*3/MM3 (ref 0.1–0.9)
MONOCYTES NFR BLD AUTO: 9.4 % (ref 5–12)
NEUTROPHILS NFR BLD AUTO: 4.11 10*3/MM3 (ref 1.7–7)
NEUTROPHILS NFR BLD AUTO: 54.3 % (ref 42.7–76)
NITRITE UR QL STRIP: NEGATIVE
NRBC BLD AUTO-RTO: 0 /100 WBC (ref 0–0.2)
PH UR STRIP.AUTO: 5.5 [PH] (ref 5–8)
PHOSPHATE SERPL-MCNC: 2.8 MG/DL (ref 2.5–4.5)
PLATELET # BLD AUTO: 235 10*3/MM3 (ref 140–450)
PMV BLD AUTO: 10 FL (ref 6–12)
POTASSIUM SERPL-SCNC: 4.9 MMOL/L (ref 3.5–5.2)
PROT ?TM UR-MCNC: 12.8 MG/DL
PROT UR QL STRIP: ABNORMAL
PROT/CREAT UR: 0.07 MG/G{CREAT}
PTH-INTACT SERPL-MCNC: 138 PG/ML (ref 15–65)
RBC # BLD AUTO: 3.79 10*6/MM3 (ref 3.77–5.28)
RBC # UR STRIP: NORMAL /HPF
REF LAB TEST METHOD: NORMAL
SODIUM SERPL-SCNC: 136 MMOL/L (ref 136–145)
SP GR UR STRIP: 1.02 (ref 1–1.03)
SQUAMOUS #/AREA URNS HPF: NORMAL /HPF
UROBILINOGEN UR QL STRIP: ABNORMAL
WBC # UR STRIP: NORMAL /HPF
WBC NRBC COR # BLD AUTO: 7.57 10*3/MM3 (ref 3.4–10.8)

## 2025-04-30 PROCEDURE — 83970 ASSAY OF PARATHORMONE: CPT

## 2025-04-30 PROCEDURE — 84156 ASSAY OF PROTEIN URINE: CPT

## 2025-04-30 PROCEDURE — 85025 COMPLETE CBC W/AUTO DIFF WBC: CPT

## 2025-04-30 PROCEDURE — 82306 VITAMIN D 25 HYDROXY: CPT

## 2025-04-30 PROCEDURE — 80069 RENAL FUNCTION PANEL: CPT

## 2025-04-30 PROCEDURE — 82570 ASSAY OF URINE CREATININE: CPT

## 2025-04-30 PROCEDURE — 81001 URINALYSIS AUTO W/SCOPE: CPT

## 2025-04-30 PROCEDURE — 36415 COLL VENOUS BLD VENIPUNCTURE: CPT

## 2025-05-12 ENCOUNTER — TRANSCRIBE ORDERS (OUTPATIENT)
Dept: ADMINISTRATIVE | Facility: HOSPITAL | Age: 85
End: 2025-05-12
Payer: MEDICARE

## 2025-05-12 DIAGNOSIS — R09.89 OTHER SPECIFIED SYMPTOMS AND SIGNS INVOLVING THE CIRCULATORY AND RESPIRATORY SYSTEMS: Primary | ICD-10-CM

## 2025-05-23 ENCOUNTER — HOSPITAL ENCOUNTER (OUTPATIENT)
Dept: CARDIOLOGY | Facility: HOSPITAL | Age: 85
Discharge: HOME | End: 2025-05-23
Payer: MEDICARE

## 2025-05-23 DIAGNOSIS — R09.89 OTHER SPECIFIED SYMPTOMS AND SIGNS INVOLVING THE CIRCULATORY AND RESPIRATORY SYSTEMS: ICD-10-CM

## 2025-05-23 LAB
BH CV XLRA MEAS LEFT CAROTID BULB EDV: 14.3 CM/SEC
BH CV XLRA MEAS LEFT CAROTID BULB PSV: 70.1 CM/SEC
BH CV XLRA MEAS LEFT DIST CCA EDV: 10 CM/SEC
BH CV XLRA MEAS LEFT DIST CCA PSV: 74.9 CM/SEC
BH CV XLRA MEAS LEFT DIST ICA EDV: 20.2 CM/SEC
BH CV XLRA MEAS LEFT DIST ICA PSV: 91.1 CM/SEC
BH CV XLRA MEAS LEFT ICA/CCA RATIO: 1.54
BH CV XLRA MEAS LEFT MID ICA EDV: 24.8 CM/SEC
BH CV XLRA MEAS LEFT MID ICA PSV: 115.5 CM/SEC
BH CV XLRA MEAS LEFT PROX CCA EDV: 10 CM/SEC
BH CV XLRA MEAS LEFT PROX CCA PSV: 84.8 CM/SEC
BH CV XLRA MEAS LEFT PROX ECA EDV: 0 CM/SEC
BH CV XLRA MEAS LEFT PROX ECA PSV: 64.5 CM/SEC
BH CV XLRA MEAS LEFT PROX ICA EDV: 18.2 CM/SEC
BH CV XLRA MEAS LEFT PROX ICA PSV: 104.5 CM/SEC
BH CV XLRA MEAS LEFT PROX SCLA EDV: 125.1 CM/SEC
BH CV XLRA MEAS LEFT PROX SCLA PSV: 331.9 CM/SEC
BH CV XLRA MEAS LEFT VERTEBRAL A EDV: 8.8 CM/SEC
BH CV XLRA MEAS LEFT VERTEBRAL A PSV: 45.2 CM/SEC
BH CV XLRA MEAS RIGHT CAROTID BULB EDV: 6.4 CM/SEC
BH CV XLRA MEAS RIGHT CAROTID BULB PSV: 66 CM/SEC
BH CV XLRA MEAS RIGHT DIST CCA EDV: 8.2 CM/SEC
BH CV XLRA MEAS RIGHT DIST CCA PSV: 66.8 CM/SEC
BH CV XLRA MEAS RIGHT DIST ICA EDV: 20.4 CM/SEC
BH CV XLRA MEAS RIGHT DIST ICA PSV: 85.2 CM/SEC
BH CV XLRA MEAS RIGHT ICA/CCA RATIO: 1.28
BH CV XLRA MEAS RIGHT MID ICA EDV: 14.1 CM/SEC
BH CV XLRA MEAS RIGHT MID ICA PSV: 65.9 CM/SEC
BH CV XLRA MEAS RIGHT PROX CCA EDV: 7.4 CM/SEC
BH CV XLRA MEAS RIGHT PROX CCA PSV: 79.3 CM/SEC
BH CV XLRA MEAS RIGHT PROX ECA EDV: 0 CM/SEC
BH CV XLRA MEAS RIGHT PROX ECA PSV: 70.9 CM/SEC
BH CV XLRA MEAS RIGHT PROX ICA EDV: 11.9 CM/SEC
BH CV XLRA MEAS RIGHT PROX ICA PSV: 62.4 CM/SEC
BH CV XLRA MEAS RIGHT VERTEBRAL A EDV: 12.9 CM/SEC
BH CV XLRA MEAS RIGHT VERTEBRAL A PSV: 50.7 CM/SEC
LEFT ARM BP: NORMAL MMHG
RIGHT ARM BP: NORMAL MMHG

## 2025-05-23 PROCEDURE — 93880 EXTRACRANIAL BILAT STUDY: CPT

## 2025-08-05 ENCOUNTER — TRANSCRIBE ORDERS (OUTPATIENT)
Dept: ADMINISTRATIVE | Facility: HOSPITAL | Age: 85
End: 2025-08-05
Payer: MEDICARE

## 2025-08-05 ENCOUNTER — LAB (OUTPATIENT)
Dept: LAB | Facility: HOSPITAL | Age: 85
End: 2025-08-05
Payer: MEDICARE

## 2025-08-05 DIAGNOSIS — E55.9 VITAMIN D DEFICIENCY: ICD-10-CM

## 2025-08-05 DIAGNOSIS — N18.5 CHRONIC KIDNEY DISEASE, STAGE V (VERY SEVERE): Primary | ICD-10-CM

## 2025-08-05 DIAGNOSIS — E78.00 PURE HYPERCHOLESTEROLEMIA: Primary | ICD-10-CM

## 2025-08-05 DIAGNOSIS — N18.5 CHRONIC KIDNEY DISEASE, STAGE V (VERY SEVERE): ICD-10-CM

## 2025-08-05 DIAGNOSIS — E78.00 PURE HYPERCHOLESTEROLEMIA: ICD-10-CM

## 2025-08-05 LAB
ALBUMIN SERPL-MCNC: 3.7 G/DL (ref 3.5–5.2)
ANION GAP SERPL CALCULATED.3IONS-SCNC: 10 MMOL/L (ref 5–15)
BACTERIA UR QL AUTO: NORMAL /HPF
BASOPHILS # BLD AUTO: 0.07 10*3/MM3 (ref 0–0.2)
BASOPHILS NFR BLD AUTO: 0.8 % (ref 0–1.5)
BILIRUB UR QL STRIP: NEGATIVE
BUN SERPL-MCNC: 36 MG/DL (ref 8–23)
BUN/CREAT SERPL: 14.1 (ref 7–25)
CALCIUM SPEC-SCNC: 9.2 MG/DL (ref 8.6–10.5)
CHLORIDE SERPL-SCNC: 102 MMOL/L (ref 98–107)
CHOLEST SERPL-MCNC: 112 MG/DL (ref 0–200)
CLARITY UR: CLEAR
CO2 SERPL-SCNC: 27 MMOL/L (ref 22–29)
COLOR UR: YELLOW
CREAT SERPL-MCNC: 2.56 MG/DL (ref 0.57–1)
CREAT UR-MCNC: 52.7 MG/DL
DEPRECATED RDW RBC AUTO: 46.7 FL (ref 37–54)
EGFRCR SERPLBLD CKD-EPI 2021: 18.1 ML/MIN/1.73
EOSINOPHIL # BLD AUTO: 0.36 10*3/MM3 (ref 0–0.4)
EOSINOPHIL NFR BLD AUTO: 4.3 % (ref 0.3–6.2)
ERYTHROCYTE [DISTWIDTH] IN BLOOD BY AUTOMATED COUNT: 13.3 % (ref 12.3–15.4)
GLUCOSE SERPL-MCNC: 121 MG/DL (ref 65–99)
GLUCOSE UR STRIP-MCNC: NEGATIVE MG/DL
HCT VFR BLD AUTO: 34.2 % (ref 34–46.6)
HDLC SERPL-MCNC: 36 MG/DL (ref 40–60)
HGB BLD-MCNC: 11.2 G/DL (ref 12–15.9)
HGB UR QL STRIP.AUTO: NEGATIVE
HYALINE CASTS UR QL AUTO: NORMAL /LPF
IMM GRANULOCYTES # BLD AUTO: 0.03 10*3/MM3 (ref 0–0.05)
IMM GRANULOCYTES NFR BLD AUTO: 0.4 % (ref 0–0.5)
KETONES UR QL STRIP: NEGATIVE
LDLC SERPL CALC-MCNC: 48 MG/DL (ref 0–100)
LDLC/HDLC SERPL: 1.19 {RATIO}
LEUKOCYTE ESTERASE UR QL STRIP.AUTO: ABNORMAL
LYMPHOCYTES # BLD AUTO: 2.94 10*3/MM3 (ref 0.7–3.1)
LYMPHOCYTES NFR BLD AUTO: 35.1 % (ref 19.6–45.3)
MCH RBC QN AUTO: 31.2 PG (ref 26.6–33)
MCHC RBC AUTO-ENTMCNC: 32.7 G/DL (ref 31.5–35.7)
MCV RBC AUTO: 95.3 FL (ref 79–97)
MONOCYTES # BLD AUTO: 0.91 10*3/MM3 (ref 0.1–0.9)
MONOCYTES NFR BLD AUTO: 10.9 % (ref 5–12)
NEUTROPHILS NFR BLD AUTO: 4.07 10*3/MM3 (ref 1.7–7)
NEUTROPHILS NFR BLD AUTO: 48.5 % (ref 42.7–76)
NITRITE UR QL STRIP: NEGATIVE
NRBC BLD AUTO-RTO: 0 /100 WBC (ref 0–0.2)
PH UR STRIP.AUTO: 6 [PH] (ref 5–8)
PHOSPHATE SERPL-MCNC: 3.9 MG/DL (ref 2.5–4.5)
PLATELET # BLD AUTO: 221 10*3/MM3 (ref 140–450)
PMV BLD AUTO: 10.1 FL (ref 6–12)
POTASSIUM SERPL-SCNC: 4.3 MMOL/L (ref 3.5–5.2)
PROT ?TM UR-MCNC: 15.7 MG/DL
PROT UR QL STRIP: ABNORMAL
PROT/CREAT UR: 0.3 MG/G{CREAT}
RBC # BLD AUTO: 3.59 10*6/MM3 (ref 3.77–5.28)
RBC # UR STRIP: NORMAL /HPF
REF LAB TEST METHOD: NORMAL
SODIUM SERPL-SCNC: 139 MMOL/L (ref 136–145)
SP GR UR STRIP: 1.01 (ref 1–1.03)
SQUAMOUS #/AREA URNS HPF: NORMAL /HPF
TRIGL SERPL-MCNC: 166 MG/DL (ref 0–150)
URATE SERPL-MCNC: 4.2 MG/DL (ref 2.4–5.7)
UROBILINOGEN UR QL STRIP: ABNORMAL
VLDLC SERPL-MCNC: 28 MG/DL (ref 5–40)
WBC # UR STRIP: NORMAL /HPF
WBC NRBC COR # BLD AUTO: 8.38 10*3/MM3 (ref 3.4–10.8)

## 2025-08-05 PROCEDURE — 36415 COLL VENOUS BLD VENIPUNCTURE: CPT

## 2025-08-05 PROCEDURE — 80069 RENAL FUNCTION PANEL: CPT

## 2025-08-05 PROCEDURE — 84550 ASSAY OF BLOOD/URIC ACID: CPT

## 2025-08-05 PROCEDURE — 85025 COMPLETE CBC W/AUTO DIFF WBC: CPT

## 2025-08-05 PROCEDURE — 84156 ASSAY OF PROTEIN URINE: CPT

## 2025-08-05 PROCEDURE — 81001 URINALYSIS AUTO W/SCOPE: CPT

## 2025-08-05 PROCEDURE — 80061 LIPID PANEL: CPT

## 2025-08-05 PROCEDURE — 82570 ASSAY OF URINE CREATININE: CPT

## 2025-08-06 ENCOUNTER — TRANSCRIBE ORDERS (OUTPATIENT)
Dept: ADMINISTRATIVE | Facility: HOSPITAL | Age: 85
End: 2025-08-06
Payer: MEDICARE

## 2025-08-06 ENCOUNTER — LAB (OUTPATIENT)
Dept: LAB | Facility: HOSPITAL | Age: 85
End: 2025-08-06
Payer: MEDICARE

## 2025-08-06 DIAGNOSIS — E11.9 DIABETES MELLITUS WITHOUT COMPLICATION: ICD-10-CM

## 2025-08-06 DIAGNOSIS — E03.9 HYPOTHYROIDISM, UNSPECIFIED TYPE: Primary | ICD-10-CM

## 2025-08-06 DIAGNOSIS — E03.9 HYPOTHYROIDISM, UNSPECIFIED TYPE: ICD-10-CM

## 2025-08-06 LAB
HBA1C MFR BLD: 6.7 % (ref 4.8–5.6)
T3 SERPL-MCNC: 139 NG/DL (ref 80–200)
T4 FREE SERPL-MCNC: 0.84 NG/DL (ref 0.92–1.68)
TSH SERPL DL<=0.05 MIU/L-ACNC: 1.39 UIU/ML (ref 0.27–4.2)

## 2025-08-06 PROCEDURE — 84482 T3 REVERSE: CPT

## 2025-08-06 PROCEDURE — 36415 COLL VENOUS BLD VENIPUNCTURE: CPT

## 2025-08-06 PROCEDURE — 84480 ASSAY TRIIODOTHYRONINE (T3): CPT

## 2025-08-06 PROCEDURE — 83036 HEMOGLOBIN GLYCOSYLATED A1C: CPT

## 2025-08-06 PROCEDURE — 84439 ASSAY OF FREE THYROXINE: CPT

## 2025-08-06 PROCEDURE — 84443 ASSAY THYROID STIM HORMONE: CPT

## 2025-08-11 LAB — T3REVERSE SERPL-MCNC: 21.2 NG/DL (ref 9.2–24.1)

## (undated) DEVICE — GLV SURG SENSICARE PI ORTHO SZ7.5 LF STRL

## (undated) DEVICE — SUT SILK 2/0 TIES 18IN A185H

## (undated) DEVICE — LIGHT SLEEVE: Brand: DEVON

## (undated) DEVICE — PENCL E/S SMOKEEVAC W/TELESCP CANN

## (undated) DEVICE — ARM VASCULAR-LF: Brand: MEDLINE INDUSTRIES, INC.

## (undated) DEVICE — SUT VIC 3/0 SH 27IN J416H

## (undated) DEVICE — STERILE POLYISOPRENE POWDER-FREE SURGICAL GLOVES: Brand: PROTEXIS

## (undated) DEVICE — SLV SCD KN/LEN ADJ EXPRSS BLENDED MD 1P/U

## (undated) DEVICE — GLV SURG SENSICARE PI ORTHO SZ8 LF STRL

## (undated) DEVICE — ELECTRD BLD EDGE COAT 3IN

## (undated) DEVICE — SUT PROLN 7/0 BV1 D/A 24IN 8702H

## (undated) DEVICE — SOL NS 500ML

## (undated) DEVICE — SUT SILK 3/0 TIES 18IN A184H

## (undated) DEVICE — SUT PROLN 6/0 C1 D/A 30IN 8706H